# Patient Record
Sex: MALE | Race: WHITE | NOT HISPANIC OR LATINO | Employment: FULL TIME | URBAN - METROPOLITAN AREA
[De-identification: names, ages, dates, MRNs, and addresses within clinical notes are randomized per-mention and may not be internally consistent; named-entity substitution may affect disease eponyms.]

---

## 2017-06-01 ENCOUNTER — TELEPHONE (OUTPATIENT)
Dept: FAMILY MEDICINE | Facility: CLINIC | Age: 47
End: 2017-06-01

## 2017-06-01 NOTE — TELEPHONE ENCOUNTER
Form given to Dr Maldonado to review.  10/25/16 last physical, are you willing to complete with out office visit?    Pilo FARNSWORTH, CMA

## 2017-06-01 NOTE — TELEPHONE ENCOUNTER
Forms received from: patient   Phone number listed: [phone]   Fax listed: na  Date received: June 1, 2017  Form description: Pre-Participation Physical  Once forms are completed, please return to TC via folder.  Is patient requesting to be contacted when forms are completed: yes  Phone: same as above  Form placed: given to MA to complete then give to Provider for signature  Heavenly JONES

## 2017-07-21 DIAGNOSIS — I10 ESSENTIAL HYPERTENSION WITH GOAL BLOOD PRESSURE LESS THAN 140/90: ICD-10-CM

## 2017-07-21 DIAGNOSIS — Z00.00 ROUTINE GENERAL MEDICAL EXAMINATION AT A HEALTH CARE FACILITY: ICD-10-CM

## 2017-07-21 RX ORDER — LISINOPRIL/HYDROCHLOROTHIAZIDE 10-12.5 MG
TABLET ORAL
Qty: 90 TABLET | Refills: 0 | Status: SHIPPED | OUTPATIENT
Start: 2017-07-21 | End: 2017-10-19

## 2017-10-19 DIAGNOSIS — I10 ESSENTIAL HYPERTENSION WITH GOAL BLOOD PRESSURE LESS THAN 140/90: ICD-10-CM

## 2017-10-19 DIAGNOSIS — Z00.00 ROUTINE GENERAL MEDICAL EXAMINATION AT A HEALTH CARE FACILITY: ICD-10-CM

## 2017-10-19 NOTE — TELEPHONE ENCOUNTER
Lisinopril      Last Written Prescription Date: 7/21/17  Last Fill Quantity: 90, # refills: 0  Last Office Visit with [facility] or [facility] prescribing provider: 10/25/16       Potassium   Date Value Ref Range Status   10/25/2016 3.9 3.4 - 5.3 mmol/L Final     Creatinine   Date Value Ref Range Status   10/25/2016 1.02 0.66 - 1.25 mg/dL Final     BP Readings from Last 3 Encounters:   10/25/16 108/62   08/07/15 118/70   04/07/15 124/84     JULIA Parker LPN

## 2017-10-20 RX ORDER — LISINOPRIL/HYDROCHLOROTHIAZIDE 10-12.5 MG
1 TABLET ORAL DAILY
Qty: 30 TABLET | Refills: 0 | Status: SHIPPED | OUTPATIENT
Start: 2017-10-20 | End: 2017-12-28

## 2017-11-22 DIAGNOSIS — I10 ESSENTIAL HYPERTENSION WITH GOAL BLOOD PRESSURE LESS THAN 140/90: ICD-10-CM

## 2017-11-22 DIAGNOSIS — Z00.00 ROUTINE GENERAL MEDICAL EXAMINATION AT A HEALTH CARE FACILITY: ICD-10-CM

## 2017-11-22 RX ORDER — LISINOPRIL/HYDROCHLOROTHIAZIDE 10-12.5 MG
TABLET ORAL
Qty: 30 TABLET | Refills: 0 | OUTPATIENT
Start: 2017-11-22

## 2017-11-22 NOTE — TELEPHONE ENCOUNTER
Rx denied, patient was given 30 day supply and did not f/u. Routing to team to inform and assist in scheduling.   Karlee Davidson RN   [facility] - Triage

## 2017-11-22 NOTE — TELEPHONE ENCOUNTER
Requested Prescriptions   Pending Prescriptions Disp Refills     lisinopril-hydrochlorothiazide (PRINZIDE/ZESTORETIC) 10-12.5 MG per tablet [Pharmacy Med Name: LISINOPRIL-HCTZ 10/12.5MG TABLETS]  Last Written Prescription Date:  10/20/2017  Last Fill Quantity: 30 tablet,  # refills: 0   Last Office Visit with [facility] or [facility] prescribing provider:  10/25/2016   Future Office Visit:      30 tablet 0     Sig: TAKE 1 TABLET BY MOUTH DAILY    Diuretics (Including Combos) Protocol Failed    11/22/2017  3:12 AM       Failed - Blood pressure under 140/90    BP Readings from Last 3 Encounters:   10/25/16 108/62   08/07/15 118/70   04/07/15 124/84                Failed - Recent or future visit with authorizing provider's specialty    Patient had office visit in the last year or has a visit in the next 30 days with authorizing provider.  See chart review.              Failed - Normal serum creatinine on file in past 12 months    Recent Labs   Lab Test  10/25/16   0756   CR  1.02             Failed - Normal serum potassium on file in past 12 months    Recent Labs   Lab Test  10/25/16   0756   POTASSIUM  3.9                   Failed - Normal serum sodium on file in past 12 months    Recent Labs   Lab Test  10/25/16   0756   NA  139             Passed - Patient is age 18 or older

## 2017-12-28 ENCOUNTER — OFFICE VISIT (OUTPATIENT)
Dept: FAMILY MEDICINE | Facility: CLINIC | Age: 47
End: 2017-12-28
Payer: COMMERCIAL

## 2017-12-28 VITALS
HEIGHT: 69 IN | WEIGHT: 244 LBS | BODY MASS INDEX: 36.14 KG/M2 | DIASTOLIC BLOOD PRESSURE: 88 MMHG | SYSTOLIC BLOOD PRESSURE: 118 MMHG | HEART RATE: 80 BPM | TEMPERATURE: 95.7 F

## 2017-12-28 DIAGNOSIS — Z00.00 ROUTINE GENERAL MEDICAL EXAMINATION AT A HEALTH CARE FACILITY: Primary | ICD-10-CM

## 2017-12-28 DIAGNOSIS — I10 ESSENTIAL HYPERTENSION WITH GOAL BLOOD PRESSURE LESS THAN 140/90: ICD-10-CM

## 2017-12-28 DIAGNOSIS — F33.40 RECURRENT MAJOR DEPRESSIVE DISORDER, IN REMISSION (H): ICD-10-CM

## 2017-12-28 DIAGNOSIS — Z13.6 CARDIOVASCULAR SCREENING; LDL GOAL LESS THAN 130: ICD-10-CM

## 2017-12-28 LAB
ALBUMIN SERPL-MCNC: 4.1 G/DL (ref 3.4–5)
ALP SERPL-CCNC: 88 U/L (ref 40–150)
ALT SERPL W P-5'-P-CCNC: 50 U/L (ref 0–70)
ANION GAP SERPL CALCULATED.3IONS-SCNC: 5 MMOL/L (ref 3–14)
AST SERPL W P-5'-P-CCNC: 27 U/L (ref 0–45)
BILIRUB SERPL-MCNC: 0.4 MG/DL (ref 0.2–1.3)
BUN SERPL-MCNC: 13 MG/DL (ref 7–30)
CALCIUM SERPL-MCNC: 9.1 MG/DL (ref 8.5–10.1)
CHLORIDE SERPL-SCNC: 104 MMOL/L (ref 94–109)
CHOLEST SERPL-MCNC: 196 MG/DL
CO2 SERPL-SCNC: 31 MMOL/L (ref 20–32)
CREAT SERPL-MCNC: 0.89 MG/DL (ref 0.66–1.25)
GFR SERPL CREATININE-BSD FRML MDRD: >90 ML/MIN/1.7M2
GLUCOSE SERPL-MCNC: 102 MG/DL (ref 70–99)
HDLC SERPL-MCNC: 51 MG/DL
LDLC SERPL CALC-MCNC: 105 MG/DL
NONHDLC SERPL-MCNC: 145 MG/DL
POTASSIUM SERPL-SCNC: 4.2 MMOL/L (ref 3.4–5.3)
PROT SERPL-MCNC: 7.6 G/DL (ref 6.8–8.8)
PSA SERPL-ACNC: 1.06 UG/L (ref 0–4)
SODIUM SERPL-SCNC: 140 MMOL/L (ref 133–144)
TRIGL SERPL-MCNC: 202 MG/DL

## 2017-12-28 PROCEDURE — 36415 COLL VENOUS BLD VENIPUNCTURE: CPT | Performed by: FAMILY MEDICINE

## 2017-12-28 PROCEDURE — 99396 PREV VISIT EST AGE 40-64: CPT | Performed by: FAMILY MEDICINE

## 2017-12-28 PROCEDURE — 80061 LIPID PANEL: CPT | Performed by: FAMILY MEDICINE

## 2017-12-28 PROCEDURE — G0103 PSA SCREENING: HCPCS | Performed by: FAMILY MEDICINE

## 2017-12-28 PROCEDURE — 80053 COMPREHEN METABOLIC PANEL: CPT | Performed by: FAMILY MEDICINE

## 2017-12-28 RX ORDER — LISINOPRIL/HYDROCHLOROTHIAZIDE 10-12.5 MG
1 TABLET ORAL DAILY
Qty: 90 TABLET | Refills: 3 | Status: SHIPPED | OUTPATIENT
Start: 2017-12-28 | End: 2018-12-21

## 2017-12-28 ASSESSMENT — ANXIETY QUESTIONNAIRES
GAD7 TOTAL SCORE: 0
1. FEELING NERVOUS, ANXIOUS, OR ON EDGE: NOT AT ALL
7. FEELING AFRAID AS IF SOMETHING AWFUL MIGHT HAPPEN: NOT AT ALL
6. BECOMING EASILY ANNOYED OR IRRITABLE: NOT AT ALL
IF YOU CHECKED OFF ANY PROBLEMS ON THIS QUESTIONNAIRE, HOW DIFFICULT HAVE THESE PROBLEMS MADE IT FOR YOU TO DO YOUR WORK, TAKE CARE OF THINGS AT HOME, OR GET ALONG WITH OTHER PEOPLE: NOT DIFFICULT AT ALL
3. WORRYING TOO MUCH ABOUT DIFFERENT THINGS: NOT AT ALL
2. NOT BEING ABLE TO STOP OR CONTROL WORRYING: NOT AT ALL
5. BEING SO RESTLESS THAT IT IS HARD TO SIT STILL: NOT AT ALL

## 2017-12-28 ASSESSMENT — PATIENT HEALTH QUESTIONNAIRE - PHQ9
5. POOR APPETITE OR OVEREATING: NOT AT ALL
SUM OF ALL RESPONSES TO PHQ QUESTIONS 1-9: 1

## 2017-12-28 NOTE — MR AVS SNAPSHOT
After Visit Summary   12/28/2017    Joselito Padilla    MRN: [id number]           Patient Information     Date Of Birth          1970        Visit Information        Provider Department      12/28/2017 8:20 AM Bhaskar Maldonado MD [facility]        Today's Diagnoses     Routine general medical examination at a health care facility    -  1    Essential hypertension with goal blood pressure less than 140/90        Recurrent major depressive disorder, in remission (H)        CARDIOVASCULAR SCREENING; LDL GOAL LESS THAN 130          Care Instructions      Preventive Health Recommendations  Male Ages 40 to 49    Yearly exam:             See your health care provider every year in order to  o   Review health changes.   o   Discuss preventive care.    o   Review your medicines if your doctor has prescribed any.    You should be tested each year for STDs (sexually transmitted diseases) if you re at risk.     Have a cholesterol test every 5 years.     Have a colonoscopy (test for colon cancer) if someone in your family has had colon cancer or polyps before age 50.     After age 45, have a diabetes test (fasting glucose). If you are at risk for diabetes, you should have this test every 3 years.      Talk with your health care provider about whether or not a prostate cancer screening test (PSA) is right for you.    Shots: Get a flu shot each year. Get a tetanus shot every 10 years.     Nutrition:    Eat at least 5 servings of fruits and vegetables daily.     Eat whole-grain bread, whole-wheat pasta and brown rice instead of white grains and rice.     Talk to your provider about Calcium and Vitamin D.     Lifestyle    Exercise for at least 150 minutes a week (30 minutes a day, 5 days a week). This will help you control your weight and prevent disease.     Limit alcohol to one drink per day.     No smoking.     Wear sunscreen to prevent skin cancer.     See your dentist every six months for an  "exam and cleaning.              Follow-ups after your visit        Who to contact     If you have questions or need follow up information about today's clinic visit or your schedule please contact [facility] [place] directly at [phone].  Normal or non-critical lab and imaging results will be communicated to you by BFKWhart, letter or phone within 4 business days after the clinic has received the results. If you do not hear from us within 7 days, please contact the clinic through BFKWhart or phone. If you have a critical or abnormal lab result, we will notify you by phone as soon as possible.  Submit refill requests through [employer] or call your pharmacy and they will forward the refill request to us. Please allow 3 business days for your refill to be completed.          Additional Information About Your Visit        BFKWhart Information     [employer] gives you secure access to your electronic health record. If you see a primary care provider, you can also send messages to your care team and make appointments. If you have questions, please call your primary care clinic.  If you do not have a primary care provider, please call [phone] and they will assist you.        Care EveryWhere ID     This is your Care EveryWhere ID. This could be used by other organizations to access your [place] medical records  [id number]        Your Vitals Were     Pulse Temperature Height BMI (Body Mass Index)          80 95.7  F (35.4  C) 5' 9.13\" (1.756 m) 35.89 kg/m2         Blood Pressure from Last 3 Encounters:   12/28/17 118/88   10/25/16 108/62   08/07/15 118/70    Weight from Last 3 Encounters:   12/28/17 244 lb (110.7 kg)   10/25/16 242 lb (109.8 kg)   08/07/15 241 lb (109.3 kg)              We Performed the Following     Comprehensive metabolic panel     Lipid Profile (Chol, Trig, HDL, LDL calc)     PSA, screen          Today's Medication Changes          These changes are accurate as of: 12/28/17  8:34 AM.  If " you have any questions, ask your nurse or doctor.               These medicines have changed or have updated prescriptions.        Dose/Directions    lisinopril-hydrochlorothiazide 10-12.5 MG per tablet   Commonly known as:  PRINZIDE/ZESTORETIC   This may have changed:  additional instructions   Used for:  Essential hypertension with goal blood pressure less than 140/90, Routine general medical examination at a health care facility   Changed by:  Bhaskar Maldonado MD        Dose:  1 tablet   Take 1 tablet by mouth daily   Quantity:  90 tablet   Refills:  3            Where to get your medicines      These medications were sent to [facility] [address] [place], [place] [address] [place] [address] AT [facility] [address]  [address]  [address] [address], [facility] [address]     Phone:  [phone]     lisinopril-hydrochlorothiazide 10-12.5 MG per tablet                Primary Care Provider Office Phone # Fax #    Bhaskar Maldonado [phone]       [address]  [place] [address]        Equal Access to Services     [facility] AH: Hadii aad ku hadasho Soomaali, waaxda luqadaha, qaybta kaalmada adeegyada, waxay idiin haynardan monique peterson . So [facility] [phone].    ATENCIÓN: Si habla español, tiene a johnson disposición servicios gratuitos de asistencia lingüística. Llame al [phone].    We comply with applicable federal civil rights laws and [place] laws. We do not discriminate on the basis of race, color, national origin, age, disability, sex, sexual orientation, or gender identity.            Thank you!     Thank you for choosing [facility] [place]  for your care. Our goal is always to provide you with excellent care. Hearing back from our patients is one way we can continue to improve our services. Please take a few minutes to complete the written survey that you may receive in the mail after your visit with us. Thank you!             Your Updated Medication List - Protect others  around you: Learn how to safely use, store and throw away your medicines at www.disposemymeds.org.          This list is accurate as of: 12/28/17  8:34 AM.  Always use your most recent med list.                   Brand Name Dispense Instructions for use Diagnosis    IBUPROFEN PO      Take 400-600 mg by mouth every 6 hours as needed for moderate pain        lisinopril-hydrochlorothiazide 10-12.5 MG per tablet    PRINZIDE/ZESTORETIC    90 tablet    Take 1 tablet by mouth daily    Essential hypertension with goal blood pressure less than 140/90, Routine general medical examination at a health care facility       MULTIVITAMIN ADULT PO           PRISTIQ 100 MG 24 hr tablet   Generic drug:  desvenlafaxine succinate      TK ONE T PO QAM        STRATTERA 80 MG capsule   Generic drug:  atomoxetine      Take 80 mg by mouth daily

## 2017-12-28 NOTE — NURSING NOTE
"Chief Complaint   Patient presents with     Physical     Fasting        Initial /88  Pulse 80  Temp 95.7  F (35.4  C)  Ht 5' 9.13\" (1.756 m)  Wt 244 lb (110.7 kg)  BMI 35.89 kg/m2 Estimated body mass index is 35.89 kg/(m^2) as calculated from the following:    Height as of this encounter: 5' 9.13\" (1.756 m).    Weight as of this encounter: 244 lb (110.7 kg).  Medication Reconciliation: complete    Current Outpatient Prescriptions   Medication Sig Dispense Refill     lisinopril-hydrochlorothiazide (PRINZIDE/ZESTORETIC) 10-12.5 MG per tablet Take 1 tablet by mouth daily . DUE FOR APPOINTMENT 30 tablet 0     PRISTIQ 100 MG TB24 24 hr tablet TK ONE T PO QAM  1     Multiple Vitamins-Minerals (MULTIVITAMIN ADULT PO)        atomoxetine (STRATTERA) 80 MG capsule Take 80 mg by mouth daily       IBUPROFEN PO Take 400-600 mg by mouth every 6 hours as needed for moderate pain         Pilo FARNSWORTH CMA  "

## 2017-12-28 NOTE — PROGRESS NOTES
SUBJECTIVE:   CC: Joselito Padilla is an 47 year old male who presents for preventative health visit.     Healthy Habits:    Do you get at least three servings of calcium containing foods daily (dairy, green leafy vegetables, etc.)? yes    Amount of exercise or daily activities, outside of work: 4 day(s) per week    Problems taking medications regularly No    Medication side effects: No    Have you had an eye exam in the past two years? yes    Do you see a dentist twice per year? yes    Do you have sleep apnea, excessive snoring or daytime drowsiness?yes-wears cpap         Patient has history of depression.  It is well controlled.  Sees psychiatrist currently on Pristiq and Strattera. Blood pressure is well controlled..      Today's PHQ-2 Score:   PHQ-2 ( 1999 Pfizer) 12/28/2017 10/24/2016   Q1: Little interest or pleasure in doing things 0 -   Q2: Feeling down, depressed or hopeless 0 -   PHQ-2 Score 0 -   Q1: Little interest or pleasure in doing things - Not at all   Q2: Feeling down, depressed or hopeless - Not at all   PHQ-2 Score - 0       Abuse: Current or Past(Physical, Sexual or Emotional)- No  Do you feel safe in your environment - Yes    Social History   Substance Use Topics     Smoking status: Never Smoker     Smokeless tobacco: Never Used     Alcohol use No      If you drink alcohol do you typically have >3 drinks per day or >7 drinks per week? No                      Last PSA: No results found for: PSA    Reviewed orders with patient. Reviewed health maintenance and updated orders accordingly - Yes      Reviewed and updated as needed this visit by clinical staff  Tobacco  Allergies  Meds  Fam Hx  Soc Hx        Reviewed and updated as needed this visit by Provider            ROS:  C: NEGATIVE for fever, chills, change in weight  I: NEGATIVE for worrisome rashes, moles or lesions  E: NEGATIVE for vision changes or irritation  ENT: NEGATIVE for ear, mouth and throat problems  R: NEGATIVE for  "significant cough or SOB  CV: NEGATIVE for chest pain, palpitations or peripheral edema  GI: NEGATIVE for nausea, abdominal pain, heartburn, or change in bowel habits   male: negative for dysuria, hematuria, decreased urinary stream, erectile dysfunction, urethral discharge  M: NEGATIVE for significant arthralgias or myalgia  N: NEGATIVE for weakness, dizziness or paresthesias  P: NEGATIVE for changes in mood or affect    OBJECTIVE:   /88  Pulse 80  Temp 95.7  F (35.4  C)  Ht 5' 9.13\" (1.756 m)  Wt 244 lb (110.7 kg)  BMI 35.89 kg/m2  EXAM:  GENERAL: healthy, alert and no distress  EYES: Eyes grossly normal to inspection, PERRL and conjunctivae and sclerae normal  HENT: ear canals and TM's normal, nose and mouth without ulcers or lesions  NECK: no adenopathy, no asymmetry, masses, or scars and thyroid normal to palpation  RESP: lungs clear to auscultation - no rales, rhonchi or wheezes  CV: regular rate and rhythm, normal S1 S2, no S3 or S4, no murmur, click or rub, no peripheral edema and peripheral pulses strong  ABDOMEN: soft, nontender, no hepatosplenomegaly, no masses and bowel sounds normal  MS: no gross musculoskeletal defects noted, no edema  SKIN: no suspicious lesions or rashes  NEURO: Normal strength and tone, mentation intact and speech normal  PSYCH: mentation appears normal, affect normal/bright    ASSESSMENT/PLAN:   1. Routine general medical examination at a health care facility    - lisinopril-hydrochlorothiazide (PRINZIDE/ZESTORETIC) 10-12.5 MG per tablet; Take 1 tablet by mouth daily  Dispense: 90 tablet; Refill: 3  - Comprehensive metabolic panel  - Lipid Profile (Chol, Trig, HDL, LDL calc)  - PSA, screen    2. Essential hypertension with goal blood pressure less than 140/90    - lisinopril-hydrochlorothiazide (PRINZIDE/ZESTORETIC) 10-12.5 MG per tablet; Take 1 tablet by mouth daily  Dispense: 90 tablet; Refill: 3  - Comprehensive metabolic panel    3. Recurrent major depressive " "disorder, in remission (H)      4. CARDIOVASCULAR SCREENING; LDL GOAL LESS THAN 130    - Comprehensive metabolic panel  - Lipid Profile (Chol, Trig, HDL, LDL calc)    COUNSELING:  Reviewed preventive health counseling, as reflected in patient instructions       Regular exercise       Healthy diet/nutrition       reports that he has never smoked. He has never used smokeless tobacco.      Estimated body mass index is 35.89 kg/(m^2) as calculated from the following:    Height as of this encounter: 5' 9.13\" (1.756 m).    Weight as of this encounter: 244 lb (110.7 kg).         Counseling Resources:  ATP IV Guidelines  Pooled Cohorts Equation Calculator  FRAX Risk Assessment  ICSI Preventive Guidelines  Dietary Guidelines for Americans, 2010  USDA's MyPlate  ASA Prophylaxis  Lung CA Screening    Bhaskar Maldonado MD  [facility]  "

## 2017-12-29 ASSESSMENT — ANXIETY QUESTIONNAIRES: GAD7 TOTAL SCORE: 0

## 2018-05-14 ENCOUNTER — TRANSFERRED RECORDS (OUTPATIENT)
Dept: HEALTH INFORMATION MANAGEMENT | Facility: CLINIC | Age: 48
End: 2018-05-14

## 2018-06-27 ENCOUNTER — TELEPHONE (OUTPATIENT)
Dept: FAMILY MEDICINE | Facility: CLINIC | Age: 48
End: 2018-06-27

## 2018-06-27 NOTE — TELEPHONE ENCOUNTER
Original form placed at  with label  Copy sent to stat abstracting  Patient informed  Heavenly JONES

## 2018-06-27 NOTE — TELEPHONE ENCOUNTER
Pre-Participation Physical form dropped off by patient  Placed in PCP bin for review/signature  Heavenly JONES

## 2018-12-13 ENCOUNTER — TRANSFERRED RECORDS (OUTPATIENT)
Dept: HEALTH INFORMATION MANAGEMENT | Facility: CLINIC | Age: 48
End: 2018-12-13

## 2018-12-21 DIAGNOSIS — I10 ESSENTIAL HYPERTENSION WITH GOAL BLOOD PRESSURE LESS THAN 140/90: ICD-10-CM

## 2018-12-21 DIAGNOSIS — Z00.00 ROUTINE GENERAL MEDICAL EXAMINATION AT A HEALTH CARE FACILITY: ICD-10-CM

## 2018-12-21 RX ORDER — LISINOPRIL/HYDROCHLOROTHIAZIDE 10-12.5 MG
1 TABLET ORAL DAILY
Qty: 30 TABLET | Refills: 0 | Status: SHIPPED | OUTPATIENT
Start: 2018-12-21 | End: 2018-12-27

## 2018-12-21 NOTE — TELEPHONE ENCOUNTER
Prescription approved per [facility] Refill Protocol.  For 30 days since pt has appt on 12/27 with Dr. Maldonado.    Nichole DUMONT RN  EP Triage

## 2018-12-21 NOTE — TELEPHONE ENCOUNTER
"Requested Prescriptions   Pending Prescriptions Disp Refills     lisinopril-hydrochlorothiazide (PRINZIDE/ZESTORETIC) 10-12.5 MG tablet [Pharmacy Med Name: LISINOPRIL-HCTZ 10/12.5MG TABLETS]  Last Written Prescription Date:  12/28/17  Last Fill Quantity: 90,  # refills: 3   Last office visit: 12/28/2017 with prescribing provider:  Erica   Future Office Visit:   Next 5 appointments (look out 90 days)    Dec 27, 2018  8:20 AM CST  [facility] with Bhaskar Maldonado MD  [facility] ([address] [address] [phone]          90 tablet 0     Sig: TAKE 1 TABLET BY MOUTH DAILY    Diuretics (Including Combos) Protocol Passed - 12/21/2018  3:12 AM       Passed - Blood pressure under 140/90 in past 12 months    BP Readings from Last 3 Encounters:   12/28/17 118/88   10/25/16 108/62   08/07/15 118/70                Passed - Recent (12 mo) or future (30 days) visit within the authorizing provider's specialty    Patient had office visit in the last 12 months or has a visit in the next 30 days with authorizing provider or within the authorizing provider's specialty.  See \"Patient Info\" tab in inbasket, or \"Choose Columns\" in Meds & Orders section of the refill encounter.             Passed - Patient is age 18 or older       Passed - Normal serum creatinine on file in past 12 months    Recent Labs   Lab Test 12/28/17  0836   CR 0.89             Passed - Normal serum potassium on file in past 12 months    Recent Labs   Lab Test 12/28/17  0836   POTASSIUM 4.2                   Passed - Normal serum sodium on file in past 12 months    Recent Labs   Lab Test 12/28/17  0836                   "

## 2018-12-27 ENCOUNTER — OFFICE VISIT (OUTPATIENT)
Dept: FAMILY MEDICINE | Facility: CLINIC | Age: 48
End: 2018-12-27
Payer: COMMERCIAL

## 2018-12-27 VITALS
HEIGHT: 69 IN | TEMPERATURE: 97.5 F | DIASTOLIC BLOOD PRESSURE: 80 MMHG | WEIGHT: 234 LBS | SYSTOLIC BLOOD PRESSURE: 104 MMHG | HEART RATE: 88 BPM | BODY MASS INDEX: 34.66 KG/M2

## 2018-12-27 DIAGNOSIS — Z13.6 CARDIOVASCULAR SCREENING; LDL GOAL LESS THAN 130: ICD-10-CM

## 2018-12-27 DIAGNOSIS — G47.30 SLEEP APNEA, UNSPECIFIED TYPE: ICD-10-CM

## 2018-12-27 DIAGNOSIS — I10 ESSENTIAL HYPERTENSION WITH GOAL BLOOD PRESSURE LESS THAN 140/90: ICD-10-CM

## 2018-12-27 DIAGNOSIS — Z00.00 ROUTINE GENERAL MEDICAL EXAMINATION AT A HEALTH CARE FACILITY: ICD-10-CM

## 2018-12-27 DIAGNOSIS — F33.40 RECURRENT MAJOR DEPRESSIVE DISORDER, IN REMISSION (H): Primary | ICD-10-CM

## 2018-12-27 LAB
ALBUMIN SERPL-MCNC: 4.1 G/DL (ref 3.4–5)
ALP SERPL-CCNC: 72 U/L (ref 40–150)
ALT SERPL W P-5'-P-CCNC: 35 U/L (ref 0–70)
ANION GAP SERPL CALCULATED.3IONS-SCNC: 6 MMOL/L (ref 3–14)
AST SERPL W P-5'-P-CCNC: 27 U/L (ref 0–45)
BILIRUB SERPL-MCNC: 0.3 MG/DL (ref 0.2–1.3)
BUN SERPL-MCNC: 14 MG/DL (ref 7–30)
CALCIUM SERPL-MCNC: 9.2 MG/DL (ref 8.5–10.1)
CHLORIDE SERPL-SCNC: 104 MMOL/L (ref 94–109)
CHOLEST SERPL-MCNC: 183 MG/DL
CO2 SERPL-SCNC: 28 MMOL/L (ref 20–32)
CREAT SERPL-MCNC: 1.02 MG/DL (ref 0.66–1.25)
GFR SERPL CREATININE-BSD FRML MDRD: 86 ML/MIN/{1.73_M2}
GLUCOSE SERPL-MCNC: 106 MG/DL (ref 70–99)
HDLC SERPL-MCNC: 47 MG/DL
LDLC SERPL CALC-MCNC: 98 MG/DL
NONHDLC SERPL-MCNC: 136 MG/DL
POTASSIUM SERPL-SCNC: 3.8 MMOL/L (ref 3.4–5.3)
PROT SERPL-MCNC: 7.6 G/DL (ref 6.8–8.8)
SODIUM SERPL-SCNC: 138 MMOL/L (ref 133–144)
TRIGL SERPL-MCNC: 190 MG/DL

## 2018-12-27 PROCEDURE — 80053 COMPREHEN METABOLIC PANEL: CPT | Performed by: FAMILY MEDICINE

## 2018-12-27 PROCEDURE — 99396 PREV VISIT EST AGE 40-64: CPT | Performed by: FAMILY MEDICINE

## 2018-12-27 PROCEDURE — 80061 LIPID PANEL: CPT | Performed by: FAMILY MEDICINE

## 2018-12-27 PROCEDURE — 36415 COLL VENOUS BLD VENIPUNCTURE: CPT | Performed by: FAMILY MEDICINE

## 2018-12-27 RX ORDER — ARIPIPRAZOLE 2 MG/1
2 TABLET ORAL DAILY
Refills: 1 | COMMUNITY
Start: 2018-10-07

## 2018-12-27 RX ORDER — LISINOPRIL/HYDROCHLOROTHIAZIDE 10-12.5 MG
1 TABLET ORAL DAILY
Qty: 90 TABLET | Refills: 3 | Status: SHIPPED | OUTPATIENT
Start: 2018-12-27 | End: 2019-12-31

## 2018-12-27 ASSESSMENT — ANXIETY QUESTIONNAIRES
6. BECOMING EASILY ANNOYED OR IRRITABLE: NOT AT ALL
2. NOT BEING ABLE TO STOP OR CONTROL WORRYING: NOT AT ALL
3. WORRYING TOO MUCH ABOUT DIFFERENT THINGS: NOT AT ALL
7. FEELING AFRAID AS IF SOMETHING AWFUL MIGHT HAPPEN: NOT AT ALL
5. BEING SO RESTLESS THAT IT IS HARD TO SIT STILL: NOT AT ALL
GAD7 TOTAL SCORE: 0
1. FEELING NERVOUS, ANXIOUS, OR ON EDGE: NOT AT ALL

## 2018-12-27 ASSESSMENT — MIFFLIN-ST. JEOR: SCORE: 1921.41

## 2018-12-27 ASSESSMENT — PATIENT HEALTH QUESTIONNAIRE - PHQ9
SUM OF ALL RESPONSES TO PHQ QUESTIONS 1-9: 3
5. POOR APPETITE OR OVEREATING: NOT AT ALL

## 2018-12-27 NOTE — PROGRESS NOTES
SUBJECTIVE:   CC: Joselito Padilla is an 48 year old male who presents for preventative health visit.     Physical   Annual:     Getting at least 3 servings of Calcium per day:  Yes    Bi-annual eye exam:  Yes    Dental care twice a year:  Yes    Sleep apnea or symptoms of sleep apnea:  Sleep apnea    Diet:  Regular (no restrictions)    Frequency of exercise:  4-5 days/week    Duration of exercise:  30-45 minutes    Taking medications regularly:  Yes    Medication side effects:  None    Additional concerns today:  No    PHQ-2 Total Score: 0        Depression symptoms in partial remission.  Sees [facility] currently on stable medications.    Sleep apnea issues on CPAP.  Blood pressure stable currently on stable dose of lisinopril/hydrochlorothiazide.  Denies any side effects.  Today's PHQ-2 Score:   PHQ-2 ( 1999 Pfizer) 12/26/2018   Q1: Little interest or pleasure in doing things 0   Q2: Feeling down, depressed or hopeless 0   PHQ-2 Score 0   Q1: Little interest or pleasure in doing things Not at all   Q2: Feeling down, depressed or hopeless Not at all   PHQ-2 Score 0       Abuse: Current or Past(Physical, Sexual or Emotional)- No  Do you feel safe in your environment? Yes    Social History     Tobacco Use     Smoking status: Never Smoker     Smokeless tobacco: Never Used   Substance Use Topics     Alcohol use: No     Alcohol/week: 0.0 oz     Alcohol Use 12/26/2018   If you drink alcohol do you typically have greater than 3 drinks per day OR greater than 7 drinks per week? Not Applicable       Last PSA:   PSA   Date Value Ref Range Status   12/28/2017 1.06 0 - 4 ug/L Final     Comment:     Assay Method:  Chemiluminescence using Siemens Vista analyzer       Reviewed orders with patient. Reviewed health maintenance and updated orders accordingly - Yes  BP Readings from Last 3 Encounters:   12/27/18 104/80   12/28/17 118/88   10/25/16 108/62    Wt Readings from Last 3 Encounters:   12/27/18 106.1 kg (234 lb)  "  12/28/17 110.7 kg (244 lb)   10/25/16 109.8 kg (242 lb)                  Patient Active Problem List   Diagnosis     Hypertension goal BP (blood pressure) < 140/90     Depression, major, recurrent (H)     CARDIOVASCULAR SCREENING; LDL GOAL LESS THAN 130     Sleep apnea     Francisco's deformity, right     Other postprocedural status(V45.89)     Past Surgical History:   Procedure Laterality Date     DENTAL SURGERY  1989    [place] teeth     VASECTOMY  2007       Social History     Tobacco Use     Smoking status: Never Smoker     Smokeless tobacco: Never Used   Substance Use Topics     Alcohol use: No     Alcohol/week: 0.0 oz     Family History   Problem Relation Age of Onset     Pulmonary Hypertension Mother      Hypertension Father      Heart Disease Father         bypass surgery     C.A.D. Father      Prostate Cancer Father            Reviewed and updated as needed this visit by clinical staff  Tobacco  Allergies  Meds  Fam Hx  Soc Hx        Reviewed and updated as needed this visit by Provider            Review of Systems  CONSTITUTIONAL: NEGATIVE for fever, chills, change in weight  INTEGUMENTARY/SKIN: NEGATIVE for worrisome rashes, moles or lesions  EYES: NEGATIVE for vision changes or irritation  ENT: NEGATIVE for ear, mouth and throat problems  RESP: NEGATIVE for significant cough or SOB  CV: NEGATIVE for chest pain, palpitations or peripheral edema  GI: NEGATIVE for nausea, abdominal pain, heartburn, or change in bowel habits   male: negative for dysuria, hematuria, decreased urinary stream, erectile dysfunction, urethral discharge  MUSCULOSKELETAL: NEGATIVE for significant arthralgias or myalgia  NEURO: NEGATIVE for weakness, dizziness or paresthesias  PSYCHIATRIC: NEGATIVE for changes in mood or affect    OBJECTIVE:   /80   Pulse 88   Temp 97.5  F (36.4  C) (Tympanic)   Ht 1.76 m (5' 9.29\")   Wt 106.1 kg (234 lb)   BMI 34.27 kg/m      Physical Exam  GENERAL: healthy, alert and no " "distress  EYES: Eyes grossly normal to inspection, PERRL and conjunctivae and sclerae normal  HENT: ear canals and TM's normal, nose and mouth without ulcers or lesions  NECK: no adenopathy, no asymmetry, masses, or scars and thyroid normal to palpation  RESP: lungs clear to auscultation - no rales, rhonchi or wheezes  CV: regular rate and rhythm, normal S1 S2, no S3 or S4, no murmur, click or rub, no peripheral edema and peripheral pulses strong  ABDOMEN: soft, nontender, no hepatosplenomegaly, no masses and bowel sounds normal  MS: no gross musculoskeletal defects noted, no edema  SKIN: no suspicious lesions or rashes  NEURO: Normal strength and tone, mentation intact and speech normal  PSYCH: mentation appears normal, affect normal/bright    Diagnostic Test Results:  none     ASSESSMENT/PLAN:   1. Routine general medical examination at a health care facility  Labs ordered.  Will follow up on those  - lisinopril-hydrochlorothiazide (PRINZIDE/ZESTORETIC) 10-12.5 MG tablet; Take 1 tablet by mouth daily  Dispense: 90 tablet; Refill: 3    2. Essential hypertension with goal blood pressure less than 140/90    - lisinopril-hydrochlorothiazide (PRINZIDE/ZESTORETIC) 10-12.5 MG tablet; Take 1 tablet by mouth daily  Dispense: 90 tablet; Refill: 3  - Comprehensive metabolic panel    3. Recurrent major depressive disorder, in remission (H)  Stable without any side effects.    4. Sleep apnea, unspecified type      5. CARDIOVASCULAR SCREENING; LDL GOAL LESS THAN 130    - Comprehensive metabolic panel  - Lipid panel reflex to direct LDL Fasting    COUNSELING:   Reviewed preventive health counseling, as reflected in patient instructions       Regular exercise       Healthy diet/nutrition    BP Readings from Last 1 Encounters:   12/27/18 104/80     Estimated body mass index is 34.27 kg/m  as calculated from the following:    Height as of this encounter: 1.76 m (5' 9.29\").    Weight as of this encounter: 106.1 kg (234 " lb).           reports that  has never smoked. he has never used smokeless tobacco.      Counseling Resources:  ATP IV Guidelines  Pooled Cohorts Equation Calculator  FRAX Risk Assessment  ICSI Preventive Guidelines  Dietary Guidelines for Americans, 2010  USDA's MyPlate  ASA Prophylaxis  Lung CA Screening    Bhaskar Maldonado MD  [facility]

## 2018-12-29 ASSESSMENT — ANXIETY QUESTIONNAIRES: GAD7 TOTAL SCORE: 0

## 2019-07-11 ENCOUNTER — TELEPHONE (OUTPATIENT)
Dept: FAMILY MEDICINE | Facility: CLINIC | Age: 49
End: 2019-07-11

## 2019-07-11 NOTE — TELEPHONE ENCOUNTER
Form placed in pcp in-basket.      .Stefanie SUN    [facility] [place]

## 2019-07-12 NOTE — TELEPHONE ENCOUNTER
Completed, faxed to medical records and called patient to advised form would be at the  for .      .Stefanie SUN    [facility] [place]

## 2019-07-15 NOTE — TELEPHONE ENCOUNTER
Form picked up by patient on 07.15.2019.    .Stefanie SUN    [facility] [place]

## 2019-12-31 ENCOUNTER — OFFICE VISIT (OUTPATIENT)
Dept: FAMILY MEDICINE | Facility: CLINIC | Age: 49
End: 2019-12-31
Payer: COMMERCIAL

## 2019-12-31 VITALS
SYSTOLIC BLOOD PRESSURE: 122 MMHG | WEIGHT: 237 LBS | DIASTOLIC BLOOD PRESSURE: 82 MMHG | OXYGEN SATURATION: 98 % | TEMPERATURE: 96.9 F | BODY MASS INDEX: 35.1 KG/M2 | HEART RATE: 88 BPM | HEIGHT: 69 IN

## 2019-12-31 DIAGNOSIS — Z13.6 CARDIOVASCULAR SCREENING; LDL GOAL LESS THAN 130: ICD-10-CM

## 2019-12-31 DIAGNOSIS — F33.40 RECURRENT MAJOR DEPRESSIVE DISORDER, IN REMISSION (H): Primary | ICD-10-CM

## 2019-12-31 DIAGNOSIS — I10 HYPERTENSION GOAL BP (BLOOD PRESSURE) < 140/90: ICD-10-CM

## 2019-12-31 DIAGNOSIS — Z00.00 ANNUAL PHYSICAL EXAM: ICD-10-CM

## 2019-12-31 DIAGNOSIS — Z12.11 COLON CANCER SCREENING: ICD-10-CM

## 2019-12-31 PROCEDURE — 80061 LIPID PANEL: CPT | Performed by: FAMILY MEDICINE

## 2019-12-31 PROCEDURE — G0103 PSA SCREENING: HCPCS | Performed by: FAMILY MEDICINE

## 2019-12-31 PROCEDURE — 36415 COLL VENOUS BLD VENIPUNCTURE: CPT | Performed by: FAMILY MEDICINE

## 2019-12-31 PROCEDURE — 99396 PREV VISIT EST AGE 40-64: CPT | Performed by: FAMILY MEDICINE

## 2019-12-31 PROCEDURE — 80053 COMPREHEN METABOLIC PANEL: CPT | Performed by: FAMILY MEDICINE

## 2019-12-31 RX ORDER — LISINOPRIL/HYDROCHLOROTHIAZIDE 10-12.5 MG
1 TABLET ORAL DAILY
Qty: 90 TABLET | Refills: 3 | Status: SHIPPED | OUTPATIENT
Start: 2019-12-31 | End: 2020-12-17

## 2019-12-31 ASSESSMENT — PATIENT HEALTH QUESTIONNAIRE - PHQ9
5. POOR APPETITE OR OVEREATING: SEVERAL DAYS
SUM OF ALL RESPONSES TO PHQ QUESTIONS 1-9: 1

## 2019-12-31 ASSESSMENT — ANXIETY QUESTIONNAIRES
GAD7 TOTAL SCORE: 1
5. BEING SO RESTLESS THAT IT IS HARD TO SIT STILL: NOT AT ALL
1. FEELING NERVOUS, ANXIOUS, OR ON EDGE: NOT AT ALL
3. WORRYING TOO MUCH ABOUT DIFFERENT THINGS: NOT AT ALL
2. NOT BEING ABLE TO STOP OR CONTROL WORRYING: NOT AT ALL
6. BECOMING EASILY ANNOYED OR IRRITABLE: NOT AT ALL
7. FEELING AFRAID AS IF SOMETHING AWFUL MIGHT HAPPEN: NOT AT ALL
IF YOU CHECKED OFF ANY PROBLEMS ON THIS QUESTIONNAIRE, HOW DIFFICULT HAVE THESE PROBLEMS MADE IT FOR YOU TO DO YOUR WORK, TAKE CARE OF THINGS AT HOME, OR GET ALONG WITH OTHER PEOPLE: NOT DIFFICULT AT ALL

## 2019-12-31 ASSESSMENT — MIFFLIN-ST. JEOR: SCORE: 1925.4

## 2019-12-31 NOTE — PROGRESS NOTES
SUBJECTIVE:   CC: Joselito Padilla is an 49 year old male who presents for preventative health visit.     Healthy Habits:     Getting at least 3 servings of Calcium per day:  Yes    Bi-annual eye exam:  Yes    Dental care twice a year:  Yes    Sleep apnea or symptoms of sleep apnea:  Sleep apnea    Diet:  Regular (no restrictions)    Frequency of exercise:  4-5 days/week    Duration of exercise:  30-45 minutes    Taking medications regularly:  Yes    Medication side effects:  None    PHQ-2 Total Score: 0    Additional concerns today:  No    Depression is well controled.      Today's PHQ-2 Score:   PHQ-2 ( 1999 Pfizer) 12/31/2019   Q1: Little interest or pleasure in doing things 0   Q2: Feeling down, depressed or hopeless 0   PHQ-2 Score 0   Q1: Little interest or pleasure in doing things Not at all   Q2: Feeling down, depressed or hopeless Not at all   PHQ-2 Score 0       Abuse: Current or Past(Physical, Sexual or Emotional)- No  Do you feel safe in your environment? Yes        Social History     Tobacco Use     Smoking status: Never Smoker     Smokeless tobacco: Never Used   Substance Use Topics     Alcohol use: No     Alcohol/week: 0.0 standard drinks       Alcohol Use 12/31/2019   Prescreen: >3 drinks/day or >7 drinks/week? Not Applicable   Prescreen: >3 drinks/day or >7 drinks/week? -     Last PSA:   PSA   Date Value Ref Range Status   12/28/2017 1.06 0 - 4 ug/L Final     Comment:     Assay Method:  Chemiluminescence using Siemens Vista analyzer       Reviewed orders with patient. Reviewed health maintenance and updated orders accordingly - Yes  Lab work is in process    Reviewed and updated as needed this visit by clinical staff  Tobacco  Allergies  Meds         Reviewed and updated as needed this visit by Provider            Review of Systems  CONSTITUTIONAL: NEGATIVE for fever, chills, change in weight  INTEGUMENTARY/SKIN: NEGATIVE for worrisome rashes, moles or lesions  EYES: NEGATIVE for vision changes  "or irritation  ENT: NEGATIVE for ear, mouth and throat problems  RESP: NEGATIVE for significant cough or SOB  CV: NEGATIVE for chest pain, palpitations or peripheral edema  GI: NEGATIVE for nausea, abdominal pain, heartburn, or change in bowel habits   male: negative for dysuria, hematuria, decreased urinary stream, erectile dysfunction, urethral discharge  MUSCULOSKELETAL: NEGATIVE for significant arthralgias or myalgia  NEURO: NEGATIVE for weakness, dizziness or paresthesias  PSYCHIATRIC: NEGATIVE for changes in mood or affect    OBJECTIVE:   /82   Pulse 88   Temp 96.9  F (36.1  C) (Tympanic)   Ht 5' 9\" (1.753 m)   Wt 237 lb (107.5 kg)   SpO2 98%   BMI 35.00 kg/m      Physical Exam  GENERAL: healthy, alert and no distress  EYES: Eyes grossly normal to inspection, PERRL and conjunctivae and sclerae normal  HENT: ear canals and TM's normal, nose and mouth without ulcers or lesions  NECK: no adenopathy, no asymmetry, masses, or scars and thyroid normal to palpation  RESP: lungs clear to auscultation - no rales, rhonchi or wheezes  CV: regular rate and rhythm, normal S1 S2, no S3 or S4, no murmur, click or rub, no peripheral edema and peripheral pulses strong  ABDOMEN: soft, nontender, no hepatosplenomegaly, no masses and bowel sounds normal  MS: no gross musculoskeletal defects noted, no edema  SKIN: no suspicious lesions or rashes  NEURO: Normal strength and tone, mentation intact and speech normal  PSYCH: mentation appears normal, affect normal/bright    Diagnostic Test Results:  Labs reviewed in Epic    ASSESSMENT/PLAN:   1. Annual physical exam    - Comprehensive metabolic panel  - Lipid panel reflex to direct LDL Fasting  - Prostate spec antigen screen    2. Recurrent major depressive disorder, in remission (H)  Well controled., stable on medications    3. CARDIOVASCULAR SCREENING; LDL GOAL LESS THAN 130    - Comprehensive metabolic panel  - Lipid panel reflex to direct LDL Fasting    4. " "Hypertension goal BP (blood pressure) < 140/90    - Lipid panel reflex to direct LDL Fasting  - lisinopril-hydrochlorothiazide (PRINZIDE/ZESTORETIC) 10-12.5 MG tablet; Take 1 tablet by mouth daily  Dispense: 90 tablet; Refill: 3    5. Colon cancer screening    - GASTROENTEROLOGY ADULT REF PROCEDURE ONLY    COUNSELING:   Reviewed preventive health counseling, as reflected in patient instructions       Regular exercise       Healthy diet/nutrition    Estimated body mass index is 35 kg/m  as calculated from the following:    Height as of this encounter: 5' 9\" (1.753 m).    Weight as of this encounter: 237 lb (107.5 kg).          reports that he has never smoked. He has never used smokeless tobacco.      Counseling Resources:  ATP IV Guidelines  Pooled Cohorts Equation Calculator  FRAX Risk Assessment  ICSI Preventive Guidelines  Dietary Guidelines for Americans, 2010  USDA's MyPlate  ASA Prophylaxis  Lung CA Screening    Bhaskar Maldonado MD  [facility]  "

## 2020-01-01 ASSESSMENT — ANXIETY QUESTIONNAIRES: GAD7 TOTAL SCORE: 1

## 2020-01-02 ENCOUNTER — HOSPITAL ENCOUNTER (OUTPATIENT)
Facility: CLINIC | Age: 50
End: 2020-01-02
Attending: COLON & RECTAL SURGERY | Admitting: COLON & RECTAL SURGERY
Payer: COMMERCIAL

## 2020-01-02 LAB
ALBUMIN SERPL-MCNC: 4.1 G/DL (ref 3.4–5)
ALP SERPL-CCNC: 73 U/L (ref 40–150)
ALT SERPL W P-5'-P-CCNC: 45 U/L (ref 0–70)
ANION GAP SERPL CALCULATED.3IONS-SCNC: 4 MMOL/L (ref 3–14)
AST SERPL W P-5'-P-CCNC: 26 U/L (ref 0–45)
BILIRUB SERPL-MCNC: 0.4 MG/DL (ref 0.2–1.3)
BUN SERPL-MCNC: 16 MG/DL (ref 7–30)
CALCIUM SERPL-MCNC: 9 MG/DL (ref 8.5–10.1)
CHLORIDE SERPL-SCNC: 104 MMOL/L (ref 94–109)
CHOLEST SERPL-MCNC: 203 MG/DL
CO2 SERPL-SCNC: 31 MMOL/L (ref 20–32)
CREAT SERPL-MCNC: 0.92 MG/DL (ref 0.66–1.25)
GFR SERPL CREATININE-BSD FRML MDRD: >90 ML/MIN/{1.73_M2}
GLUCOSE SERPL-MCNC: 91 MG/DL (ref 70–99)
HDLC SERPL-MCNC: 52 MG/DL
LDLC SERPL CALC-MCNC: 123 MG/DL
NONHDLC SERPL-MCNC: 151 MG/DL
POTASSIUM SERPL-SCNC: 4 MMOL/L (ref 3.4–5.3)
PROT SERPL-MCNC: 7.4 G/DL (ref 6.8–8.8)
PSA SERPL-ACNC: 1.27 UG/L (ref 0–4)
SODIUM SERPL-SCNC: 139 MMOL/L (ref 133–144)
TRIGL SERPL-MCNC: 140 MG/DL

## 2020-02-24 ENCOUNTER — TRANSFERRED RECORDS (OUTPATIENT)
Dept: HEALTH INFORMATION MANAGEMENT | Facility: CLINIC | Age: 50
End: 2020-02-24

## 2020-06-16 DIAGNOSIS — Z11.59 ENCOUNTER FOR SCREENING FOR OTHER VIRAL DISEASES: Primary | ICD-10-CM

## 2020-07-10 DIAGNOSIS — Z11.59 ENCOUNTER FOR SCREENING FOR OTHER VIRAL DISEASES: ICD-10-CM

## 2020-07-10 PROCEDURE — 99207 ZZC NO BILLABLE SERVICE THIS VISIT: CPT

## 2020-07-10 PROCEDURE — U0003 INFECTIOUS AGENT DETECTION BY NUCLEIC ACID (DNA OR RNA); SEVERE ACUTE RESPIRATORY SYNDROME CORONAVIRUS 2 (SARS-COV-2) (CORONAVIRUS DISEASE [COVID-19]), AMPLIFIED PROBE TECHNIQUE, MAKING USE OF HIGH THROUGHPUT TECHNOLOGIES AS DESCRIBED BY CMS-2020-01-R: HCPCS | Performed by: COLON & RECTAL SURGERY

## 2020-07-11 LAB
SARS-COV-2 RNA SPEC QL NAA+PROBE: NOT DETECTED
SPECIMEN SOURCE: NORMAL

## 2020-07-13 ENCOUNTER — HOSPITAL ENCOUNTER (OUTPATIENT)
Facility: CLINIC | Age: 50
Discharge: HOME | End: 2020-07-13
Attending: COLON & RECTAL SURGERY | Admitting: COLON & RECTAL SURGERY
Payer: COMMERCIAL

## 2020-07-13 VITALS
BODY MASS INDEX: 30.78 KG/M2 | RESPIRATION RATE: 11 BRPM | DIASTOLIC BLOOD PRESSURE: 88 MMHG | OXYGEN SATURATION: 98 % | HEIGHT: 70 IN | HEART RATE: 63 BPM | SYSTOLIC BLOOD PRESSURE: 123 MMHG | WEIGHT: 215 LBS

## 2020-07-13 LAB — COLONOSCOPY: NORMAL

## 2020-07-13 PROCEDURE — 88305 TISSUE EXAM BY PATHOLOGIST: CPT | Mod: 26,59 | Performed by: PATHOLOGY

## 2020-07-13 PROCEDURE — G0500 MOD SEDAT ENDO SERVICE >5YRS: HCPCS | Performed by: COLON & RECTAL SURGERY

## 2020-07-13 PROCEDURE — 88305 TISSUE EXAM BY PATHOLOGIST: CPT | Performed by: COLON & RECTAL SURGERY

## 2020-07-13 PROCEDURE — 88305 TISSUE EXAM BY PATHOLOGIST: CPT | Mod: 26,59 | Performed by: COLON & RECTAL SURGERY

## 2020-07-13 PROCEDURE — 25000128 H RX IP 250 OP 636: Performed by: COLON & RECTAL SURGERY

## 2020-07-13 PROCEDURE — 99153 MOD SED SAME PHYS/QHP EA: CPT

## 2020-07-13 PROCEDURE — 45385 COLONOSCOPY W/LESION REMOVAL: CPT | Performed by: COLON & RECTAL SURGERY

## 2020-07-13 RX ORDER — LIDOCAINE 40 MG/G
CREAM TOPICAL
Status: DISCONTINUED | OUTPATIENT
Start: 2020-07-13 | End: 2020-07-13 | Stop reason: HOSPADM

## 2020-07-13 RX ORDER — FENTANYL CITRATE 50 UG/ML
INJECTION, SOLUTION INTRAMUSCULAR; INTRAVENOUS PRN
Status: DISCONTINUED | OUTPATIENT
Start: 2020-07-13 | End: 2020-07-13 | Stop reason: HOSPADM

## 2020-07-13 RX ORDER — ONDANSETRON 2 MG/ML
4 INJECTION INTRAMUSCULAR; INTRAVENOUS
Status: DISCONTINUED | OUTPATIENT
Start: 2020-07-13 | End: 2020-07-13 | Stop reason: HOSPADM

## 2020-07-13 ASSESSMENT — MIFFLIN-ST. JEOR: SCORE: 1833.54

## 2020-07-13 NOTE — H&P
Colon & Rectal Surgery History and Physical  Pre-Endoscopy Procedure Note    History of Present Illness   I have been asked by Dr. Maldonado to evaluate this 49 year old male for colorectal cancer screening. He currently denies any abdominal pain, weight loss, bleeding per rectum, or recent change in bowel habits.    Past Medical History  Diagnosis Date     Achilles tendon pain 11/25/2013     ADHD      CARDIOVASCULAR SCREENING; LDL GOAL LESS THAN 130 11/12/2014     Depressive disorder      Hypertension goal BP (blood pressure) < 140/90 11/25/2013     Sleep apnea        Past Surgical History  Procedure Laterality Date     DENTAL SURGERY  1989    [place] teeth     Heel surgery      bilateral     VASECTOMY  2007        Medications  Medication Sig     ARIPiprazole (ABILIFY) 2 MG tablet TK 1 T PO  QAM     atomoxetine (STRATTERA) 80 MG capsule Take 80 mg by mouth daily     IBUPROFEN PO Take 400-600 mg by mouth every 6 hours as needed for moderate pain     lisinopril-hydrochlorothiazide (PRINZIDE/ZESTORETIC) 10-12.5 MG tablet Take 1 tablet by mouth daily     Multiple Vitamins-Minerals (MULTIVITAMIN ADULT PO)      PRISTIQ 100 MG TB24 24 hr tablet TK ONE T PO QAM       Allergies   No Known Allergies     Family History   Family history includes C.A.D. in his father; Heart Disease in his father; Hypertension in his father; Prostate Cancer in his father; Pulmonary Hypertension in his mother.     Social History    He reports that he has never smoked. He has never used smokeless tobacco. He reports that he does not drink alcohol or use drugs.    Review of Systems   Constitutional:  No fever, weight change or fatigue.    Eyes:     No dry eyes or vision changes.   Ears/Nose/Throat/Neck:  No oral ulcers, sore throat or voice change.    Cardiovascular:   No palpitations, syncope, angina or edema.   Respiratory:    No chest pain, excessive sleepiness, shortness of breath or hemoptysis.    Gastrointestinal:   No abdominal pain, nausea,  "vomiting, diarrhea or heartburn.    Genitourinary:   No dysuria, hematuria, urinary retention or urinary frequency.   Musculoskeletal:  No joint swelling or arthralgias.    Dermatologic:  No skin rash or other skin changes.   Neurologic:    No focal weakness or numbness. No neuropathy.   Psychiatric:    No depression, anxiety, suicidal ideation, or paranoid ideation.   Endocrine:   No cold or heat intolerance, polydipsia, hirsutism, change in libido, or flushing.   Hematology/Lymphatic:  No bleeding or lymphadenopathy.    Allergy/Immunology:  No rhinitis or hives.     Physical Exam   Vitals:  Blood pressure (!) 127/92, resp. rate 16, height 1.765 m (5' 9.5\"), weight 97.5 kg (215 lb), SpO2 100 %.    General:  Alert and oriented to person, place and time   Airway: Normal oropharyngeal airway and neck mobility   Lungs:  Clear bilaterally   Heart:  Regular rate and rhythm   Abdomen: Soft, NT, ND, no masses   Rectal:  Perianal skin without excoriation, hemorrhoidal disease or anal fissure        Digital rectal examination reveals normal sphincter tone without masses    ASA Grade: II (mild systemic disease)    Impression: Cleared for use of conscious sedation for colorectal cancer screening    Plan: Proceed with colonoscopy     Татьяна Mercer MD, MD  [facility]  [phone]  "

## 2020-07-14 LAB — COPATH REPORT: NORMAL

## 2020-08-28 ENCOUNTER — MYC REFILL (OUTPATIENT)
Dept: FAMILY MEDICINE | Facility: CLINIC | Age: 50
End: 2020-08-28

## 2020-08-28 DIAGNOSIS — I10 HYPERTENSION GOAL BP (BLOOD PRESSURE) < 140/90: ICD-10-CM

## 2020-08-28 RX ORDER — LISINOPRIL/HYDROCHLOROTHIAZIDE 10-12.5 MG
1 TABLET ORAL DAILY
Qty: 90 TABLET | Refills: 3 | Status: CANCELLED | OUTPATIENT
Start: 2020-08-28

## 2020-08-28 RX ORDER — LISINOPRIL 10 MG/1
10 TABLET ORAL DAILY
Qty: 90 TABLET | Refills: 0 | Status: SHIPPED | OUTPATIENT
Start: 2020-08-28 | End: 2020-11-23

## 2020-08-28 RX ORDER — HYDROCHLOROTHIAZIDE 12.5 MG/1
12.5 TABLET ORAL DAILY
Qty: 90 TABLET | Refills: 0 | Status: SHIPPED | OUTPATIENT
Start: 2020-08-28 | End: 2020-11-23

## 2020-08-28 NOTE — TELEPHONE ENCOUNTER
rxs for lisinopril 10 mg 1 tab daily and hydrochlorothiazide 12.5 mg 1 tab daily sent separately to pharmacy.    Nichole DUMONT RN  EP Triage

## 2020-11-23 DIAGNOSIS — I10 HYPERTENSION GOAL BP (BLOOD PRESSURE) < 140/90: ICD-10-CM

## 2020-11-23 RX ORDER — HYDROCHLOROTHIAZIDE 12.5 MG/1
TABLET ORAL
Qty: 90 TABLET | Refills: 0 | Status: SHIPPED | OUTPATIENT
Start: 2020-11-23 | End: 2020-12-17

## 2020-11-23 RX ORDER — LISINOPRIL 10 MG/1
TABLET ORAL
Qty: 90 TABLET | Refills: 0 | Status: SHIPPED | OUTPATIENT
Start: 2020-11-23 | End: 2020-12-17

## 2020-11-23 NOTE — TELEPHONE ENCOUNTER
Medication is being filled for 1 time refill only due to:  Patient needs to be seen because it will be a year since seen in December.     Nichole DUMONT RN  EP Triage

## 2020-11-24 NOTE — TELEPHONE ENCOUNTER
Left message on voicemail for patient to call back. Pilo FARNSWORTH CMA  Pt should schedule physical on or after 12/31.

## 2020-12-13 ENCOUNTER — HEALTH MAINTENANCE LETTER (OUTPATIENT)
Age: 50
End: 2020-12-13

## 2020-12-17 ENCOUNTER — OFFICE VISIT (OUTPATIENT)
Dept: FAMILY MEDICINE | Facility: CLINIC | Age: 50
End: 2020-12-17
Payer: COMMERCIAL

## 2020-12-17 VITALS
WEIGHT: 221 LBS | HEART RATE: 72 BPM | OXYGEN SATURATION: 98 % | DIASTOLIC BLOOD PRESSURE: 82 MMHG | HEIGHT: 70 IN | SYSTOLIC BLOOD PRESSURE: 124 MMHG | TEMPERATURE: 97.6 F | BODY MASS INDEX: 31.64 KG/M2

## 2020-12-17 DIAGNOSIS — F33.40 RECURRENT MAJOR DEPRESSIVE DISORDER, IN REMISSION (H): Primary | ICD-10-CM

## 2020-12-17 DIAGNOSIS — Z00.00 ENCOUNTER FOR ANNUAL PHYSICAL EXAM: ICD-10-CM

## 2020-12-17 DIAGNOSIS — Z23 NEED FOR VACCINATION: ICD-10-CM

## 2020-12-17 DIAGNOSIS — Z12.5 SCREENING FOR PROSTATE CANCER: ICD-10-CM

## 2020-12-17 DIAGNOSIS — I10 HYPERTENSION GOAL BP (BLOOD PRESSURE) < 140/90: ICD-10-CM

## 2020-12-17 DIAGNOSIS — Z11.4 SCREENING FOR HIV (HUMAN IMMUNODEFICIENCY VIRUS): ICD-10-CM

## 2020-12-17 DIAGNOSIS — Z11.59 NEED FOR HEPATITIS C SCREENING TEST: ICD-10-CM

## 2020-12-17 DIAGNOSIS — Z13.6 CARDIOVASCULAR SCREENING; LDL GOAL LESS THAN 160: ICD-10-CM

## 2020-12-17 LAB
ALBUMIN SERPL-MCNC: 4.2 G/DL (ref 3.4–5)
ALP SERPL-CCNC: 86 U/L (ref 40–150)
ALT SERPL W P-5'-P-CCNC: 60 U/L (ref 0–70)
ANION GAP SERPL CALCULATED.3IONS-SCNC: 3 MMOL/L (ref 3–14)
AST SERPL W P-5'-P-CCNC: 33 U/L (ref 0–45)
BILIRUB SERPL-MCNC: 0.4 MG/DL (ref 0.2–1.3)
BUN SERPL-MCNC: 17 MG/DL (ref 7–30)
CALCIUM SERPL-MCNC: 9.3 MG/DL (ref 8.5–10.1)
CHLORIDE SERPL-SCNC: 106 MMOL/L (ref 94–109)
CHOLEST SERPL-MCNC: 213 MG/DL
CO2 SERPL-SCNC: 29 MMOL/L (ref 20–32)
CREAT SERPL-MCNC: 0.92 MG/DL (ref 0.66–1.25)
GFR SERPL CREATININE-BSD FRML MDRD: >90 ML/MIN/{1.73_M2}
GLUCOSE SERPL-MCNC: 98 MG/DL (ref 70–99)
HCV AB SERPL QL IA: NONREACTIVE
HDLC SERPL-MCNC: 53 MG/DL
HIV 1+2 AB+HIV1 P24 AG SERPL QL IA: NONREACTIVE
LDLC SERPL CALC-MCNC: 141 MG/DL
NONHDLC SERPL-MCNC: 160 MG/DL
POTASSIUM SERPL-SCNC: 3.7 MMOL/L (ref 3.4–5.3)
PROT SERPL-MCNC: 8.1 G/DL (ref 6.8–8.8)
PSA SERPL-ACNC: 1.69 UG/L (ref 0–4)
SODIUM SERPL-SCNC: 138 MMOL/L (ref 133–144)
TRIGL SERPL-MCNC: 97 MG/DL

## 2020-12-17 PROCEDURE — G0103 PSA SCREENING: HCPCS | Performed by: FAMILY MEDICINE

## 2020-12-17 PROCEDURE — 99396 PREV VISIT EST AGE 40-64: CPT | Mod: 25 | Performed by: FAMILY MEDICINE

## 2020-12-17 PROCEDURE — 90471 IMMUNIZATION ADMIN: CPT | Performed by: FAMILY MEDICINE

## 2020-12-17 PROCEDURE — 87389 HIV-1 AG W/HIV-1&-2 AB AG IA: CPT | Performed by: FAMILY MEDICINE

## 2020-12-17 PROCEDURE — 36415 COLL VENOUS BLD VENIPUNCTURE: CPT | Performed by: FAMILY MEDICINE

## 2020-12-17 PROCEDURE — 90750 HZV VACC RECOMBINANT IM: CPT | Performed by: FAMILY MEDICINE

## 2020-12-17 PROCEDURE — 99213 OFFICE O/P EST LOW 20 MIN: CPT | Mod: 25 | Performed by: FAMILY MEDICINE

## 2020-12-17 PROCEDURE — 80053 COMPREHEN METABOLIC PANEL: CPT | Performed by: FAMILY MEDICINE

## 2020-12-17 PROCEDURE — 80061 LIPID PANEL: CPT | Performed by: FAMILY MEDICINE

## 2020-12-17 PROCEDURE — 86803 HEPATITIS C AB TEST: CPT | Performed by: FAMILY MEDICINE

## 2020-12-17 RX ORDER — LISINOPRIL 10 MG/1
10 TABLET ORAL DAILY
Qty: 90 TABLET | Refills: 3 | Status: SHIPPED | OUTPATIENT
Start: 2020-12-17 | End: 2022-01-06

## 2020-12-17 RX ORDER — HYDROCHLOROTHIAZIDE 12.5 MG/1
TABLET ORAL
Qty: 90 TABLET | Refills: 3 | Status: SHIPPED | OUTPATIENT
Start: 2020-12-17 | End: 2022-01-06

## 2020-12-17 ASSESSMENT — MIFFLIN-ST. JEOR: SCORE: 1855.76

## 2020-12-17 NOTE — LETTER
December 18, 2020      Joselito Padilla  [address] [address] [address]        Dear ,    We are writing to inform you of your test results.    -PSA (prostate specific antigen) test is normal.  This indicates a low likelihood of prostate cancer.  ADVISE: rechecking this in 1 year.   -LDL(bad) cholesterol level is elevated which can increase your heart disease risk.  A diet high in fat and simple carbohydrates, genetics and being overweight can contribute to this. ADVISE: exercising 150 minutes of aerobic exercise per week (30 minutes for 5 days per week or 50 minutes for 3 days per week are options) and eating a low saturated fat/low carbohydrate diet are helpful to improve this. In 6 months, you should recheck your fasting cholesterol panel by scheduling a lab-only appointment.   At this point we will continue to monitor.       -Liver and gallbladder tests are normal (ALT,AST, Alk phos, bilirubin), kidney function is normal (Cr, GFR), sodium is normal, potassium is normal, calcium is normal, glucose is normal.   -Hepatitis C antibody screen test shows no signs of a previous hepatitis C infection.   -HIV test is normal.     Resulted Orders   HIV Antigen Antibody Combo   Result Value Ref Range    HIV Antigen Antibody Combo Nonreactive NR^Nonreactive          Comment:      HIV-1 p24 Ag & HIV-1/HIV-2 Ab Not Detected   Hepatitis C Screen Reflex to HCV RNA Quant and Genotype   Result Value Ref Range    Hepatitis C Antibody Nonreactive NR^Nonreactive      Comment:      Assay performance characteristics have not been established for newborns,   infants, and children     Comprehensive metabolic panel   Result Value Ref Range    Sodium 138 133 - 144 mmol/L    Potassium 3.7 3.4 - 5.3 mmol/L    Chloride 106 94 - 109 mmol/L    Carbon Dioxide 29 20 - 32 mmol/L    Anion Gap 3 3 - 14 mmol/L    Glucose 98 70 - 99 mg/dL      Comment:      Fasting specimen    Urea Nitrogen 17 7 - 30 mg/dL    Creatinine  0.92 0.66 - 1.25 mg/dL    GFR Estimate >90 >60 mL/min/[1.73_m2]      Comment:      Non  GFR Calc  Starting 12/18/2018, serum creatinine based estimated GFR (eGFR) will be   calculated using the Chronic Kidney Disease Epidemiology Collaboration   (CKD-EPI) equation.      GFR Estimate If Black >90 >60 mL/min/[1.73_m2]      Comment:       GFR Calc  Starting 12/18/2018, serum creatinine based estimated GFR (eGFR) will be   calculated using the Chronic Kidney Disease Epidemiology Collaboration   (CKD-EPI) equation.      Calcium 9.3 8.5 - 10.1 mg/dL    Bilirubin Total 0.4 0.2 - 1.3 mg/dL    Albumin 4.2 3.4 - 5.0 g/dL    Protein Total 8.1 6.8 - 8.8 g/dL    Alkaline Phosphatase 86 40 - 150 U/L    ALT 60 0 - 70 U/L    AST 33 0 - 45 U/L   Lipid panel reflex to direct LDL Fasting   Result Value Ref Range    Cholesterol 213 (H) <200 mg/dL      Comment:      Desirable:       <200 mg/dl    Triglycerides 97 <150 mg/dL      Comment:      Fasting specimen    HDL Cholesterol 53 >39 mg/dL    LDL Cholesterol Calculated 141 (H) <100 mg/dL      Comment:      Above desirable:  100-129 mg/dl  Borderline High:  130-159 mg/dL  High:             160-189 mg/dL  Very high:       >189 mg/dl      Non HDL Cholesterol 160 (H) <130 mg/dL      Comment:      Above Desirable:  130-159 mg/dl  Borderline high:  160-189 mg/dl  High:             190-219 mg/dl  Very high:       >219 mg/dl     Prostate spec antigen screen   Result Value Ref Range    PSA 1.69 0 - 4 ug/L      Comment:      Assay Method:  Chemiluminescence using Siemens Vista analyzer       If you have any questions or concerns, please call the clinic at the number listed above.       Sincerely,      Bhaskar Maldonado MD

## 2020-12-17 NOTE — PROGRESS NOTES
SUBJECTIVE:   CC: Joselito Padilla is an 50 year old male who presents for preventative health visit.     Patient has been advised of split billing requirements and indicates understanding: Yes  Healthy Habits:     Getting at least 3 servings of Calcium per day:  Yes    Bi-annual eye exam:  Yes    Dental care twice a year:  Yes    Sleep apnea or symptoms of sleep apnea:  Sleep apnea    Diet:  Regular (no restrictions)    Frequency of exercise:  4-5 days/week    Duration of exercise:  30-45 minutes    Taking medications regularly:  Yes    Medication side effects:  None, No muscle aches and No significant flushing    PHQ-2 Total Score: 0    Additional concerns today:  No        BP is stable. Depression symptoms  Stable.    Today's PHQ-2 Score:   PHQ-2 ( 1999 Pfizer) 12/14/2020   Q1: Little interest or pleasure in doing things 0   Q2: Feeling down, depressed or hopeless 0   PHQ-2 Score 0   Q1: Little interest or pleasure in doing things Not at all   Q2: Feeling down, depressed or hopeless Not at all   PHQ-2 Score 0       Abuse: Current or Past(Physical, Sexual or Emotional)- No  Do you feel safe in your environment? Yes        Social History     Tobacco Use     Smoking status: Never Smoker     Smokeless tobacco: Never Used   Substance Use Topics     Alcohol use: No     Alcohol/week: 0.0 standard drinks     If you drink alcohol do you typically have >3 drinks per day or >7 drinks per week? No    Alcohol Use 12/14/2020   Prescreen: >3 drinks/day or >7 drinks/week? Not Applicable   Prescreen: >3 drinks/day or >7 drinks/week? -   No flowsheet data found.    Last PSA:   PSA   Date Value Ref Range Status   12/31/2019 1.27 0 - 4 ug/L Final     Comment:     Assay Method:  Chemiluminescence using Siemens Vista analyzer       Reviewed orders with patient. Reviewed health maintenance and updated orders accordingly - Yes      Reviewed and updated as needed this visit by clinical staff                 Reviewed and updated  as needed this visit by Provider                    Review of Systems  CONSTITUTIONAL: NEGATIVE for fever, chills, change in weight  INTEGUMENTARY/SKIN: NEGATIVE for worrisome rashes, moles or lesions  EYES: NEGATIVE for vision changes or irritation  ENT: NEGATIVE for ear, mouth and throat problems  RESP: NEGATIVE for significant cough or SOB  CV: NEGATIVE for chest pain, palpitations or peripheral edema  GI: NEGATIVE for nausea, abdominal pain, heartburn, or change in bowel habits   male: negative for dysuria, hematuria, decreased urinary stream, erectile dysfunction, urethral discharge  MUSCULOSKELETAL: NEGATIVE for significant arthralgias or myalgia  NEURO: NEGATIVE for weakness, dizziness or paresthesias  PSYCHIATRIC: NEGATIVE for changes in mood or affect    OBJECTIVE:   There were no vitals taken for this visit.    Physical Exam  GENERAL: healthy, alert and no distress  EYES: Eyes grossly normal to inspection, PERRL and conjunctivae and sclerae normal  HENT: ear canals and TM's normal, nose and mouth without ulcers or lesions  NECK: no adenopathy, no asymmetry, masses, or scars and thyroid normal to palpation  RESP: lungs clear to auscultation - no rales, rhonchi or wheezes  CV: regular rate and rhythm, normal S1 S2, no S3 or S4, no murmur, click or rub, no peripheral edema and peripheral pulses strong  ABDOMEN: soft, nontender, no hepatosplenomegaly, no masses and bowel sounds normal  MS: no gross musculoskeletal defects noted, no edema  SKIN: no suspicious lesions or rashes  NEURO: Normal strength and tone, mentation intact and speech normal  PSYCH: mentation appears normal, affect normal/bright    Diagnostic Test Results:  Labs reviewed in Epic    ASSESSMENT/PLAN:   1. Encounter for annual physical exam  Labs ordered will follow up on.    2. Screening for HIV (human immunodeficiency virus)    - HIV Antigen Antibody Combo    3. Need for hepatitis C screening test    - Hepatitis C Screen Reflex to HCV RNA  "Quant and Genotype    4. Recurrent major depressive disorder, in remission (H)      5. Hypertension goal BP (blood pressure) < 140/90    - Comprehensive metabolic panel  - hydrochlorothiazide (HYDRODIURIL) 12.5 MG tablet; TAKE 1 TABLET(12.5 MG) BY MOUTH DAILY  Dispense: 90 tablet; Refill: 3  - lisinopril (ZESTRIL) 10 MG tablet; Take 1 tablet (10 mg) by mouth daily  Dispense: 90 tablet; Refill: 3    6. CARDIOVASCULAR SCREENING; LDL GOAL LESS THAN 160    - Comprehensive metabolic panel  - Lipid panel reflex to direct LDL Fasting    7. Screening for prostate cancer    - Prostate spec antigen screen    8. Need for vaccination    - SHINGRIX [[id number]]    Patient has been advised of split billing requirements and indicates understanding: Yes  COUNSELING:   Reviewed preventive health counseling, as reflected in patient instructions       Regular exercise       Healthy diet/nutrition    Estimated body mass index is 31.29 kg/m  as calculated from the following:    Height as of 7/13/20: 1.765 m (5' 9.5\").    Weight as of 7/13/20: 97.5 kg (215 lb).         He reports that he has never smoked. He has never used smokeless tobacco.      Counseling Resources:  ATP IV Guidelines  Pooled Cohorts Equation Calculator  FRAX Risk Assessment  ICSI Preventive Guidelines  Dietary Guidelines for Americans, 2010  USDA's MyPlate  ASA Prophylaxis  Lung CA Screening    Bhaskar Maldonado MD  [facility]  "

## 2021-02-21 DIAGNOSIS — I10 HYPERTENSION GOAL BP (BLOOD PRESSURE) < 140/90: ICD-10-CM

## 2021-02-22 RX ORDER — LISINOPRIL 10 MG/1
TABLET ORAL
Qty: 90 TABLET | Refills: 3 | OUTPATIENT
Start: 2021-02-22

## 2021-02-22 RX ORDER — HYDROCHLOROTHIAZIDE 12.5 MG/1
TABLET ORAL
Qty: 90 TABLET | Refills: 3 | OUTPATIENT
Start: 2021-02-22

## 2021-02-24 ENCOUNTER — ALLIED HEALTH/NURSE VISIT (OUTPATIENT)
Dept: NURSING | Facility: CLINIC | Age: 51
End: 2021-02-24
Payer: COMMERCIAL

## 2021-02-24 DIAGNOSIS — Z23 NEED FOR VACCINATION: Primary | ICD-10-CM

## 2021-02-24 PROCEDURE — 90471 IMMUNIZATION ADMIN: CPT

## 2021-02-24 PROCEDURE — 90750 HZV VACC RECOMBINANT IM: CPT

## 2021-02-24 PROCEDURE — 99207 PR NO CHARGE NURSE ONLY: CPT

## 2021-03-24 ENCOUNTER — IMMUNIZATION (OUTPATIENT)
Dept: PEDIATRICS | Facility: CLINIC | Age: 51
End: 2021-03-24
Payer: COMMERCIAL

## 2021-03-24 PROCEDURE — 91300 PR COVID VAC PFIZER DIL RECON 30 MCG/0.3 ML IM: CPT

## 2021-03-24 PROCEDURE — 0001A PR COVID VAC PFIZER DIL RECON 30 MCG/0.3 ML IM: CPT

## 2021-04-14 ENCOUNTER — OFFICE VISIT (OUTPATIENT)
Dept: PEDIATRICS | Facility: CLINIC | Age: 51
End: 2021-04-14
Attending: INTERNAL MEDICINE
Payer: COMMERCIAL

## 2021-04-14 PROCEDURE — 91300 PR COVID VAC PFIZER DIL RECON 30 MCG/0.3 ML IM: CPT

## 2021-04-14 PROCEDURE — 0002A PR COVID VAC PFIZER DIL RECON 30 MCG/0.3 ML IM: CPT

## 2021-06-01 ENCOUNTER — TRANSFERRED RECORDS (OUTPATIENT)
Dept: HEALTH INFORMATION MANAGEMENT | Facility: CLINIC | Age: 51
End: 2021-06-01

## 2021-09-26 ENCOUNTER — HEALTH MAINTENANCE LETTER (OUTPATIENT)
Age: 51
End: 2021-09-26

## 2022-01-03 ASSESSMENT — ENCOUNTER SYMPTOMS
NERVOUS/ANXIOUS: 0
MYALGIAS: 0
DIARRHEA: 0
HEMATOCHEZIA: 0
WEAKNESS: 0
EYE PAIN: 0
PARESTHESIAS: 0
JOINT SWELLING: 0
ABDOMINAL PAIN: 0
CONSTIPATION: 0
FREQUENCY: 0
DIZZINESS: 0
HEMATURIA: 0
ARTHRALGIAS: 0
HEARTBURN: 0
SORE THROAT: 0
DYSURIA: 0
COUGH: 0
FEVER: 0
SHORTNESS OF BREATH: 0
NAUSEA: 0
HEADACHES: 0
PALPITATIONS: 0
CHILLS: 0

## 2022-01-03 ASSESSMENT — PATIENT HEALTH QUESTIONNAIRE - PHQ9
SUM OF ALL RESPONSES TO PHQ QUESTIONS 1-9: 5
10. IF YOU CHECKED OFF ANY PROBLEMS, HOW DIFFICULT HAVE THESE PROBLEMS MADE IT FOR YOU TO DO YOUR WORK, TAKE CARE OF THINGS AT HOME, OR GET ALONG WITH OTHER PEOPLE: SOMEWHAT DIFFICULT
SUM OF ALL RESPONSES TO PHQ QUESTIONS 1-9: 5

## 2022-01-04 ASSESSMENT — PATIENT HEALTH QUESTIONNAIRE - PHQ9: SUM OF ALL RESPONSES TO PHQ QUESTIONS 1-9: 5

## 2022-01-06 ENCOUNTER — OFFICE VISIT (OUTPATIENT)
Dept: FAMILY MEDICINE | Facility: CLINIC | Age: 52
End: 2022-01-06
Payer: COMMERCIAL

## 2022-01-06 VITALS
RESPIRATION RATE: 16 BRPM | TEMPERATURE: 97.6 F | HEIGHT: 69 IN | HEART RATE: 82 BPM | DIASTOLIC BLOOD PRESSURE: 84 MMHG | OXYGEN SATURATION: 100 % | WEIGHT: 249 LBS | BODY MASS INDEX: 36.88 KG/M2 | SYSTOLIC BLOOD PRESSURE: 128 MMHG

## 2022-01-06 DIAGNOSIS — Z00.00 ROUTINE GENERAL MEDICAL EXAMINATION AT A HEALTH CARE FACILITY: Primary | ICD-10-CM

## 2022-01-06 DIAGNOSIS — I10 HYPERTENSION GOAL BP (BLOOD PRESSURE) < 140/90: ICD-10-CM

## 2022-01-06 DIAGNOSIS — F33.40 RECURRENT MAJOR DEPRESSIVE DISORDER, IN REMISSION (H): ICD-10-CM

## 2022-01-06 DIAGNOSIS — E66.01 MORBID OBESITY (H): ICD-10-CM

## 2022-01-06 DIAGNOSIS — Z12.5 SCREENING PSA (PROSTATE SPECIFIC ANTIGEN): ICD-10-CM

## 2022-01-06 DIAGNOSIS — F90.9 ATTENTION DEFICIT HYPERACTIVITY DISORDER (ADHD), UNSPECIFIED ADHD TYPE: ICD-10-CM

## 2022-01-06 DIAGNOSIS — Z13.6 CARDIOVASCULAR SCREENING; LDL GOAL LESS THAN 130: ICD-10-CM

## 2022-01-06 LAB
ALBUMIN SERPL-MCNC: 4.1 G/DL (ref 3.4–5)
ALP SERPL-CCNC: 64 U/L (ref 40–150)
ALT SERPL W P-5'-P-CCNC: 54 U/L (ref 0–70)
ANION GAP SERPL CALCULATED.3IONS-SCNC: 5 MMOL/L (ref 3–14)
AST SERPL W P-5'-P-CCNC: 28 U/L (ref 0–45)
BILIRUB SERPL-MCNC: 0.3 MG/DL (ref 0.2–1.3)
BUN SERPL-MCNC: 17 MG/DL (ref 7–30)
CALCIUM SERPL-MCNC: 9.3 MG/DL (ref 8.5–10.1)
CHLORIDE BLD-SCNC: 105 MMOL/L (ref 94–109)
CHOLEST SERPL-MCNC: 216 MG/DL
CO2 SERPL-SCNC: 28 MMOL/L (ref 20–32)
CREAT SERPL-MCNC: 0.94 MG/DL (ref 0.66–1.25)
FASTING STATUS PATIENT QL REPORTED: YES
GFR SERPL CREATININE-BSD FRML MDRD: >90 ML/MIN/1.73M2
GLUCOSE BLD-MCNC: 104 MG/DL (ref 70–99)
HDLC SERPL-MCNC: 55 MG/DL
LDLC SERPL CALC-MCNC: 134 MG/DL
NONHDLC SERPL-MCNC: 161 MG/DL
POTASSIUM BLD-SCNC: 4 MMOL/L (ref 3.4–5.3)
PROT SERPL-MCNC: 7.5 G/DL (ref 6.8–8.8)
PSA SERPL-MCNC: 1.91 UG/L (ref 0–4)
SODIUM SERPL-SCNC: 138 MMOL/L (ref 133–144)
TRIGL SERPL-MCNC: 135 MG/DL

## 2022-01-06 PROCEDURE — 80053 COMPREHEN METABOLIC PANEL: CPT | Performed by: FAMILY MEDICINE

## 2022-01-06 PROCEDURE — 99396 PREV VISIT EST AGE 40-64: CPT | Performed by: FAMILY MEDICINE

## 2022-01-06 PROCEDURE — G0103 PSA SCREENING: HCPCS | Performed by: FAMILY MEDICINE

## 2022-01-06 PROCEDURE — 80061 LIPID PANEL: CPT | Performed by: FAMILY MEDICINE

## 2022-01-06 PROCEDURE — 36415 COLL VENOUS BLD VENIPUNCTURE: CPT | Performed by: FAMILY MEDICINE

## 2022-01-06 RX ORDER — HYDROCHLOROTHIAZIDE 12.5 MG/1
TABLET ORAL
Qty: 90 TABLET | Refills: 3 | Status: SHIPPED | OUTPATIENT
Start: 2022-01-06 | End: 2022-03-03

## 2022-01-06 RX ORDER — LISINOPRIL 10 MG/1
10 TABLET ORAL DAILY
Qty: 90 TABLET | Refills: 3 | Status: SHIPPED | OUTPATIENT
Start: 2022-01-06 | End: 2022-03-03

## 2022-01-06 ASSESSMENT — MIFFLIN-ST. JEOR: SCORE: 1969.84

## 2022-01-06 NOTE — PROGRESS NOTES
SUBJECTIVE:   CC: Joselito Padilla is an 51 year old male who presents for preventative health visit.     Patient has been advised of split billing requirements and indicates understanding: Yes  Healthy Habits:     Getting at least 3 servings of Calcium per day:  Yes    Bi-annual eye exam:  Yes    Dental care twice a year:  Yes    Sleep apnea or symptoms of sleep apnea:  Sleep apnea    Diet:  Regular (no restrictions)    Frequency of exercise:  None    Duration of exercise:  N/A    Taking medications regularly:  Yes    Barriers to taking medications:  None    Medication side effects:  None    PHQ-2 Total Score: 3    Additional concerns today:  No    No issues, over all healthy, Depressions table. BP stable.          Today's PHQ-2 Score:   PHQ-2 ( 1999 [employer]) 1/3/2022   Q1: Little interest or pleasure in doing things 1   Q2: Feeling down, depressed or hopeless 2   PHQ-2 Score 3   PHQ-2 Total Score (12-17 Years)- Positive if 3 or more points; Administer PHQ-A if positive -   Q1: Little interest or pleasure in doing things Several days   Q2: Feeling down, depressed or hopeless More than half the days   PHQ-2 Score 3   Answers for HPI/ROS submitted by the patient on 1/3/2022  If you checked off any problems, how difficult have these problems made it for you to do your work, take care of things at home, or get along with other people?: Somewhat difficult  PHQ9 TOTAL SCORE: 5        Abuse: Current or Past(Physical, Sexual or Emotional)- No  Do you feel safe in your environment? Yes    Have you ever done Advance Care Planning? (For example, a Health Directive, POLST, or a discussion with a medical provider or your loved ones about your wishes): Yes, advance care planning is on file.    Social History     Tobacco Use     Smoking status: Never Smoker     Smokeless tobacco: Never Used   Substance Use Topics     Alcohol use: No     Alcohol/week: 0.0 standard drinks     If you drink alcohol do you typically have >3 drinks  "per day or >7 drinks per week? No      No flowsheet data found.    Last PSA:   PSA   Date Value Ref Range Status   12/17/2020 1.69 0 - 4 ug/L Final     Comment:     Assay Method:  Chemiluminescence using Siemens Vista analyzer       Reviewed orders with patient. Reviewed health maintenance and updated orders accordingly - Yes      Reviewed and updated as needed this visit by clinical staff  Tobacco  Allergies  Meds             Reviewed and updated as needed this visit by Provider                   Review of Systems  CONSTITUTIONAL: NEGATIVE for fever, chills, change in weight  INTEGUMENTARY/SKIN: NEGATIVE for worrisome rashes, moles or lesions  EYES: NEGATIVE for vision changes or irritation  ENT: NEGATIVE for ear, mouth and throat problems  RESP: NEGATIVE for significant cough or SOB  CV: NEGATIVE for chest pain, palpitations or peripheral edema  GI: NEGATIVE for nausea, abdominal pain, heartburn, or change in bowel habits   male: negative for dysuria, hematuria, decreased urinary stream, erectile dysfunction, urethral discharge  MUSCULOSKELETAL: NEGATIVE for significant arthralgias or myalgia  NEURO: NEGATIVE for weakness, dizziness or paresthesias  PSYCHIATRIC: NEGATIVE for changes in mood or affect    OBJECTIVE:   /84   Pulse 82   Temp 97.6  F (36.4  C) (Tympanic)   Resp 16   Ht 1.753 m (5' 9\")   Wt 112.9 kg (249 lb)   SpO2 100%   BMI 36.77 kg/m      Physical Exam  GENERAL: healthy, alert and no distress  EYES: Eyes grossly normal to inspection, PERRL and conjunctivae and sclerae normal  HENT: ear canals and TM's normal, nose and mouth without ulcers or lesions  NECK: no adenopathy, no asymmetry, masses, or scars and thyroid normal to palpation  RESP: lungs clear to auscultation - no rales, rhonchi or wheezes  CV: regular rate and rhythm, normal S1 S2, no S3 or S4, no murmur, click or rub, no peripheral edema and peripheral pulses strong  ABDOMEN: soft, nontender, no hepatosplenomegaly, no " masses and bowel sounds normal  MS: no gross musculoskeletal defects noted, no edema  SKIN: no suspicious lesions or rashes  NEURO: Normal strength and tone, mentation intact and speech normal  PSYCH: mentation appears normal, affect normal/bright    Diagnostic Test Results:  Labs reviewed in Epic    ASSESSMENT/PLAN:   Joselito was seen today for physical.    Diagnoses and all orders for this visit:    Routine general medical examination at a health care facility  -     Lipid panel reflex to direct LDL Fasting; Future  -     Comprehensive metabolic panel; Future  -     PSA, screen; Future  -     Lipid panel reflex to direct LDL Fasting  -     Comprehensive metabolic panel  -     PSA, screen    Hypertension goal BP (blood pressure) < 140/90  -     Comprehensive metabolic panel; Future  -     lisinopril (ZESTRIL) 10 MG tablet; Take 1 tablet (10 mg) by mouth daily  -     hydrochlorothiazide (HYDRODIURIL) 12.5 MG tablet; TAKE 1 TABLET(12.5 MG) BY MOUTH DAILY  -     Comprehensive metabolic panel    Morbid obesity (H)  -     Comprehensive metabolic panel; Future  -     Comprehensive metabolic panel    Recurrent major depressive disorder, in remission (H)  Sees psych  CARDIOVASCULAR SCREENING; LDL GOAL LESS THAN 130  -     Lipid panel reflex to direct LDL Fasting; Future  -     Comprehensive metabolic panel; Future  -     Lipid panel reflex to direct LDL Fasting  -     Comprehensive metabolic panel    Attention deficit hyperactivity disorder (ADHD), unspecified ADHD type  Sees psych  Screening PSA (prostate specific antigen)  -     PSA, screen; Future  -     PSA, screen        Patient has been advised of split billing requirements and indicates understanding: Yes  COUNSELING:   Reviewed preventive health counseling, as reflected in patient instructions       Regular exercise       Healthy diet/nutrition    Estimated body mass index is 36.77 kg/m  as calculated from the following:    Height as of this encounter: 1.753 m (5'  "9\").    Weight as of this encounter: 112.9 kg (249 lb).         He reports that he has never smoked. He has never used smokeless tobacco.      Counseling Resources:  ATP IV Guidelines  Pooled Cohorts Equation Calculator  FRAX Risk Assessment  ICSI Preventive Guidelines  Dietary Guidelines for Americans, 2010  USDA's MyPlate  ASA Prophylaxis  Lung CA Screening    Bhaskar Maldonado MD  [facility]  "

## 2022-03-02 DIAGNOSIS — I10 HYPERTENSION GOAL BP (BLOOD PRESSURE) < 140/90: ICD-10-CM

## 2022-03-03 RX ORDER — LISINOPRIL 10 MG/1
TABLET ORAL
Qty: 90 TABLET | Refills: 2 | Status: SHIPPED | OUTPATIENT
Start: 2022-03-03 | End: 2023-02-01

## 2022-03-03 RX ORDER — HYDROCHLOROTHIAZIDE 12.5 MG/1
TABLET ORAL
Qty: 90 TABLET | Refills: 2 | Status: SHIPPED | OUTPATIENT
Start: 2022-03-03 | End: 2023-02-01

## 2022-03-03 NOTE — TELEPHONE ENCOUNTER
Prescription approved per [facility] Refill Protocol.    Nichole DUMONT RN  EP Triage

## 2022-05-11 ENCOUNTER — VIRTUAL VISIT (OUTPATIENT)
Dept: FAMILY MEDICINE | Facility: CLINIC | Age: 52
End: 2022-05-11
Payer: COMMERCIAL

## 2022-05-11 DIAGNOSIS — L72.9 SKIN CYST: Primary | ICD-10-CM

## 2022-05-11 PROCEDURE — 99213 OFFICE O/P EST LOW 20 MIN: CPT | Mod: 95 | Performed by: FAMILY MEDICINE

## 2022-05-11 NOTE — PROGRESS NOTES
Sea is a 51 year old who is being evaluated via a billable video visit.      How would you like to obtain your AVS? MyChart  If the video visit is dropped, the invitation should be resent by: Text to cell phone: [phone]  Will anyone else be joining your video visit? No      Video Start Time: 3:50    Assessment & Plan     Skin cyst  Exact etiology not clear however this is small cystlike bump on the forehead which appeared 2 to 3 weeks ago maybe before that.  Its not increasing in size there is no surrounding redness I suggested continue observation sometimes it will resolve on its own if it is persist I would like to see him back in the clinic in 2  weeks.  Any change in color or any change in size he needs to report back immediately.             Return in about 2 weeks (around 5/25/2022) for if symptom does not get better.    Bhaskar Maldonado MD  [facility]    Subjective   Sea is a 51 year old who presents for the following health issues     History of Present Illness       Reason for visit:  Bump developed on forehead.  Need to confirm if I need to see a specialist  Symptom onset:  3-4 weeks ago  Symptom intensity:  Mild  Symptom progression:  Staying the same  Had these symptoms before:  No    He eats 2-3 servings of fruits and vegetables daily.He consumes 1 sweetened beverage(s) daily.He exercises with enough effort to increase his heart rate 30 to 60 minutes per day.  He exercises with enough effort to increase his heart rate 5 days per week.   He is taking medications regularly.         Review of Systems   Constitutional, HEENT, cardiovascular, pulmonary, gi and gu systems are negative, except as otherwise noted.      Objective           Vitals:  No vitals were obtained today due to virtual visit.    Physical Exam   GENERAL: Healthy, alert and no distress  EYES: Eyes grossly normal to inspection.  No discharge or erythema, or obvious scleral/conjunctival abnormalities.  RESP: No  audible wheeze, cough, or visible cyanosis.  No visible retractions or increased work of breathing.    SKIN: Visible skin clear. No significant rash, abnormal pigmentation or lesions.  Patient forehead has a small cystlike structure which he thinks is mobile there is no surrounding redness or any discharge from that.              Video-Visit Details    Type of service:  Video Visit    Video End Time:3:58 PM    Originating Location (pt. Location): Home    Distant Location (provider location):  [facility]     Platform used for Video Visit: [employer]

## 2022-05-20 ENCOUNTER — TRANSFERRED RECORDS (OUTPATIENT)
Dept: HEALTH INFORMATION MANAGEMENT | Facility: CLINIC | Age: 52
End: 2022-05-20
Payer: COMMERCIAL

## 2022-05-31 ENCOUNTER — OFFICE VISIT (OUTPATIENT)
Dept: FAMILY MEDICINE | Facility: CLINIC | Age: 52
End: 2022-05-31
Payer: COMMERCIAL

## 2022-05-31 VITALS
DIASTOLIC BLOOD PRESSURE: 89 MMHG | OXYGEN SATURATION: 99 % | RESPIRATION RATE: 11 BRPM | SYSTOLIC BLOOD PRESSURE: 133 MMHG | HEART RATE: 98 BPM | BODY MASS INDEX: 37.07 KG/M2 | TEMPERATURE: 96.5 F | WEIGHT: 251 LBS

## 2022-05-31 DIAGNOSIS — L72.9 SCALP CYST: Primary | ICD-10-CM

## 2022-05-31 PROCEDURE — 99213 OFFICE O/P EST LOW 20 MIN: CPT | Performed by: FAMILY MEDICINE

## 2022-05-31 ASSESSMENT — PAIN SCALES - GENERAL: PAINLEVEL: NO PAIN (0)

## 2022-05-31 NOTE — PROGRESS NOTES
Assessment & Plan     Scalp cyst  Patient likely has scalp cyst which seems benign.  However he has area almost dime size.  Suggested continue observation versus evaluation from dermatology.  He was 2 weeks if is not improved he will see dermatology for further evaluation.  Reassured him appearance is most likely benign without any infection.  - Adult Dermatology Referral; Future           Return in about 2 weeks (around 6/14/2022) for if symptom does not get better.    Bhaskar Maldonado MD  [facility]    Jennifer Osei is a 51 year old who presents for the following health issues     History of Present Illness       Reason for visit:  Bump on forehead    He eats 2-3 servings of fruits and vegetables daily.He consumes 0 sweetened beverage(s) daily.He exercises with enough effort to increase his heart rate 30 to 60 minutes per day.  He exercises with enough effort to increase his heart rate 5 days per week.   He is taking medications regularly.     Patient came today to evaluate a bump on the left side of the forehead.  He noticed that for almost 3 to 4 weeks ago.  It was evaluated which still he was advised if is not improved follow-up.  Denies any redness erythema no pain.      Review of Systems   Constitutional, HEENT, cardiovascular, pulmonary, gi and gu systems are negative, except as otherwise noted.      Objective    /89   Pulse 98   Temp (!) 96.5  F (35.8  C) (Tympanic)   Resp 11   Wt 113.9 kg (251 lb)   SpO2 99%   BMI 37.07 kg/m    Body mass index is 37.07 kg/m .  Physical Exam   Skin in  the forehead has a small round cystlike area freely mobile not surrounding erythema there is no GENERAL: healthy, alert and no distress

## 2022-08-15 ENCOUNTER — VIRTUAL VISIT (OUTPATIENT)
Dept: FAMILY MEDICINE | Facility: OTHER | Age: 52
End: 2022-08-15
Payer: COMMERCIAL

## 2022-08-15 DIAGNOSIS — U07.1 INFECTION DUE TO 2019 NOVEL CORONAVIRUS: Primary | ICD-10-CM

## 2022-08-15 PROCEDURE — 99214 OFFICE O/P EST MOD 30 MIN: CPT | Mod: 95 | Performed by: STUDENT IN AN ORGANIZED HEALTH CARE EDUCATION/TRAINING PROGRAM

## 2022-08-15 ASSESSMENT — PATIENT HEALTH QUESTIONNAIRE - PHQ9
SUM OF ALL RESPONSES TO PHQ QUESTIONS 1-9: 0
SUM OF ALL RESPONSES TO PHQ QUESTIONS 1-9: 0

## 2022-08-15 NOTE — PATIENT INSTRUCTIONS

## 2022-08-15 NOTE — PROGRESS NOTES
Sea is a 51 year old who is being evaluated via a billable video visit.      How would you like to obtain your AVS? MyChart    Will anyone else be joining your video visit? No          Assessment & Plan     Infection due to 2019 novel coronavirus  Will initiate oral antiviral treatment at this time for the patient.  We discussed risks and benefits of this option as well as alternative options along with those risks and benefits.  We also discussed about conservative/symptomatic measures, quarantine recommendations, and worrisome signs and symptoms in the event that more urgent/emergent medical treatment is needed.  They voiced their understanding to all this.  Additional COVID-19 information is outlined on the AVS.    Follow-up as needed or if symptoms worsen.        - nirmatrelvir and ritonavir (PAXLOVID) therapy pack; Take 3 tablets by mouth 2 times daily for 5 days (Take 2 Nirmatrelvir tablets and 1 Ritonavir tablet twice daily for 5 days)      JESICA LAUREN MD  [facility]   Sea is a 51 year old presenting for the following health issues:  COVID   HPI     2 days worth of aches, sore throat, cough, HA  No SOB no CP  Tested pos 2 days ago    Review of Systems   Constitutional, HEENT, cardiovascular, pulmonary, gi and gu systems are negative, except as otherwise noted.      Objective           Vitals:  No vitals were obtained today due to virtual visit.    Physical Exam   GENERAL: Healthy, alert and no distress  EYES: Eyes grossly normal to inspection.  No discharge or erythema, or obvious scleral/conjunctival abnormalities.  RESP: No audible wheeze, cough, or visible cyanosis.  No visible retractions or increased work of breathing.    SKIN: Visible skin clear. No significant rash, abnormal pigmentation or lesions.  NEURO: Cranial nerves grossly intact.  Mentation and speech appropriate for age.  PSYCH: Mentation appears normal, affect normal/bright, judgement and insight  intact, normal speech and appearance well-groomed.            Video-Visit Details    Video Start Time: 11:40 AM    Type of service:  Video Visit    Video End Time:11:47 AM    Originating Location (pt. Location): Home    Distant Location (provider location):  [facility]     Platform used for Video Visit: [employer]    .  ..

## 2022-09-08 ENCOUNTER — OFFICE VISIT (OUTPATIENT)
Dept: URGENT CARE | Facility: URGENT CARE | Age: 52
End: 2022-09-08
Payer: COMMERCIAL

## 2022-09-08 ENCOUNTER — HOSPITAL ENCOUNTER (EMERGENCY)
Facility: CLINIC | Age: 52
Discharge: ANOTHER HOSPITAL | End: 2022-09-10
Attending: EMERGENCY MEDICINE | Admitting: EMERGENCY MEDICINE
Payer: COMMERCIAL

## 2022-09-08 ENCOUNTER — OFFICE VISIT (OUTPATIENT)
Dept: PEDIATRICS | Facility: CLINIC | Age: 52
End: 2022-09-08
Payer: COMMERCIAL

## 2022-09-08 ENCOUNTER — HOSPITAL ENCOUNTER (OUTPATIENT)
Dept: CT IMAGING | Facility: CLINIC | Age: 52
Discharge: HOME | End: 2022-09-08
Attending: PHYSICIAN ASSISTANT | Admitting: PHYSICIAN ASSISTANT
Payer: COMMERCIAL

## 2022-09-08 VITALS
DIASTOLIC BLOOD PRESSURE: 100 MMHG | TEMPERATURE: 97.9 F | OXYGEN SATURATION: 96 % | SYSTOLIC BLOOD PRESSURE: 130 MMHG | HEART RATE: 91 BPM | RESPIRATION RATE: 20 BRPM

## 2022-09-08 VITALS
OXYGEN SATURATION: 98 % | DIASTOLIC BLOOD PRESSURE: 96 MMHG | TEMPERATURE: 98 F | BODY MASS INDEX: 36.33 KG/M2 | RESPIRATION RATE: 18 BRPM | SYSTOLIC BLOOD PRESSURE: 133 MMHG | WEIGHT: 246 LBS | HEART RATE: 95 BPM

## 2022-09-08 DIAGNOSIS — I10 HYPERTENSION GOAL BP (BLOOD PRESSURE) < 140/90: ICD-10-CM

## 2022-09-08 DIAGNOSIS — R06.00 DYSPNEA, UNSPECIFIED TYPE: ICD-10-CM

## 2022-09-08 DIAGNOSIS — R79.89 ELEVATED BRAIN NATRIURETIC PEPTIDE (BNP) LEVEL: ICD-10-CM

## 2022-09-08 DIAGNOSIS — R79.89 ELEVATED D-DIMER: ICD-10-CM

## 2022-09-08 DIAGNOSIS — I26.09 ACUTE PULMONARY EMBOLISM WITH ACUTE COR PULMONALE, UNSPECIFIED PULMONARY EMBOLISM TYPE (H): Primary | ICD-10-CM

## 2022-09-08 DIAGNOSIS — R06.02 SHORTNESS OF BREATH: Primary | ICD-10-CM

## 2022-09-08 DIAGNOSIS — R07.89 CHEST TIGHTNESS: ICD-10-CM

## 2022-09-08 DIAGNOSIS — I26.99 BILATERAL PULMONARY EMBOLISM (H): ICD-10-CM

## 2022-09-08 LAB
ALBUMIN SERPL-MCNC: 4 G/DL (ref 3.4–5)
ALP SERPL-CCNC: 75 U/L (ref 40–150)
ALT SERPL W P-5'-P-CCNC: 42 U/L (ref 0–70)
ANION GAP SERPL CALCULATED.3IONS-SCNC: 5 MMOL/L (ref 3–14)
AST SERPL W P-5'-P-CCNC: 23 U/L (ref 0–45)
BASOPHILS # BLD AUTO: 0 10E3/UL (ref 0–0.2)
BASOPHILS NFR BLD AUTO: 1 %
BILIRUB SERPL-MCNC: 0.4 MG/DL (ref 0.2–1.3)
BUN SERPL-MCNC: 15 MG/DL (ref 7–30)
CALCIUM SERPL-MCNC: 9.9 MG/DL (ref 8.5–10.1)
CHLORIDE BLD-SCNC: 102 MMOL/L (ref 94–109)
CO2 SERPL-SCNC: 29 MMOL/L (ref 20–32)
CREAT SERPL-MCNC: 1.08 MG/DL (ref 0.66–1.25)
D DIMER PPP FEU-MCNC: 2.95 UG/ML FEU (ref 0–0.5)
EOSINOPHIL # BLD AUTO: 0.3 10E3/UL (ref 0–0.7)
EOSINOPHIL NFR BLD AUTO: 5 %
ERYTHROCYTE [DISTWIDTH] IN BLOOD BY AUTOMATED COUNT: 13.6 % (ref 10–15)
GFR SERPL CREATININE-BSD FRML MDRD: 83 ML/MIN/1.73M2
GLUCOSE BLD-MCNC: 103 MG/DL (ref 70–99)
HCT VFR BLD AUTO: 46.7 % (ref 40–53)
HGB BLD-MCNC: 15.3 G/DL (ref 13.3–17.7)
LYMPHOCYTES # BLD AUTO: 1.3 10E3/UL (ref 0.8–5.3)
LYMPHOCYTES NFR BLD AUTO: 21 %
MCH RBC QN AUTO: 28 PG (ref 26.5–33)
MCHC RBC AUTO-ENTMCNC: 32.8 G/DL (ref 31.5–36.5)
MCV RBC AUTO: 86 FL (ref 78–100)
MONOCYTES # BLD AUTO: 0.5 10E3/UL (ref 0–1.3)
MONOCYTES NFR BLD AUTO: 8 %
NEUTROPHILS # BLD AUTO: 4.1 10E3/UL (ref 1.6–8.3)
NEUTROPHILS NFR BLD AUTO: 65 %
NT-PROBNP SERPL-MCNC: 1254 PG/ML (ref 0–900)
PLATELET # BLD AUTO: 202 10E3/UL (ref 150–450)
POTASSIUM BLD-SCNC: 3.8 MMOL/L (ref 3.4–5.3)
PROT SERPL-MCNC: 7.8 G/DL (ref 6.8–8.8)
RBC # BLD AUTO: 5.46 10E6/UL (ref 4.4–5.9)
SARS-COV-2 RNA RESP QL NAA+PROBE: POSITIVE
SODIUM SERPL-SCNC: 136 MMOL/L (ref 133–144)
TROPONIN I SERPL HS-MCNC: 75 NG/L
TROPONIN T SERPL HS-MCNC: 20 NG/L
UFH PPP CHRO-ACNC: 0.89 IU/ML
WBC # BLD AUTO: 6.3 10E3/UL (ref 4–11)

## 2022-09-08 PROCEDURE — 99207 REFERRAL TO ACUTE AND DIAGNOSTIC SERVICES: CPT | Performed by: NURSE PRACTITIONER

## 2022-09-08 PROCEDURE — 84484 ASSAY OF TROPONIN QUANT: CPT | Performed by: NURSE PRACTITIONER

## 2022-09-08 PROCEDURE — 36415 COLL VENOUS BLD VENIPUNCTURE: CPT | Performed by: NURSE PRACTITIONER

## 2022-09-08 PROCEDURE — 36415 COLL VENOUS BLD VENIPUNCTURE: CPT | Performed by: EMERGENCY MEDICINE

## 2022-09-08 PROCEDURE — 82310 ASSAY OF CALCIUM: CPT | Performed by: PHYSICIAN ASSISTANT

## 2022-09-08 PROCEDURE — 93005 ELECTROCARDIOGRAM TRACING: CPT | Performed by: PHYSICIAN ASSISTANT

## 2022-09-08 PROCEDURE — 99207 PR INPT ADMISSION FROM CLINIC: CPT | Performed by: PHYSICIAN ASSISTANT

## 2022-09-08 PROCEDURE — 258N000003 HC RX IP 258 OP 636: Performed by: PHYSICIAN ASSISTANT

## 2022-09-08 PROCEDURE — 250N000011 HC RX IP 250 OP 636: Performed by: EMERGENCY MEDICINE

## 2022-09-08 PROCEDURE — 85025 COMPLETE CBC W/AUTO DIFF WBC: CPT | Performed by: NURSE PRACTITIONER

## 2022-09-08 PROCEDURE — 83880 ASSAY OF NATRIURETIC PEPTIDE: CPT | Performed by: EMERGENCY MEDICINE

## 2022-09-08 PROCEDURE — 80053 COMPREHEN METABOLIC PANEL: CPT | Performed by: NURSE PRACTITIONER

## 2022-09-08 PROCEDURE — 93000 ELECTROCARDIOGRAM COMPLETE: CPT | Performed by: NURSE PRACTITIONER

## 2022-09-08 PROCEDURE — C9803 HOPD COVID-19 SPEC COLLECT: HCPCS

## 2022-09-08 PROCEDURE — 96360 HYDRATION IV INFUSION INIT: CPT | Mod: 59

## 2022-09-08 PROCEDURE — 93005 ELECTROCARDIOGRAM TRACING: CPT

## 2022-09-08 PROCEDURE — 71275 CT ANGIOGRAPHY CHEST: CPT

## 2022-09-08 PROCEDURE — 85379 FIBRIN DEGRADATION QUANT: CPT | Performed by: NURSE PRACTITIONER

## 2022-09-08 PROCEDURE — U0005 INFEC AGEN DETEC AMPLI PROBE: HCPCS | Performed by: EMERGENCY MEDICINE

## 2022-09-08 PROCEDURE — 250N000011 HC RX IP 250 OP 636: Performed by: PHYSICIAN ASSISTANT

## 2022-09-08 PROCEDURE — 99285 EMERGENCY DEPT VISIT HI MDM: CPT | Mod: CS,25

## 2022-09-08 PROCEDURE — 84484 ASSAY OF TROPONIN QUANT: CPT | Performed by: EMERGENCY MEDICINE

## 2022-09-08 PROCEDURE — 85520 HEPARIN ASSAY: CPT | Performed by: EMERGENCY MEDICINE

## 2022-09-08 RX ORDER — HEPARIN SODIUM 10000 [USP'U]/100ML
0-5000 INJECTION, SOLUTION INTRAVENOUS CONTINUOUS
Status: DISCONTINUED | OUTPATIENT
Start: 2022-09-08 | End: 2022-09-10 | Stop reason: HOSPADM

## 2022-09-08 RX ORDER — IOPAMIDOL 755 MG/ML
500 INJECTION, SOLUTION INTRAVASCULAR ONCE
Status: COMPLETED | OUTPATIENT
Start: 2022-09-08 | End: 2022-09-08

## 2022-09-08 RX ADMIN — HEPARIN SODIUM AND DEXTROSE 1800 UNITS/HR: 10000; 5 INJECTION INTRAVENOUS at 18:33

## 2022-09-08 RX ADMIN — SODIUM CHLORIDE 90 ML: 9 INJECTION, SOLUTION INTRAVENOUS at 13:57

## 2022-09-08 RX ADMIN — IOPAMIDOL 67 ML: 755 INJECTION, SOLUTION INTRAVENOUS at 13:56

## 2022-09-08 ASSESSMENT — ENCOUNTER SYMPTOMS
CHEST TIGHTNESS: 1
SHORTNESS OF BREATH: 1

## 2022-09-08 ASSESSMENT — ACTIVITIES OF DAILY LIVING (ADL)
ADLS_ACUITY_SCORE: 35

## 2022-09-08 NOTE — PROGRESS NOTES
(I26.09) Acute pulmonary embolism (mulitple) with presumed acute cor pulmonale, unspecified pulmonary embolism type (H)  (primary encounter diagnosis)  Comment: Discussed case with ED physician at [facility] ED  (Dr. GARY Davis)  Plan: Patient transported to ED via wheelchair by MA for further evaluation and treatment now.    (R79.89) Elevated d-dimer  Comment:   Plan: CT Chest Pulmonary Embolism w Contrast, sodium         chloride (PF) 0.9% PF flush 3 mL, IV access            (R06.00) Dyspnea, unspecified type  Comment:   Plan: CT Chest Pulmonary Embolism w Contrast, sodium         chloride (PF) 0.9% PF flush 3 mL, IV access,         EKG 12-lead, tracing only              Subjective   Sea is a 51 year old referred to ADS by Jeane Persaud NP at [facility] for dyspnea for the past 3 days, worse with exertion.  D-dimer in clinic was found to be 2.94.   He is accompanied by his spouse, presenting for the following health issues:  Breathing Problem (SOB X 2-3 days)    Of note is that he did have Covid about 3 weeks ago, and also recently was on a 5 hour flight on 8/24 and 8/28 (round trip to [place] dropping their son off at college).    HPI     Concern - SOB  Onset: X 2-3 days  Description: SOB when doing minimal activities.  Denies chest pain or heart palpitations  Intensity: moderate  Progression of Symptoms:  same  Accompanying Signs & Symptoms: light HA starting earlier today  Previous history of similar problem: none  Precipitating factors:        Worsened by: none, other then getting up and moving around  Alleviating factors:        Improved by: none, other then sitting down and relaxing  Therapies tried and outcome:  none       Review of Systems   Constitutional, HEENT, cardiovascular, pulmonary, GI, , musculoskeletal, neuro, skin, endocrine and psych systems are negative, except as otherwise noted.      Objective    Wt 112.5 kg (248 lb)   BMI 36.62 kg/m    Body mass index is 36.62 kg/m .  Physical Exam    GENERAL: healthy, alert and no distress  RESP: lungs clear to auscultation - no rales, rhonchi or wheezes  CV: regular rate and rhythm, normal S1 S2, no S3 or S4, no murmur, click or rub, no peripheral edema and peripheral pulses strong  MS: no gross musculoskeletal defects noted, no edema    EKG - Reviewed and interpreted by me Normal Sinus Rhythm, no LVH by voltage criteria, unchanged from previous tracings, Negative T-waves in V1-V5    Previous troponin I drawn at urgent care was  75 (wnl)    Results for orders placed or performed during the hospital encounter of 09/08/22   CT Chest Pulmonary Embolism w Contrast     Status: None (Preliminary result)    Narrative    CT CHEST PULMONARY EMBOLISM WITH CONTRAST  9/8/2022 2:00 PM    HISTORY:  Elevated D-dimer today 2.95 with dyspnea. Elevated D-dimer.  Dyspnea, unspecified type.    TECHNIQUE: Scans obtained from the apices through the diaphragm with  IV contrast. 67 cc of Isovue-370 IV injected. Radiation dose for this  scan was reduced using automated exposure control, adjustment of the  mA and/or kV according to patient size, or iterative reconstruction  technique.    COMPARISON:  Chest x-ray on same day earlier.    FINDINGS:  Chest/mediastinum: Extensive bilateral pulmonary emboli in the lobar  and segmental pulmonary arteries. Mild straightening of the  interventricular septum, suggesting right heart strain. No  cardiomegaly or significant pericardial effusion. No significant  mediastinal or hilar lymphadenopathy. No significant atherosclerotic  vascular calcification of the coronary arteries.    Lungs and pleura: No pleural effusion or pneumothorax. Mild basilar  pulmonary opacities, likely atelectasis. Few scattered calcified  pulmonary granulomas.    Upper abdomen: Limited evaluation of the upper abdomen due to lack of  coverage and timing of contrast. Diffuse hypoattenuation of the liver,  likely due to underlying hepatic steatosis. Fatty infiltration of  the  pancreas.    Bones and soft tissue: No suspicious osseous lesion.      Impression    IMPRESSION:   1. Extensive bilateral pulmonary emboli in the lower and segmental  pulmonary arteries. Mild straightening of the interventricular septum,  suggesting right heart strain.  2. Hepatic steatosis.   Results for orders placed or performed in visit on 09/08/22   XR Chest 2 Views     Status: None    Narrative    XR CHEST 2 VIEWS 9/8/2022 11:24 AM    HISTORY: Shortness of breath; Chest tightness    COMPARISON: None.      Impression    IMPRESSION: No pleural effusion or pneumothorax. Subtle nodular  opacities projecting over bilateral lung bases could represent  superimposed nipple shadow and rib shadows, and less likely  infiltrates. No other focal airspace opacities in the lungs. Normal  heart size.    SYD SERNA MD         SYSTEM ID:  [id number]   Results for orders placed or performed in visit on 09/08/22   Troponin I     Status: Normal   Result Value Ref Range    Troponin I High Sensitivity 75 <79 ng/L   D dimer, quantitative     Status: Abnormal   Result Value Ref Range    D-Dimer Quantitative 2.95 (H) 0.00 - 0.50 ug/mL FEU    Narrative    This D-dimer assay is intended for use in conjunction with a clinical pretest probability assessment model to exclude pulmonary embolism (PE) and deep venous thrombosis (DVT) in outpatients suspected of PE or DVT. The cut-off value is 0.50 ug/mL FEU.   Comprehensive metabolic panel (BMP + Alb, Alk Phos, ALT, AST, Total. Bili, TP)     Status: Abnormal   Result Value Ref Range    Sodium 136 133 - 144 mmol/L    Potassium 3.8 3.4 - 5.3 mmol/L    Chloride 102 94 - 109 mmol/L    Carbon Dioxide (CO2) 29 20 - 32 mmol/L    Anion Gap 5 3 - 14 mmol/L    Urea Nitrogen 15 7 - 30 mg/dL    Creatinine 1.08 0.66 - 1.25 mg/dL    Calcium 9.9 8.5 - 10.1 mg/dL    Glucose 103 (H) 70 - 99 mg/dL    Alkaline Phosphatase 75 40 - 150 U/L    AST 23 0 - 45 U/L    ALT 42 0 - 70 U/L    Protein Total 7.8 6.8 -  8.8 g/dL    Albumin 4.0 3.4 - 5.0 g/dL    Bilirubin Total 0.4 0.2 - 1.3 mg/dL    GFR Estimate 83 >60 mL/min/1.73m2   CBC with platelets and differential     Status: None   Result Value Ref Range    WBC Count 6.3 4.0 - 11.0 10e3/uL    RBC Count 5.46 4.40 - 5.90 10e6/uL    Hemoglobin 15.3 13.3 - 17.7 g/dL    Hematocrit 46.7 40.0 - 53.0 %    MCV 86 78 - 100 fL    MCH 28.0 26.5 - 33.0 pg    MCHC 32.8 31.5 - 36.5 g/dL    RDW 13.6 10.0 - 15.0 %    Platelet Count 202 150 - 450 10e3/uL    % Neutrophils 65 %    % Lymphocytes 21 %    % Monocytes 8 %    % Eosinophils 5 %    % Basophils 1 %    Absolute Neutrophils 4.1 1.6 - 8.3 10e3/uL    Absolute Lymphocytes 1.3 0.8 - 5.3 10e3/uL    Absolute Monocytes 0.5 0.0 - 1.3 10e3/uL    Absolute Eosinophils 0.3 0.0 - 0.7 10e3/uL    Absolute Basophils 0.0 0.0 - 0.2 10e3/uL   CBC with platelets and differential     Status: None    Narrative    The following orders were created for panel order CBC with platelets and differential.  Procedure                               Abnormality         Status                     ---------                               -----------         ------                     CBC with platelets and d...[[id number]]                      Final result                 Please view results for these tests on the individual orders.                  EKG Interpretation:      Interpreted by Fifi Leo PA-C  Time reviewed: 1418   Symptoms at time of EKG: mild dyspnea   Rhythm: Normal sinus   Rate: Normal  Axis: Normal  Ectopy: None  Conduction: Normal  ST Segments/ T Waves: ST segment depression in multiple leads  Q Waves: None  Comparison to prior: Unchanged from earlier today

## 2022-09-08 NOTE — PROGRESS NOTES
Chief Complaint   Patient presents with     Shortness of Breath     PT has had shortness of breath the last 2 days.          ICD-10-CM    1. Shortness of breath  R06.02 EKG 12-lead complete w/read - Clinics     Troponin I     D dimer, quantitative     CBC with platelets and differential     Comprehensive metabolic panel (BMP + Alb, Alk Phos, ALT, AST, Total. Bili, TP)     XR Chest 2 Views     Troponin I     D dimer, quantitative     CBC with platelets and differential     Comprehensive metabolic panel (BMP + Alb, Alk Phos, ALT, AST, Total. Bili, TP)   2. Chest tightness  R07.89 EKG 12-lead complete w/read - Clinics     Troponin I     D dimer, quantitative     Comprehensive metabolic panel (BMP + Alb, Alk Phos, ALT, AST, Total. Bili, TP)     XR Chest 2 Views     Troponin I     D dimer, quantitative     Comprehensive metabolic panel (BMP + Alb, Alk Phos, ALT, AST, Total. Bili, TP)   3. Hypertension goal BP (blood pressure) < 140/90  I10 Comprehensive metabolic panel (BMP + Alb, Alk Phos, ALT, AST, Total. Bili, TP)     Comprehensive metabolic panel (BMP + Alb, Alk Phos, ALT, AST, Total. Bili, TP)   Call placed to [facility], spoke with provider Fifi Franklin and she will be happy to see patient.  Patient is given directions to facility and instructed not to eat or drink prior to his arrival.    45 minutes spent on the date of the encounter doing chart review, history and exam, documentation and further activities per the note    Medical Decision Making    Differential diagnosis includes but is not limited to: COPD exacerbation, bronchitis, asthma, pulmonary embolus, acute coronary syndrome, abdominal or thoracic aneurysm, aortic dissection, allergies, reactive airway disease, lung cancer, pericarditis, cardiac tamponade, lung contusion, rib fracture, foreign body aspiration, tracheitis, supraglottitis.      Xray - Reviewed and interpreted by me.  Chest x-ray is within normal limits, normal heart size, no  evidence of effusions, pneumothorax, atelectasis or pneumonia.  EKG - Reviewed and interpreted by me appears normal, NSR, occasional PVC noted, unifocal    Results for orders placed or performed in visit on 09/08/22 (from the past 24 hour(s))   Troponin I   Result Value Ref Range    Troponin I High Sensitivity 75 <79 ng/L   D dimer, quantitative   Result Value Ref Range    D-Dimer Quantitative 2.95 (H) 0.00 - 0.50 ug/mL FEU    Narrative    This D-dimer assay is intended for use in conjunction with a clinical pretest probability assessment model to exclude pulmonary embolism (PE) and deep venous thrombosis (DVT) in outpatients suspected of PE or DVT. The cut-off value is 0.50 ug/mL FEU.   CBC with platelets and differential    Narrative    The following orders were created for panel order CBC with platelets and differential.  Procedure                               Abnormality         Status                     ---------                               -----------         ------                     CBC with platelets and d...[[id number]]                      Final result                 Please view results for these tests on the individual orders.   Comprehensive metabolic panel (BMP + Alb, Alk Phos, ALT, AST, Total. Bili, TP)   Result Value Ref Range    Sodium 136 133 - 144 mmol/L    Potassium 3.8 3.4 - 5.3 mmol/L    Chloride 102 94 - 109 mmol/L    Carbon Dioxide (CO2) 29 20 - 32 mmol/L    Anion Gap 5 3 - 14 mmol/L    Urea Nitrogen 15 7 - 30 mg/dL    Creatinine 1.08 0.66 - 1.25 mg/dL    Calcium 9.9 8.5 - 10.1 mg/dL    Glucose 103 (H) 70 - 99 mg/dL    Alkaline Phosphatase 75 40 - 150 U/L    AST 23 0 - 45 U/L    ALT 42 0 - 70 U/L    Protein Total 7.8 6.8 - 8.8 g/dL    Albumin 4.0 3.4 - 5.0 g/dL    Bilirubin Total 0.4 0.2 - 1.3 mg/dL    GFR Estimate 83 >60 mL/min/1.73m2   CBC with platelets and differential   Result Value Ref Range    WBC Count 6.3 4.0 - 11.0 10e3/uL    RBC Count 5.46 4.40 - 5.90 10e6/uL    Hemoglobin  15.3 13.3 - 17.7 g/dL    Hematocrit 46.7 40.0 - 53.0 %    MCV 86 78 - 100 fL    MCH 28.0 26.5 - 33.0 pg    MCHC 32.8 31.5 - 36.5 g/dL    RDW 13.6 10.0 - 15.0 %    Platelet Count 202 150 - 450 10e3/uL    % Neutrophils 65 %    % Lymphocytes 21 %    % Monocytes 8 %    % Eosinophils 5 %    % Basophils 1 %    Absolute Neutrophils 4.1 1.6 - 8.3 10e3/uL    Absolute Lymphocytes 1.3 0.8 - 5.3 10e3/uL    Absolute Monocytes 0.5 0.0 - 1.3 10e3/uL    Absolute Eosinophils 0.3 0.0 - 0.7 10e3/uL    Absolute Basophils 0.0 0.0 - 0.2 10e3/uL       Subjective     Joselito Padilla is an 51 year old male who presents to clinic today for shortness of breath for the last week.  He did have a 5-hour flight recently.  Father had a heart attack at a similar age.    ROS: 10 point ROS neg other than the symptoms noted above in the HPI.       Objective    BP (!) 130/100   Pulse 91   Temp 97.9  F (36.6  C) (Tympanic)   Resp 20   SpO2 96%   Nurses notes and VS have been reviewed.    Physical Exam       GENERAL APPEARANCE: healthy appearing, alert     EYES: PERRL, EOMI, sclera non-icteric     HENT: oral exam benign, mucus membranes intact, without ulcers or lesions     NECK: no adenopathy or asymmetry, thyroid normal to palpation     RESP: lungs clear to auscultation - no rales, rhonchi or wheezes     CV: regular rates and rhythm, no murmurs, rubs, or gallop     ABDOMEN:  soft, nontender, no HSM or masses and bowel sounds normal     MS: extremities normal- no gross deformities noted; normal muscle tone.     SKIN: no suspicious lesions or rashes     NEURO: Normal strength and tone, mentation intact and speech normal     PSYCH: normal thought process; no significant mood disturbance    Patient Instructions   Patient is instructed to go to the [facility] immediately for further assessment or treatment.      Instructed him not to eat or drink prior to his arrival.      Lexi Persaud, APRN, CNP  [facility] Provider    The use of  Dragon/Nimia dictation services may have been used to construct the content in this note; any grammatical or spelling errors are non-intentional. Please contact the author of this note directly if you are in need of any clarification.

## 2022-09-08 NOTE — PATIENT INSTRUCTIONS
(I26.09) Acute pulmonary embolism with acute cor pulmonale, unspecified pulmonary embolism type (H)  (primary encounter diagnosis)  Comment: Discussed case with ED physician at [facility] ED   Plan: Patient transported to ED via wheelchair by MA for further evaluation and treatment now.    (R79.89) Elevated d-dimer  Comment:   Plan: CT Chest Pulmonary Embolism w Contrast, sodium         chloride (PF) 0.9% PF flush 3 mL, IV access            (R06.00) Dyspnea, unspecified type  Comment:   Plan: CT Chest Pulmonary Embolism w Contrast, sodium         chloride (PF) 0.9% PF flush 3 mL, IV access,         EKG 12-lead, tracing only

## 2022-09-08 NOTE — Clinical Note
Thank you for the referral of Mr. Padilla today.  CT revealed multiple pulmonary emboli and likely right heart strain, repeat EKG revealed ST segment depression in V leads.  Patient was transported to ED via wheelchair for further evaluation and treatment.   Fifi

## 2022-09-08 NOTE — PATIENT INSTRUCTIONS
Patient is instructed to go to the [facility] immediately for further assessment or treatment.      Instructed him not to eat or drink prior to his arrival.

## 2022-09-08 NOTE — ED TRIAGE NOTES
Pt reports onset of chest tightness and SOB 2 days ago. SOB worse with exertion. Sent from clinic today after blood work and CT scan revealed PE. ABCs intact.

## 2022-09-08 NOTE — ED PROVIDER NOTES
History     Chief Complaint:  Shortness of Breath    The history is provided by the patient and medical records.      Joselito Padilla is a 51 year old male with hypertension who presents with shortness of breath.  Sea had COVID-19 for which he was given Paxlovid 8/15/2022.  After he recovered he traveled to [place] by airplane, returning 8/28/2022.  2 days ago he noticed he was dyspneic with exertion with chest tightness.  He found this unusual as he typically can walk 3 miles a day without any symptoms.  When his symptoms did not improve he went to urgent care where he was found to have an elevated D-dimer.  He was referred to the acute diagnostic center where a CT was performed, as below, revealing bilateral pulmonary emboli.  He does not have any chest pain at rest.  He has never had lower extremity pain or swelling.  He has no history of malignancy.  There is no family history of bleeding or clotting disorders although an uncle did die of PE.    Studies urgent care 9/8/2022  Troponin I: 75  D-dimer: 2.95  CBC: All within normal limits, hemoglobin 15.3  CMP: All within normal limits, creatinine 0.92  CXR: No pleural effusion or pneumothorax. Subtle nodular  opacities projecting over bilateral lung bases could represent  superimposed nipple shadow and rib shadows, and less likely  infiltrates. No other focal airspace opacities in the lungs. Normal  heart size.    Acute diagnostic services CT chest pulmonary embolism with contrast 9/8/2022  1. Extensive bilateral pulmonary emboli in the lower and segmental  pulmonary arteries. Mild straightening of the interventricular septum,  suggesting right heart strain.  2. Hepatic steatosis.    Review of Systems   Respiratory: Positive for chest tightness and shortness of breath.    Cardiovascular: Negative for chest pain and leg swelling.   All other systems reviewed and are negative.    Allergies:  No Known Allergies    Medications:    ARIPiprazole (ABILIFY) 2 MG  tablet  atomoxetine (STRATTERA) 80 MG capsule  hydrochlorothiazide (HYDRODIURIL) 12.5 MG tablet  lisinopril (ZESTRIL) 10 MG tablet  Multiple Vitamins-Minerals (MULTIVITAMIN ADULT PO)  PRISTIQ 100 MG TB24 24 hr tablet    Past Medical History:    Past Medical History:   Diagnosis Date     Achilles tendon pain 11/25/2013     ADHD      Patient Active Problem List    Diagnosis Date Noted     Hypertension goal BP (blood pressure) < 140/90 11/25/2013     Priority: High     Morbid obesity (H) 01/06/2022     Priority: Medium     Francisco's deformity, 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     Priority: Medium     Other postprocedural status(V45.89) 09/30/2015     Priority: Medium     Sleep apnea 04/07/2015     Priority: Medium     CARDIOVASCULAR SCREENING; LDL GOAL LESS THAN 130 11/12/2014     Priority: Medium     Depression, major, recurrent (H) 10/13/2014     Priority: Medium        Past Surgical History:    Past Surgical History:   Procedure Laterality Date     DENTAL SURGERY  1989    [place] teeth     heel surgery      bilateral     VASECTOMY  2007       Family History:    Family History   Problem Relation Age of Onset     Pulmonary Hypertension Mother      Hypertension Father      Heart Disease Father         bypass surgery     C.A.D. Father      Prostate Cancer Father        Social History:  The patient presents to the ED with his wife.  Does not smoke.  Typically walks 3 miles per day.    Physical Exam     Patient Vitals for the past 24 hrs:   BP Temp Temp src Pulse Resp SpO2   09/08/22 2215 -- -- -- 98 22 91 %   09/08/22 2130 (!) 155/99 -- -- 94 16 96 %   09/08/22 2100 -- -- -- 98 12 91 %   09/08/22 2000 (!) 132/104 -- -- 88 27 92 %   09/08/22 1900 -- -- -- 96 25 91 %   09/08/22 1800 (!) 141/101 -- -- 98 17 94 %   09/08/22 1730 (!) 142/99 -- -- 96 14 92 %   09/08/22 1700 (!) 139/99 -- -- 89 28 96 %   09/08/22 1630 (!) 134/101 -- -- 97 28 92 %   09/08/22 1504 (!) 136/108 98.4  F (36.9  C) Temporal 78 20 96 %     Physical  Exam  General: Well-developed and well-nourished. Well appearing middle aged  man. Cooperative.  Head:  Atraumatic.  Eyes:  Conjunctivae, lids, and sclerae are normal.  Neck:  Supple. Normal range of motion.  CV:  Regular rate and rhythm. Normal heart sounds with no murmurs, rubs, or gallops detected.  Resp:  No respiratory distress. Clear to auscultation bilaterally without decreased breath sounds, wheezing, rales, or rhonchi.  GI:  Soft. Non-distended. Non-tender.    MS:  Normal ROM. No bilateral lower extremity edema or calf tenderness.  Skin:  Warm. Non-diaphoretic. No pallor.  Neuro:  Awake. A&Ox3. Normal strength.  Psych: Normal mood and affect. Normal speech.  Vitals reviewed.    Emergency Department Course   EKG  Indication: Shortness of breath  Time: 1510  Rate 93 bpm. AL interval 146. QRS duration 88. QT/QTc 338/420.   Normal sinus rhythm  Nonspecific T wave abnormality  Abnormal ECG  No acute ST changes.  ST and T wave changes in V1 through V3 are new as compared to prior, dated 10/15/2014.    Laboratory:  Labs Ordered and Resulted from Time of ED Arrival to Time of ED Departure   NT PROBNP INPATIENT - Abnormal       Result Value    N terminal Pro BNP Inpatient 1,254 (*)    COVID-19 VIRUS (CORONAVIRUS) BY PCR - Abnormal    SARS CoV2 PCR Positive (*)    TROPONIN T, HIGH SENSITIVITY - Normal    Troponin T, High Sensitivity 20       Emergency Department Course:  Reviewed:  I reviewed nursing notes, vitals, and past medical history.    Assessments:   I obtained history and examined the patient as noted above.   1904 I rechecked the patient and explained findings and plan.     Consults:   1740 I spoke with Dr. Greenberg, IR.  1753 I spoke with Dr. Greenberg again.  1917 I spoke with [facility] [place] who confirmed they have no available beds.    Interventions:  1832 Heparin 8,000 U IV  1833 Heparin 1,800 U/hr IV    Disposition:  Care of the patient was transferred to my colleague Dr. Krueger pending bed  availability for transfer.     Impression & Plan    Medical Decision Making:  Sea is a 51 year old man with HTN who had COVID-19 recently as well as plane travel to [place] who has had 2 days of exertional dyspnea and chest tightness.  He had an outpatient work-up with an elevated D-dimer and CT scan revealing bilateral pulmonary emboli described as extensive with mild straightening of the interventricular septum suggesting right heart strain.  Outpatient troponin is normal.  Repeat troponin here is normal.  EKG does have some ST and T wave changes anteriorly that appear new.  He does not appear volume overloaded but BNP is elevated at 1,254.  I spoke with Dr. Greenberg, interventional radiology, who agrees that patient would likely benefit from thrombectomy given the clot burden as well as borderline RV strain, borderline hypoxia (about 91%), and mild elevation in BNP.  He recommends initiation of heparin and transfer to [facility] for this procedure.  Unfortunately, [facility] has no available beds.  [facility] and none are available.  As such, he will board in the emergency department pending availability at [facility] or other hospital that can perform his thrombectomy.  Of note, he is COVID-19 positive but this is considered recovered as he had symptoms and a positive test 1 month ago.  He required no interventions while in the emergency department and was hemodynamically stable.  He remains on heparin and awaiting transfer to a facility that can perform his thrombectomy at the end of my shift.  He was endorsed to my partner, Dr. Krueger.  We will keep him NPO after midnight.    Covid-19  Joselito Padilla was evaluated during a global COVID-19 pandemic, which necessitated consideration that the patient might be at risk for infection with the SARS-CoV-2 virus that causes COVID-19.   Applicable protocols for evaluation were followed during the patient's care.   COVID-19 was  considered as part of the patient's evaluation.    Diagnosis:    ICD-10-CM    1. Bilateral pulmonary embolism (H)  I26.99    2. Elevated brain natriuretic peptide (BNP) level  R79.89           Maria Guadalupe Warner MD  09/08/22 [id number]

## 2022-09-09 ENCOUNTER — APPOINTMENT (OUTPATIENT)
Dept: CARDIOLOGY | Facility: CLINIC | Age: 52
End: 2022-09-09
Attending: EMERGENCY MEDICINE
Payer: COMMERCIAL

## 2022-09-09 LAB
ANION GAP SERPL CALCULATED.3IONS-SCNC: 13 MMOL/L (ref 7–15)
ATRIAL RATE - MUSE: 93 BPM
BUN SERPL-MCNC: 12.8 MG/DL (ref 6–20)
CALCIUM SERPL-MCNC: 9.5 MG/DL (ref 8.6–10)
CHLORIDE SERPL-SCNC: 100 MMOL/L (ref 98–107)
CREAT SERPL-MCNC: 1.1 MG/DL (ref 0.67–1.17)
DEPRECATED HCO3 PLAS-SCNC: 25 MMOL/L (ref 22–29)
DIASTOLIC BLOOD PRESSURE - MUSE: NORMAL MMHG
GFR SERPL CREATININE-BSD FRML MDRD: 81 ML/MIN/1.73M2
GLUCOSE SERPL-MCNC: 88 MG/DL (ref 70–99)
HOLD SPECIMEN: NORMAL
INTERPRETATION ECG - MUSE: NORMAL
LVEF ECHO: NORMAL
P AXIS - MUSE: 54 DEGREES
POTASSIUM SERPL-SCNC: 4 MMOL/L (ref 3.4–5.3)
PR INTERVAL - MUSE: 146 MS
QRS DURATION - MUSE: 88 MS
QT - MUSE: 338 MS
QTC - MUSE: 420 MS
R AXIS - MUSE: 10 DEGREES
SODIUM SERPL-SCNC: 138 MMOL/L (ref 136–145)
SYSTOLIC BLOOD PRESSURE - MUSE: NORMAL MMHG
T AXIS - MUSE: 24 DEGREES
UFH PPP CHRO-ACNC: 0.51 IU/ML
UFH PPP CHRO-ACNC: 0.57 IU/ML
UFH PPP CHRO-ACNC: 0.73 IU/ML
VENTRICULAR RATE- MUSE: 93 BPM

## 2022-09-09 PROCEDURE — 250N000011 HC RX IP 250 OP 636: Performed by: EMERGENCY MEDICINE

## 2022-09-09 PROCEDURE — 99207 PR NO BILLABLE SERVICE THIS VISIT: CPT | Performed by: PHYSICIAN ASSISTANT

## 2022-09-09 PROCEDURE — 36415 COLL VENOUS BLD VENIPUNCTURE: CPT | Performed by: EMERGENCY MEDICINE

## 2022-09-09 PROCEDURE — 258N000003 HC RX IP 258 OP 636: Performed by: EMERGENCY MEDICINE

## 2022-09-09 PROCEDURE — 250N000013 HC RX MED GY IP 250 OP 250 PS 637: Performed by: EMERGENCY MEDICINE

## 2022-09-09 PROCEDURE — 93306 TTE W/DOPPLER COMPLETE: CPT | Mod: 26 | Performed by: INTERNAL MEDICINE

## 2022-09-09 PROCEDURE — 999N000208 ECHOCARDIOGRAM COMPLETE

## 2022-09-09 PROCEDURE — 85520 HEPARIN ASSAY: CPT | Performed by: EMERGENCY MEDICINE

## 2022-09-09 PROCEDURE — 255N000002 HC RX 255 OP 636: Performed by: EMERGENCY MEDICINE

## 2022-09-09 RX ORDER — DESVENLAFAXINE 100 MG/1
100 TABLET, EXTENDED RELEASE ORAL DAILY
Status: DISCONTINUED | OUTPATIENT
Start: 2022-09-10 | End: 2022-09-10 | Stop reason: HOSPADM

## 2022-09-09 RX ORDER — BUTALBITAL, ACETAMINOPHEN AND CAFFEINE 50; 325; 40 MG/1; MG/1; MG/1
1 TABLET ORAL ONCE
Status: COMPLETED | OUTPATIENT
Start: 2022-09-09 | End: 2022-09-09

## 2022-09-09 RX ORDER — ONDANSETRON 4 MG/1
4 TABLET, ORALLY DISINTEGRATING ORAL EVERY 6 HOURS PRN
Status: DISCONTINUED | OUTPATIENT
Start: 2022-09-09 | End: 2022-09-10 | Stop reason: HOSPADM

## 2022-09-09 RX ORDER — LISINOPRIL 10 MG/1
10 TABLET ORAL DAILY
Status: DISCONTINUED | OUTPATIENT
Start: 2022-09-10 | End: 2022-09-10 | Stop reason: HOSPADM

## 2022-09-09 RX ORDER — ATOMOXETINE 40 MG/1
80 CAPSULE ORAL DAILY
Status: DISCONTINUED | OUTPATIENT
Start: 2022-09-10 | End: 2022-09-10 | Stop reason: HOSPADM

## 2022-09-09 RX ORDER — LIDOCAINE 40 MG/G
CREAM TOPICAL
Status: DISCONTINUED | OUTPATIENT
Start: 2022-09-09 | End: 2022-09-10 | Stop reason: HOSPADM

## 2022-09-09 RX ORDER — OXYCODONE HYDROCHLORIDE 5 MG/1
5 TABLET ORAL EVERY 4 HOURS PRN
Status: DISCONTINUED | OUTPATIENT
Start: 2022-09-09 | End: 2022-09-10 | Stop reason: HOSPADM

## 2022-09-09 RX ORDER — HYDROCHLOROTHIAZIDE 12.5 MG/1
12.5 TABLET ORAL DAILY
Status: DISCONTINUED | OUTPATIENT
Start: 2022-09-10 | End: 2022-09-10 | Stop reason: HOSPADM

## 2022-09-09 RX ORDER — AMOXICILLIN 250 MG
1 CAPSULE ORAL 2 TIMES DAILY PRN
Status: DISCONTINUED | OUTPATIENT
Start: 2022-09-09 | End: 2022-09-10 | Stop reason: HOSPADM

## 2022-09-09 RX ORDER — ARIPIPRAZOLE 5 MG/1
5 TABLET ORAL DAILY
COMMUNITY

## 2022-09-09 RX ORDER — ACETAMINOPHEN 500 MG
500 TABLET ORAL ONCE
Status: COMPLETED | OUTPATIENT
Start: 2022-09-09 | End: 2022-09-09

## 2022-09-09 RX ORDER — ACETAMINOPHEN 500 MG
1000 TABLET ORAL EVERY 6 HOURS PRN
Status: DISCONTINUED | OUTPATIENT
Start: 2022-09-09 | End: 2022-09-10 | Stop reason: HOSPADM

## 2022-09-09 RX ORDER — POLYETHYLENE GLYCOL 3350 17 G/17G
17 POWDER, FOR SOLUTION ORAL 2 TIMES DAILY PRN
Status: DISCONTINUED | OUTPATIENT
Start: 2022-09-09 | End: 2022-09-10 | Stop reason: HOSPADM

## 2022-09-09 RX ADMIN — BUTALBITAL, ACETAMINOPHEN AND CAFFEINE 1 TABLET: 50; 325; 40 TABLET ORAL at 17:13

## 2022-09-09 RX ADMIN — HEPARIN SODIUM AND DEXTROSE 1700 UNITS/HR: 10000; 5 INJECTION INTRAVENOUS at 05:21

## 2022-09-09 RX ADMIN — SODIUM CHLORIDE 500 ML: 9 INJECTION, SOLUTION INTRAVENOUS at 17:13

## 2022-09-09 RX ADMIN — ACETAMINOPHEN 500 MG: 500 TABLET, FILM COATED ORAL at 12:06

## 2022-09-09 RX ADMIN — HUMAN ALBUMIN MICROSPHERES AND PERFLUTREN 3 ML: 10; .22 INJECTION, SOLUTION INTRAVENOUS at 15:21

## 2022-09-09 RX ADMIN — HEPARIN SODIUM AND DEXTROSE 1600 UNITS/HR: 10000; 5 INJECTION INTRAVENOUS at 15:57

## 2022-09-09 ASSESSMENT — ACTIVITIES OF DAILY LIVING (ADL)
ADLS_ACUITY_SCORE: 35

## 2022-09-09 NOTE — ED PROVIDER NOTES
Patient signed over to me by Dr. Schwartz.  Please see her note for further details.  Patient had no acute events on my shift.  We are awaiting bed availability at [facility] for IR procedure He is in stable condition at this time and was signed over to Dr. Sinclair.     Jeancarlos Krueger MD  09/09/22 0803

## 2022-09-09 NOTE — ED PROVIDER NOTES
Received patient in sign-out from Dr. Krueger.  PE awaiting transfer for thrombectomy.  Hemodynamically stable.  Provided acetaminophen for headache.  Re-checked with patient placement and still awaiting bed availability.     Tmi Sinclair MD  09/09/22 4783

## 2022-09-09 NOTE — ED PROVIDER NOTES
Received s/o from Dr. Sinclair. Bed will not be available at [facility] until 9/10/22.    Pt on heparin gtt.    Echocardiogram ordered given concern for right heart strain on CT.     Nidhi Burden, DO  22 1430        Echocardiogram result below. The hospitalist team was able to consult and help with home meds and basic PRNs with a recommendation to go back to NPO status after midnight. The patient is not admitted at [facility] at this time and will hopefully transfer to [facility] tomorrow.    Recent Results (from the past 4320 hour(s))   Echocardiogram Complete   Result Value    LVEF  60-65%    Narrative    [id number]  [id number]  [id number]  [id number]^JENISE^NIDHI^COLLEEN     [facility]  Echocardiography Laboratory  [address] [place] [address]     Name: SUSANA FERNANDES  MRN: [id number]  : 1970  Study Date: 2022 02:44 PM  Age: 51 yrs  Gender: Male  Patient Location: [facility]  Reason For Study: Pulmonary Embolism  Ordering Physician: NIDHI BURDEN  Performed By: Camilla Escobedo     BSA: 2.3 m2  Height: 69 in  Weight: 246 lb  HR: 80  BP: 143/102 mmHg  ______________________________________________________________________________  Procedure  Complete Portable Echo Adult. Optison (NDC #8935-4901) given intravenously.  ______________________________________________________________________________  Interpretation Summary     Technically difficult imaging  Moderately decreased right ventricular systolic function ( best seen on short  axis parasternal veiws.)  The right ventricle is mild to moderately dilated.( best seen on short axis  parasternal views)  Flattened septum is consistent with RV pressure overload.  Left ventricular systolic function is normal.  The visual ejection fraction is 60-65%.  The left ventricle is normal in size.  Doppler interrogation does not demonstrate signficant stenosis or  insufficinecy involving cardiac valves.     No old studies available for  comparison     ______________________________________________________________________________  Left Ventricle  The left ventricle is normal in size. There is normal left ventricular wall  thickness. Left ventricular systolic function is normal. The visual ejection  fraction is 60-65%. Flattened septum is consistent with RV pressure overload.  There is no thrombus seen in the left ventricle.     Right Ventricle  The right ventricle is mild to moderately dilated. Moderately decreased right  ventricular systolic function. There is no mass or thrombus in the right  ventricle.     Atria  Normal left atrial size. Right atrial size is normal. There is no atrial shunt  seen. The left atrial appendage is not well visualized.     Mitral Valve  The mitral valve leaflets appear normal. There is no evidence of stenosis,  fluttering, or prolapse. There is no mitral regurgitation noted. There is no  mitral valve stenosis.     Tricuspid Valve  Normal tricuspid valve. No tricuspid regurgitation. Right ventricular systolic  pressure could not be approximated due to inadequate tricuspid regurgitation.  There is no tricuspid stenosis.     Aortic Valve  The aortic valve is trileaflet. No aortic regurgitation is present. No aortic  stenosis is present.     Pulmonic Valve  Normal pulmonic valve. There is no pulmonic valvular regurgitation. Increased  pulmonic valve velocity.     Vessels  The aortic root is normal size. Normal size ascending aorta. The inferior vena  cava is normal. The pulmonary artery is normal size.     Pericardium  The pericardium appears normal. There is no pleural effusion.     Rhythm  Sinus rhythm was noted.  ______________________________________________________________________________  MMode/2D Measurements & Calculations     IVSd: 0.87 cm  LVIDd: 4.2 cm  LVIDs: 2.7 cm  LVPWd: 1.0 cm  FS: 35.2 %  LV mass(C)d: 125.2 grams  LV mass(C)dI: 55.5 grams/m2  Ao root diam: 3.2 cm  LA dimension: 3.5 cm  asc Aorta Diam: 3.5  cm  LA/Ao: 1.1  LVOT diam: 2.0 cm  LVOT area: 3.1 cm2  LA Volume (BP): 35.8 ml  LA Volume Index (BP): 15.8 ml/m2  RWT: 0.48     Doppler Measurements & Calculations  MV E max brandan: 66.1 cm/sec  MV A max brandan: 61.2 cm/sec  MV E/A: 1.1  MV dec time: 0.17 sec  LV V1 max P.3 mmHg  LV V1 max: 104.0 cm/sec  LV V1 VTI: 18.3 cm  SV(LVOT): 57.5 ml  SI(LVOT): 25.5 ml/m2  PA acc time: 0.08 sec  E/E' av.1  Lateral E/e': 5.1  Medial E/e': 9.1     ______________________________________________________________________________  Report approved by: Dr. Sj Noriega 2022 03:42 PM                  Hernesto White DO  22 2575

## 2022-09-09 NOTE — PROGRESS NOTES
Per Dr. Warner's note on 9/8/22, patient had COVID on 8/15/22 and is COVID recovered status now.    .9/9/2022  .Tita Bonds, Infection Prevention

## 2022-09-10 ENCOUNTER — HOSPITAL ENCOUNTER (INPATIENT)
Facility: CLINIC | Age: 52
LOS: 3 days | Discharge: HOME | End: 2022-09-13
Attending: INTERNAL MEDICINE | Admitting: INTERNAL MEDICINE
Payer: COMMERCIAL

## 2022-09-10 ENCOUNTER — APPOINTMENT (OUTPATIENT)
Dept: INTERVENTIONAL RADIOLOGY/VASCULAR | Facility: CLINIC | Age: 52
End: 2022-09-10
Attending: RADIOLOGY
Payer: COMMERCIAL

## 2022-09-10 ENCOUNTER — APPOINTMENT (OUTPATIENT)
Dept: ULTRASOUND IMAGING | Facility: CLINIC | Age: 52
End: 2022-09-10
Attending: INTERNAL MEDICINE
Payer: COMMERCIAL

## 2022-09-10 VITALS
OXYGEN SATURATION: 97 % | TEMPERATURE: 98.4 F | SYSTOLIC BLOOD PRESSURE: 153 MMHG | RESPIRATION RATE: 16 BRPM | DIASTOLIC BLOOD PRESSURE: 107 MMHG | HEART RATE: 91 BPM

## 2022-09-10 DIAGNOSIS — I26.99 PULMONARY EMBOLISM, UNSPECIFIED CHRONICITY, UNSPECIFIED PULMONARY EMBOLISM TYPE, UNSPECIFIED WHETHER ACUTE COR PULMONALE PRESENT (H): Primary | ICD-10-CM

## 2022-09-10 LAB
ACT BLD: 152 SECONDS (ref 74–150)
ACT BLD: 182 SECONDS (ref 74–150)
ALBUMIN SERPL-MCNC: 3.5 G/DL (ref 3.4–5)
ALP SERPL-CCNC: 68 U/L (ref 40–150)
ALT SERPL W P-5'-P-CCNC: 37 U/L (ref 0–70)
ANION GAP SERPL CALCULATED.3IONS-SCNC: 12 MMOL/L (ref 7–15)
ANION GAP SERPL CALCULATED.3IONS-SCNC: 4 MMOL/L (ref 3–14)
AST SERPL W P-5'-P-CCNC: 18 U/L (ref 0–45)
BASOPHILS # BLD AUTO: 0.1 10E3/UL (ref 0–0.2)
BASOPHILS NFR BLD AUTO: 1 %
BILIRUB SERPL-MCNC: 0.4 MG/DL (ref 0.2–1.3)
BUN SERPL-MCNC: 14.3 MG/DL (ref 6–20)
BUN SERPL-MCNC: 16 MG/DL (ref 7–30)
CALCIUM SERPL-MCNC: 9.1 MG/DL (ref 8.5–10.1)
CALCIUM SERPL-MCNC: 9.6 MG/DL (ref 8.6–10)
CHLORIDE BLD-SCNC: 107 MMOL/L (ref 94–109)
CHLORIDE SERPL-SCNC: 105 MMOL/L (ref 98–107)
CO2 SERPL-SCNC: 29 MMOL/L (ref 20–32)
CREAT SERPL-MCNC: 0.8 MG/DL (ref 0.67–1.17)
CREAT SERPL-MCNC: 0.85 MG/DL (ref 0.66–1.25)
DEPRECATED HCO3 PLAS-SCNC: 22 MMOL/L (ref 22–29)
EOSINOPHIL # BLD AUTO: 0.5 10E3/UL (ref 0–0.7)
EOSINOPHIL NFR BLD AUTO: 6 %
ERYTHROCYTE [DISTWIDTH] IN BLOOD BY AUTOMATED COUNT: 13.2 % (ref 10–15)
ERYTHROCYTE [DISTWIDTH] IN BLOOD BY AUTOMATED COUNT: 13.4 % (ref 10–15)
GFR SERPL CREATININE-BSD FRML MDRD: >90 ML/MIN/1.73M2
GFR SERPL CREATININE-BSD FRML MDRD: >90 ML/MIN/1.73M2
GLUCOSE BLD-MCNC: 105 MG/DL (ref 70–99)
GLUCOSE SERPL-MCNC: 112 MG/DL (ref 70–99)
HCT VFR BLD AUTO: 45.6 % (ref 40–53)
HCT VFR BLD AUTO: 54.3 % (ref 40–53)
HGB BLD-MCNC: 14.8 G/DL (ref 13.3–17.7)
HGB BLD-MCNC: 17.3 G/DL (ref 13.3–17.7)
IMM GRANULOCYTES # BLD: 0.3 10E3/UL
IMM GRANULOCYTES NFR BLD: 3 %
LYMPHOCYTES # BLD AUTO: 2 10E3/UL (ref 0.8–5.3)
LYMPHOCYTES NFR BLD AUTO: 26 %
MCH RBC QN AUTO: 27.9 PG (ref 26.5–33)
MCH RBC QN AUTO: 28.1 PG (ref 26.5–33)
MCHC RBC AUTO-ENTMCNC: 31.9 G/DL (ref 31.5–36.5)
MCHC RBC AUTO-ENTMCNC: 32.5 G/DL (ref 31.5–36.5)
MCV RBC AUTO: 87 FL (ref 78–100)
MCV RBC AUTO: 88 FL (ref 78–100)
MONOCYTES # BLD AUTO: 0.9 10E3/UL (ref 0–1.3)
MONOCYTES NFR BLD AUTO: 11 %
NEUTROPHILS # BLD AUTO: 4.1 10E3/UL (ref 1.6–8.3)
NEUTROPHILS NFR BLD AUTO: 53 %
NRBC # BLD AUTO: 0 10E3/UL
NRBC BLD AUTO-RTO: 0 /100
PLATELET # BLD AUTO: 205 10E3/UL (ref 150–450)
PLATELET # BLD AUTO: 219 10E3/UL (ref 150–450)
POTASSIUM BLD-SCNC: 3.8 MMOL/L (ref 3.4–5.3)
POTASSIUM SERPL-SCNC: 4.6 MMOL/L (ref 3.4–5.3)
POTASSIUM SERPL-SCNC: 5.9 MMOL/L (ref 3.4–5.3)
PROT SERPL-MCNC: 7.2 G/DL (ref 6.8–8.8)
RBC # BLD AUTO: 5.27 10E6/UL (ref 4.4–5.9)
RBC # BLD AUTO: 6.19 10E6/UL (ref 4.4–5.9)
SODIUM SERPL-SCNC: 139 MMOL/L (ref 136–145)
SODIUM SERPL-SCNC: 140 MMOL/L (ref 133–144)
UFH PPP CHRO-ACNC: 0.42 IU/ML
UFH PPP CHRO-ACNC: 0.65 IU/ML
UFH PPP CHRO-ACNC: <0.1 IU/ML
UFH PPP CHRO-ACNC: >1.1 IU/ML
WBC # BLD AUTO: 6.8 10E3/UL (ref 4–11)
WBC # BLD AUTO: 7.7 10E3/UL (ref 4–11)

## 2022-09-10 PROCEDURE — 250N000013 HC RX MED GY IP 250 OP 250 PS 637: Performed by: PHYSICIAN ASSISTANT

## 2022-09-10 PROCEDURE — 250N000013 HC RX MED GY IP 250 OP 250 PS 637: Performed by: INTERNAL MEDICINE

## 2022-09-10 PROCEDURE — 36415 COLL VENOUS BLD VENIPUNCTURE: CPT | Performed by: EMERGENCY MEDICINE

## 2022-09-10 PROCEDURE — 06H03DZ INSERTION OF INTRALUMINAL DEVICE INTO INFERIOR VENA CAVA, PERCUTANEOUS APPROACH: ICD-10-PCS | Performed by: RADIOLOGY

## 2022-09-10 PROCEDURE — 999N000157 HC STATISTIC RCP TIME EA 10 MIN

## 2022-09-10 PROCEDURE — 250N000011 HC RX IP 250 OP 636: Performed by: INTERNAL MEDICINE

## 2022-09-10 PROCEDURE — 85520 HEPARIN ASSAY: CPT | Performed by: EMERGENCY MEDICINE

## 2022-09-10 PROCEDURE — 85004 AUTOMATED DIFF WBC COUNT: CPT | Performed by: PHYSICIAN ASSISTANT

## 2022-09-10 PROCEDURE — 255N000002 HC RX 255 OP 636: Performed by: RADIOLOGY

## 2022-09-10 PROCEDURE — 272N000196 HC ACCESSORY CR5

## 2022-09-10 PROCEDURE — C1880 VENA CAVA FILTER: HCPCS

## 2022-09-10 PROCEDURE — C1769 GUIDE WIRE: HCPCS

## 2022-09-10 PROCEDURE — 93970 EXTREMITY STUDY: CPT

## 2022-09-10 PROCEDURE — 85520 HEPARIN ASSAY: CPT | Performed by: INTERNAL MEDICINE

## 2022-09-10 PROCEDURE — 84132 ASSAY OF SERUM POTASSIUM: CPT | Performed by: INTERNAL MEDICINE

## 2022-09-10 PROCEDURE — 99152 MOD SED SAME PHYS/QHP 5/>YRS: CPT

## 2022-09-10 PROCEDURE — 36415 COLL VENOUS BLD VENIPUNCTURE: CPT | Performed by: INTERNAL MEDICINE

## 2022-09-10 PROCEDURE — 99223 1ST HOSP IP/OBS HIGH 75: CPT | Mod: AI | Performed by: INTERNAL MEDICINE

## 2022-09-10 PROCEDURE — 85027 COMPLETE CBC AUTOMATED: CPT | Performed by: INTERNAL MEDICINE

## 2022-09-10 PROCEDURE — 02CR3ZZ EXTIRPATION OF MATTER FROM LEFT PULMONARY ARTERY, PERCUTANEOUS APPROACH: ICD-10-PCS | Performed by: RADIOLOGY

## 2022-09-10 PROCEDURE — 272N000194 HC ACCESSORY CR3

## 2022-09-10 PROCEDURE — 37191 INS ENDOVAS VENA CAVA FILTR: CPT

## 2022-09-10 PROCEDURE — 84132 ASSAY OF SERUM POTASSIUM: CPT | Performed by: EMERGENCY MEDICINE

## 2022-09-10 PROCEDURE — 272N000566 HC SHEATH CR3

## 2022-09-10 PROCEDURE — 250N000011 HC RX IP 250 OP 636: Performed by: RADIOLOGY

## 2022-09-10 PROCEDURE — 272N000116 HC CATH CR1

## 2022-09-10 PROCEDURE — 250N000009 HC RX 250: Performed by: RADIOLOGY

## 2022-09-10 PROCEDURE — 02CQ3ZZ EXTIRPATION OF MATTER FROM RIGHT PULMONARY ARTERY, PERCUTANEOUS APPROACH: ICD-10-PCS | Performed by: RADIOLOGY

## 2022-09-10 PROCEDURE — 120N000001 HC R&B MED SURG/OB

## 2022-09-10 PROCEDURE — 82310 ASSAY OF CALCIUM: CPT | Performed by: PHYSICIAN ASSISTANT

## 2022-09-10 PROCEDURE — 85347 COAGULATION TIME ACTIVATED: CPT

## 2022-09-10 PROCEDURE — 94660 CPAP INITIATION&MGMT: CPT

## 2022-09-10 PROCEDURE — 36015 PLACE CATHETER IN ARTERY: CPT

## 2022-09-10 PROCEDURE — 36415 COLL VENOUS BLD VENIPUNCTURE: CPT | Performed by: PHYSICIAN ASSISTANT

## 2022-09-10 RX ORDER — NALOXONE HYDROCHLORIDE 0.4 MG/ML
0.4 INJECTION, SOLUTION INTRAMUSCULAR; INTRAVENOUS; SUBCUTANEOUS
Status: DISCONTINUED | OUTPATIENT
Start: 2022-09-10 | End: 2022-09-10 | Stop reason: HOSPADM

## 2022-09-10 RX ORDER — FLUMAZENIL 0.1 MG/ML
0.2 INJECTION, SOLUTION INTRAVENOUS
Status: DISCONTINUED | OUTPATIENT
Start: 2022-09-10 | End: 2022-09-10 | Stop reason: HOSPADM

## 2022-09-10 RX ORDER — NALOXONE HYDROCHLORIDE 0.4 MG/ML
0.2 INJECTION, SOLUTION INTRAMUSCULAR; INTRAVENOUS; SUBCUTANEOUS
Status: DISCONTINUED | OUTPATIENT
Start: 2022-09-10 | End: 2022-09-10 | Stop reason: HOSPADM

## 2022-09-10 RX ORDER — ARIPIPRAZOLE 5 MG/1
5 TABLET ORAL DAILY
Status: DISCONTINUED | OUTPATIENT
Start: 2022-09-10 | End: 2022-09-10

## 2022-09-10 RX ORDER — HEPARIN SODIUM 1000 [USP'U]/ML
5000 INJECTION, SOLUTION INTRAVENOUS; SUBCUTANEOUS ONCE
Status: COMPLETED | OUTPATIENT
Start: 2022-09-10 | End: 2022-09-10

## 2022-09-10 RX ORDER — ACETAMINOPHEN 325 MG/1
650 TABLET ORAL EVERY 6 HOURS PRN
Status: DISCONTINUED | OUTPATIENT
Start: 2022-09-10 | End: 2022-09-13 | Stop reason: HOSPADM

## 2022-09-10 RX ORDER — FENTANYL CITRATE 50 UG/ML
25-50 INJECTION, SOLUTION INTRAMUSCULAR; INTRAVENOUS EVERY 5 MIN PRN
Status: DISCONTINUED | OUTPATIENT
Start: 2022-09-10 | End: 2022-09-10 | Stop reason: HOSPADM

## 2022-09-10 RX ORDER — LIDOCAINE 40 MG/G
CREAM TOPICAL
Status: DISCONTINUED | OUTPATIENT
Start: 2022-09-10 | End: 2022-09-11

## 2022-09-10 RX ORDER — LIDOCAINE 40 MG/G
CREAM TOPICAL
Status: DISCONTINUED | OUTPATIENT
Start: 2022-09-10 | End: 2022-09-13 | Stop reason: HOSPADM

## 2022-09-10 RX ORDER — IOPAMIDOL 612 MG/ML
100 INJECTION, SOLUTION INTRAVASCULAR ONCE
Status: COMPLETED | OUTPATIENT
Start: 2022-09-10 | End: 2022-09-10

## 2022-09-10 RX ORDER — ARIPIPRAZOLE 2 MG/1
2 TABLET ORAL DAILY
Status: DISCONTINUED | OUTPATIENT
Start: 2022-09-11 | End: 2022-09-10

## 2022-09-10 RX ORDER — HEPARIN SODIUM 10000 [USP'U]/100ML
0-5000 INJECTION, SOLUTION INTRAVENOUS CONTINUOUS
Status: DISPENSED | OUTPATIENT
Start: 2022-09-10 | End: 2022-09-12

## 2022-09-10 RX ORDER — HEPARIN SODIUM 200 [USP'U]/100ML
6 INJECTION, SOLUTION INTRAVENOUS CONTINUOUS PRN
Status: DISCONTINUED | OUTPATIENT
Start: 2022-09-10 | End: 2022-09-10 | Stop reason: HOSPADM

## 2022-09-10 RX ADMIN — MIDAZOLAM HYDROCHLORIDE 0.5 MG: 1 INJECTION, SOLUTION INTRAMUSCULAR; INTRAVENOUS at 16:55

## 2022-09-10 RX ADMIN — ACETAMINOPHINE, CAFFEINE 2 TABLET: 500; 65 TABLET, FILM COATED ORAL at 14:35

## 2022-09-10 RX ADMIN — HEPARIN SODIUM IN SODIUM CHLORIDE 6 BAG: 200 INJECTION INTRAVENOUS at 16:41

## 2022-09-10 RX ADMIN — MIDAZOLAM HYDROCHLORIDE 1 MG: 1 INJECTION, SOLUTION INTRAMUSCULAR; INTRAVENOUS at 16:16

## 2022-09-10 RX ADMIN — HEPARIN SODIUM 5000 UNITS: 1000 INJECTION INTRAVENOUS; SUBCUTANEOUS at 16:30

## 2022-09-10 RX ADMIN — FENTANYL CITRATE 25 MCG: 50 INJECTION, SOLUTION INTRAMUSCULAR; INTRAVENOUS at 16:55

## 2022-09-10 RX ADMIN — HEPARIN SODIUM 5000 UNITS: 1000 INJECTION INTRAVENOUS; SUBCUTANEOUS at 17:23

## 2022-09-10 RX ADMIN — DESVENLAFAXINE SUCCINATE 100 MG: 100 TABLET, FILM COATED, EXTENDED RELEASE ORAL at 09:00

## 2022-09-10 RX ADMIN — LIDOCAINE HYDROCHLORIDE 10 ML: 10 INJECTION, SOLUTION INFILTRATION; PERINEURAL at 16:17

## 2022-09-10 RX ADMIN — FENTANYL CITRATE 50 MCG: 50 INJECTION, SOLUTION INTRAMUSCULAR; INTRAVENOUS at 16:16

## 2022-09-10 RX ADMIN — FENTANYL CITRATE 25 MCG: 50 INJECTION, SOLUTION INTRAMUSCULAR; INTRAVENOUS at 17:18

## 2022-09-10 RX ADMIN — MIDAZOLAM HYDROCHLORIDE 1 MG: 1 INJECTION, SOLUTION INTRAMUSCULAR; INTRAVENOUS at 17:43

## 2022-09-10 RX ADMIN — HEPARIN SODIUM 1900 UNITS/HR: 10000 INJECTION, SOLUTION INTRAVENOUS at 16:52

## 2022-09-10 RX ADMIN — ATOMOXETINE 80 MG: 40 CAPSULE ORAL at 08:59

## 2022-09-10 RX ADMIN — LISINOPRIL 10 MG: 10 TABLET ORAL at 08:59

## 2022-09-10 RX ADMIN — FENTANYL CITRATE 50 MCG: 50 INJECTION, SOLUTION INTRAMUSCULAR; INTRAVENOUS at 17:43

## 2022-09-10 RX ADMIN — IOPAMIDOL 120 ML: 612 INJECTION, SOLUTION INTRAVENOUS at 18:07

## 2022-09-10 RX ADMIN — ARIPIPRAZOLE 7 MG: 2 TABLET ORAL at 09:00

## 2022-09-10 RX ADMIN — MIDAZOLAM HYDROCHLORIDE 0.5 MG: 1 INJECTION, SOLUTION INTRAMUSCULAR; INTRAVENOUS at 17:18

## 2022-09-10 RX ADMIN — HYDROCHLOROTHIAZIDE 12.5 MG: 12.5 TABLET ORAL at 09:00

## 2022-09-10 ASSESSMENT — ACTIVITIES OF DAILY LIVING (ADL)
ADLS_ACUITY_SCORE: 35
DRESSING/BATHING_DIFFICULTY: NO
ADLS_ACUITY_SCORE: 35
CONCENTRATING,_REMEMBERING_OR_MAKING_DECISIONS_DIFFICULTY: NO
WALKING_OR_CLIMBING_STAIRS_DIFFICULTY: NO
ADLS_ACUITY_SCORE: 35
ADLS_ACUITY_SCORE: 20
CHANGE_IN_FUNCTIONAL_STATUS_SINCE_ONSET_OF_CURRENT_ILLNESS/INJURY: NO
ADLS_ACUITY_SCORE: 35
TOILETING_ISSUES: NO
WEAR_GLASSES_OR_BLIND: YES
DIFFICULTY_EATING/SWALLOWING: NO
ADLS_ACUITY_SCORE: 35
DOING_ERRANDS_INDEPENDENTLY_DIFFICULTY: NO
ADLS_ACUITY_SCORE: 35
FALL_HISTORY_WITHIN_LAST_SIX_MONTHS: NO
ADLS_ACUITY_SCORE: 20
ADLS_ACUITY_SCORE: 35

## 2022-09-10 NOTE — PHARMACY-ADMISSION MEDICATION HISTORY
Admission medication history interview status for this patient is complete. See [facility] admission navigator for allergy information, prior to admission medications and immunization status.     Medication history interview done, indicate source(s): Patient  Medication history resources (including written lists, pill bottles, clinic record):None  Ph[facility]    Changes made to PTA medication list:  Added: Abilify 5 mg (to take with 2 mg for total dose of 7 mg)  Changed: ibuprofen PO --> ibuprofen 200 mg  Reported as Not Taking: None  Removed: None    Actions taken by pharmacist (provider contacted, etc):None     Additional medication history information:None    Medication reconciliation/reorder completed by provider prior to medication history?  Y   (Y/N)       Prior to Admission medications    Medication Sig Last Dose Taking? Auth Provider Long Term End Date   ARIPiprazole (ABILIFY) 2 MG tablet Take 2 mg by mouth daily Take in addition to 5 mg tablet for total daily dose of 7 mg 9/8/2022 at am Yes Reported, Patient No    ARIPiprazole (ABILIFY) 5 MG tablet Take 5 mg by mouth daily Take in addition to 2 mg tablet for total daily dose of 7 mg 9/8/2022 at am Yes Unknown, Entered By History No    atomoxetine (STRATTERA) 80 MG capsule Take 80 mg by mouth daily 9/8/2022 at am Yes Reported, Patient     hydrochlorothiazide (HYDRODIURIL) 12.5 MG tablet TAKE 1 TABLET(12.5 MG) BY MOUTH DAILY 9/8/2022 at am Yes Bhaskar Maldonado MD Yes    ibuprofen (ADVIL/MOTRIN) 200 MG tablet Take 400-600 mg by mouth every 6 hours as needed for moderate pain prn at prn Yes Reported, Patient     lisinopril (ZESTRIL) 10 MG tablet TAKE 1 TABLET(10 MG) BY MOUTH DAILY 9/8/2022 at am Yes Bahskar Maldonado MD Yes    Multiple Vitamins-Minerals (MULTIVITAMIN ADULT PO)  9/8/2022 at am Yes Reported, Patient     PRISTIQ 100 MG TB24 24 hr tablet Take 100 mg by mouth daily 9/8/2022 at am Yes Reported, Patient Yes

## 2022-09-10 NOTE — CONSULTS
Joselito Padilla is a 51 year old male with hypertension who presents with shortness of breath. He was found to have bilateral pulmonary emboli in the setting of recent Covid-19 infection (dx and started Paxlovid 8/15/22) and then travel to [place], returning 8/28/22. CT of the chest showed extensive bilateral pulmonary emboli in the lower and segmental pulmonary arteries. Mild straightening of the interventricular septum, suggesting right heart strain. The case was discussed with IR who recommended thrombectomy. He was started on a heparin gtt and transfer to [facility] was initiated 9/8. Bed is still not available at [facility]. Echocardiogram was done today showing normal EF with reduced RV systolic function and flattened septum consistent with RV pressure overload. Hospitalist team was asked to review home meds and place basic patient care orders while the pt is boarding in the ED. His home medications were reviewed, dosing verified with pt and resumed as appropriate. Basic PRN medications added for pain, nausea and constipation. He is ok for a regular diet today. Recommend NPO in AM in case this is needed for IR procedure. Acute changes in pt's condition should still be directed to ED provider at this time.    Elsa Mclaughlin PA-C

## 2022-09-10 NOTE — IR NOTE
RADIOLOGY POST PROCEDURE NOTE    Patient name: Joselito Padilla  MRN: [id number]  : 1970    Pre-procedure diagnosis: Bilateral PEs and RLE DVT  Post-procedure diagnosis: Same    Procedure Date/Time: September 10, 2022  6:16 PM  Procedure:    Right CFV access and pursestring closure device at completion.    Pulmonary artery angiogram.  Bilateral mechanical thrombectomy.  Tolerated well.  Baseline heparin continued throughout the exam and bolus of heparin during procedure:  X2 of 5000U.    PA pressures:  PRE:  49/16 (27)  POST:  30/8 (19)    Plan:  Continue heparin drip.  Pursestring sutures to be removed on Monday.  3 hour bedrest, may advance diet from IR perspective.      Estimated blood loss: 150 ml  Specimen(s) collected with description:  None    The patient tolerated the procedure well with no immediate complications.  Significant findings:  Please see above.    See imaging dictation for procedural details.    Provider name: Tim Kyle MD  Assistant(s):None

## 2022-09-10 NOTE — PROGRESS NOTES
[id number]    Patient returned from IR with RN on care at 1830 from Pulmonary artery angiogram.  Bilateral mechanical thrombectomy. On bedrest for 3 hours (2130), then can move wit assistance. Clear liquid diet this evening then can change to Regular diet for breakfast. On Heparin drip at 1900 Hep X was 0.65 recheck at 2100 on high intensity protocol. Tele NSR. On [facility]

## 2022-09-10 NOTE — IR NOTE
Interventional Radiology Pre-Procedure Sedation Assessment   Time of Assessment: 3:55 PM    Expected Level: Moderate Sedation    Indication: Sedation is required for the following type of Procedure: Venous    Sedation and procedural consent: Risks, benefits and alternatives were discussed with Patient    PO Intake: Appropriately NPO for procedure    ASA Class: Class 3 - SEVERE SYSTEMIC DISEASE, DEFINITE FUNCTIONAL LIMITATIONS.    Mallampati: Grade 2:  Soft palate, base of uvula, tonsillar pillars, and portion of posterior pharyngeal wall visible    Lungs: Lungs Clear with good breath sounds bilaterally    Heart: Normal heart sounds and rate    History and physical reviewed and no updates needed. I have reviewed the lab findings, diagnostic data, medications, and the plan for sedation. I have determined this patient to be an appropriate candidate for the planned sedation and procedure and have reassessed the patient IMMEDIATELY PRIOR to sedation and procedure.    Tim Kyle MD

## 2022-09-10 NOTE — ED PROVIDER NOTES
Received patient in signout.  Patient waiting transfer to [facility] for consideration of IR thrombectomy given submassive PE.  Case discussed with Dr. Jurado, hospitalist, at [facility].  She has accepted the patient in transfer.     Note made of K of 5.9 just prior to transfer from am labs.  This was hemolyzed per lab.  EKG without peaked T-waves and subsequent redraw normal at 4.6.  Patient felt stable for transfer via EMS.             Tim Sinclair MD  09/10/22 1108

## 2022-09-10 NOTE — IR NOTE
Interventional Radiology Intra-procedural Nursing Note    Patient Name: Joselito Padilla  Medical Record Number: [id number]  Today's Date: September 10, 2022    Start Time: 1616  End of procedure time: 1803  Procedure: Pulmonary angiogram with Thrombectomy, Inferior Vena Cava placement and moderate sedation  Report given to: IMC RN  Time pt departs:  1814    Other Notes: Pt into IR suite 1 via cart. Pt awake and alert. To table in supine position. Monitoring equipment applied. VSS. Tele SR. Dr. Kyle in room. Time out and procedure started. Pt tolerated procedure well. Debrief with Dr. Kyle. IVC filter placed, purse string suture applied and pressure held until hemostasis achieved. Dressing CDI. No complications. Pt transferred back to [facility].    PA Pressures  Main Pre: 49/16 mean 27  Main Post: 30/8 mean 19    ACT  152 at 1622  182 at 1717    Medications:    Versed 3 mg  Fentanyl 150 mcg  Lidocaine 1% 10 ml  Heparin 10,000 units    Yo Cantu RN

## 2022-09-10 NOTE — PLAN OF CARE
Goal Outcome Evaluation:        Arrived to [address] after 1100 as EMS transfer from [facility]... A/O. Denies any SOB at rest. On RA. Denies any CP. US showing RLE with DVTs and saddle PE. IR consulted, Potential intervention. Heparin gtts. Waiting on Stat labs. Spouse at bedside. Nursing to follow closely

## 2022-09-10 NOTE — CONSULTS
IR    Patient with bilateral PEs and RLE DVT.  Multiple conversation with Dr. Costa and patient today--transferred to [place] from [facility] today.  Troponin borderline elevated, moderate heart strain with echo, SOB with walking.  Plan to proceed with pulmonary arterial thrombectomy and placement of IVC filter.    Tim Singleton MD

## 2022-09-11 LAB
ANION GAP SERPL CALCULATED.3IONS-SCNC: 6 MMOL/L (ref 3–14)
BUN SERPL-MCNC: 17 MG/DL (ref 7–30)
CALCIUM SERPL-MCNC: 8.6 MG/DL (ref 8.5–10.1)
CHLORIDE BLD-SCNC: 107 MMOL/L (ref 94–109)
CO2 SERPL-SCNC: 26 MMOL/L (ref 20–32)
CREAT SERPL-MCNC: 0.85 MG/DL (ref 0.66–1.25)
ERYTHROCYTE [DISTWIDTH] IN BLOOD BY AUTOMATED COUNT: 13.3 % (ref 10–15)
GFR SERPL CREATININE-BSD FRML MDRD: >90 ML/MIN/1.73M2
GLUCOSE BLD-MCNC: 107 MG/DL (ref 70–99)
HCT VFR BLD AUTO: 42.7 % (ref 40–53)
HGB BLD-MCNC: 13.7 G/DL (ref 13.3–17.7)
MCH RBC QN AUTO: 27.9 PG (ref 26.5–33)
MCHC RBC AUTO-ENTMCNC: 32.1 G/DL (ref 31.5–36.5)
MCV RBC AUTO: 87 FL (ref 78–100)
PLATELET # BLD AUTO: 206 10E3/UL (ref 150–450)
POTASSIUM BLD-SCNC: 3.8 MMOL/L (ref 3.4–5.3)
RBC # BLD AUTO: 4.91 10E6/UL (ref 4.4–5.9)
SODIUM SERPL-SCNC: 139 MMOL/L (ref 133–144)
UFH PPP CHRO-ACNC: 0.37 IU/ML
UFH PPP CHRO-ACNC: 0.39 IU/ML
UFH PPP CHRO-ACNC: 1.01 IU/ML
WBC # BLD AUTO: 5.3 10E3/UL (ref 4–11)

## 2022-09-11 PROCEDURE — 250N000013 HC RX MED GY IP 250 OP 250 PS 637: Performed by: INTERNAL MEDICINE

## 2022-09-11 PROCEDURE — 85520 HEPARIN ASSAY: CPT | Performed by: INTERNAL MEDICINE

## 2022-09-11 PROCEDURE — 36415 COLL VENOUS BLD VENIPUNCTURE: CPT | Performed by: INTERNAL MEDICINE

## 2022-09-11 PROCEDURE — 99232 SBSQ HOSP IP/OBS MODERATE 35: CPT | Performed by: INTERNAL MEDICINE

## 2022-09-11 PROCEDURE — 82310 ASSAY OF CALCIUM: CPT | Performed by: INTERNAL MEDICINE

## 2022-09-11 PROCEDURE — 85027 COMPLETE CBC AUTOMATED: CPT | Performed by: INTERNAL MEDICINE

## 2022-09-11 PROCEDURE — 250N000011 HC RX IP 250 OP 636: Performed by: INTERNAL MEDICINE

## 2022-09-11 PROCEDURE — 120N000001 HC R&B MED SURG/OB

## 2022-09-11 RX ORDER — ATOMOXETINE 40 MG/1
80 CAPSULE ORAL DAILY
Status: DISCONTINUED | OUTPATIENT
Start: 2022-09-11 | End: 2022-09-13 | Stop reason: HOSPADM

## 2022-09-11 RX ORDER — DESVENLAFAXINE 50 MG/1
100 TABLET, FILM COATED, EXTENDED RELEASE ORAL DAILY
Status: DISCONTINUED | OUTPATIENT
Start: 2022-09-11 | End: 2022-09-13 | Stop reason: HOSPADM

## 2022-09-11 RX ADMIN — DESVENLAFAXINE SUCCINATE 100 MG: 50 TABLET, EXTENDED RELEASE ORAL at 11:58

## 2022-09-11 RX ADMIN — ATOMOXETINE 80 MG: 40 CAPSULE ORAL at 11:58

## 2022-09-11 RX ADMIN — ARIPIPRAZOLE 7 MG: 5 TABLET ORAL at 10:01

## 2022-09-11 RX ADMIN — HEPARIN SODIUM 1400 UNITS/HR: 10000 INJECTION, SOLUTION INTRAVENOUS at 11:02

## 2022-09-11 ASSESSMENT — ACTIVITIES OF DAILY LIVING (ADL)
ADLS_ACUITY_SCORE: 20

## 2022-09-11 NOTE — PLAN OF CARE
Goal Outcome Evaluation:    Plan of Care Reviewed With: patient     Overall Patient Progress: improving    Outcome Evaluation: Heparin drip continues    Pt. Alert and oriented x4. Vital signs stable on 2L NC and CPAP at rest. Assist of 1 SBA - was besd rest until 2130, moving well, no complaints of SOB or breathing discomfort. Tolerating clear liquid AAT diet - no intake this shift. Lung sounds clear. Bowel sounds +, passing flatus. BM (-), adequate urine output. Skin R. Groin site CDI, soft, no bruising/hematoma notes. Pain: Denies. Denies nausea. Tele NSR

## 2022-09-11 NOTE — PROGRESS NOTES
Pt is currently on CPAP 5, 21%. Skin barrier in place. No skin issues.       Demetra Oliver RT assistant

## 2022-09-11 NOTE — PLAN OF CARE
IMC discontinued. Alert and oriented x4. Vital signs stable on 1LPM NC. Up SBA. Tolerating reg diet. Lung sounds CL. Bowel sounds +, + flatus, BM Thursday per patient report, AUO. Skin WNL except R groin post thrombectomy CDI nontender, no bruising/hematoma. Pain denied. Denies nausea. Tele NSR. Heparin drip running at 1,400 Units/hr- Next lab recheck @2100. Continue to monitor.

## 2022-09-11 NOTE — PROGRESS NOTES
[facility]    Medicine Progress Note - Hospitalist Service    Date of Admission:  9/10/2022    Assessment & Plan        Joselito Padilla is a 51 year old male transferred from [facility] for possible thrombectomy.  Recent COVID-19 mid August, recovered and traveled on airplane.  Returned and was feeling well until a couple days before presentation to OSH with dyspnea/chest tightness.  Found to have extensive PE on CT and right sided heart changes.  He was placed on IV Heparin.  He was setup for transfer for thrombectomy.  As no beds were available he remained in the [facility] ED for another day.  He transferred to [facility] 9/10.    Extensive pulmonary emboli likely related to recent COVID-19 infection:    IR consult requested, I appreciate Dr. Kyle's expert care    STAT venous LE ultrasound showed deep venous thrombosis extending from the distal right femoral vein to the popliteal and posterior tibialis veins    Echo shows some right heart strain/moderate pressure.  Normal LVEF.      Patient is S/P pulmonary artery angiogram.  Bilateral mechanical thrombectomy, IVC filter placed 9/10    Continue heparin drip, pharmacy consulted.      Consider transition to DOAC tomorrow. Pharmacy liaison consult requested.for Monday.  They are unavailable on the weekend.    it may be reasonable to pursue basic hypercoag workup outpatient once further out from acute PE     Recent COVID-19:    Recovered status, now about a month out (original diagnosis 8/12).  No indication for special precautions  HTN    Hold lisinopril and hydrochlorothiazide for now    Depression:    Continue abilify     DVT Prophylaxis: Heparin drip  Code Status: Full Code  Chaudhary Catheter: Not present  Central Lines: None  Cardiac Monitoring: ACTIVE order. Indication: Pulmonaryemboli    Disposition: Possibly ready for discharge as soon as tomorrow     Diet: Regular Diet Adult    DVT Prophylaxis: heparin gtt  Chaudhary Catheter: Not present  Central  "Lines: None  Cardiac Monitoring: ACTIVE order. Indication: Pulmonaryemboli  Code Status: Full Code      Disposition Plan     Expected Discharge Date: 09/12/2022           Pending pursestring sutures removed, transitioned to DOAC, off supplemental oxygen     The patient's care was discussed with the Bedside Nurse and Patient.    Mariam Fleming MD  Hospitalist Service  [facility]  Securely message with the Vocera Web Console (learn more here)  Text page via [employer] Paging/Directory       Clinically Significant Risk Factors Present on Admission                 # Hypertension: home medication list includes antihypertensive(s)          ______________________________________________________________________    Interval History   \"I'm really fine.\"  Patient denies feeling short of breath.  He says that even with pulmonary emboli He was mostly short of breath when he walked up steps.  He denies chest pain, no GI complaints.    Data reviewed today: I reviewed all medications, new labs and imaging results over the last 24 hours. I personally reviewed the Lower extremity venous ultrasound image(s) showing deep venous thrombosis extending from the distal right femoral vein to the popliteal and posterior tibialis veins.    Physical Exam   Vital Signs: Temp: 97.8  F (36.6  C) Temp src: Axillary BP: 128/89 Pulse: 80   Resp: 20 SpO2: 96 % O2 Device: BiPAP/CPAP Oxygen Delivery: 2 LPM  Weight: 244 lbs 8 oz  Constitutional: Awake, alert, cooperative, no apparent distress  Respiratory: Clear to auscultation bilaterally, no crackles or wheezing  Cardiovascular: Regular rate and rhythm, normal S1 and S2, and no murmur noted  GI: Normal bowel sounds, soft, non-distended, non-tender  Skin/Integumen: No rashes, no cyanosis, no edema  Other: Mood is very pleasant      Data   Recent Labs   Lab 09/11/22  0447 09/10/22  1447 09/10/22  1023 09/10/22  0844 09/08/22  1626 09/08/22  1121   WBC 5.3 6.8  --  7.7  --  6.3 "   HGB 13.7 14.8  --  17.3  --  15.3   MCV 87 87  --  88  --  86    205  --  219  --  202    140  --  139   < > 136   POTASSIUM 3.8 3.8 4.6 5.9*   < > 3.8   CHLORIDE 107 107  --  105   < > 102   CO2 26 29  --  22   < > 29   BUN 17 16  --  14.3   < > 15   CR 0.85 0.85  --  0.80   < > 1.08   ANIONGAP 6 4  --  12   < > 5   TIMI 8.6 9.1  --  9.6   < > 9.9   * 105*  --  112*   < > 103*   ALBUMIN  --  3.5  --   --   --  4.0   PROTTOTAL  --  7.2  --   --   --  7.8   BILITOTAL  --  0.4  --   --   --  0.4   ALKPHOS  --  68  --   --   --  75   ALT  --  37  --   --   --  42   AST  --  18  --   --   --  23    < > = values in this interval not displayed.     Recent Results (from the past 24 hour(s))   US Lower Extremity Venous Duplex Bilateral    Narrative    EXAM: ULTRASOUND LOWER EXTREMITY VENOUS DUPLEX BILATERAL  LOCATION: [facility]  DATE/TIME: 09/10/2022, 1:20 PM    INDICATION: Pulmonary emboli.  COMPARISON: None.  TECHNIQUE: Venous Duplex ultrasound of bilateral lower extremities with and without compression, augmentation and duplex. Color flow and spectral Doppler with waveform analysis performed.    FINDINGS: Exam includes the common femoral, femoral, popliteal veins as well as segmentally visualized deep calf veins and greater saphenous vein.     RIGHT: Positive deep venous thrombosis at the right leg extending from the distal femoral vein to the popliteal vein and posterior tibialis veins. The posterior tibialis vein deep venous thrombosis proximally appears occlusive. Other levels are nearly   occlusive. Peroneal vein cannot be visualized for assessment. No superficial thrombophlebitis. No popliteal cyst.    LEFT: No deep vein thrombosis. No superficial thrombophlebitis. No popliteal cyst.      Impression    IMPRESSION:  1.  Right leg demonstrates positive deep venous thrombosis extending from the distal right femoral vein to the popliteal and posterior tibialis veins. At  the proximal right posterior tibialis vein, this is occlusive and other levels are nearly occlusive.  2.  No left leg deep venous thrombosis identified.       Medications     heparin 1,400 Units/hr (09/11/22 8721)     - MEDICATION INSTRUCTIONS -         ARIPiprazole  7 mg Oral Daily     sodium chloride (PF)  3 mL Intracatheter Q8H     sodium chloride (PF)  3 mL Intracatheter Q8H

## 2022-09-12 DIAGNOSIS — I26.99 PULMONARY EMBOLI (H): Primary | ICD-10-CM

## 2022-09-12 LAB
ATRIAL RATE - MUSE: 88 BPM
DIASTOLIC BLOOD PRESSURE - MUSE: NORMAL MMHG
ERYTHROCYTE [DISTWIDTH] IN BLOOD BY AUTOMATED COUNT: 13.2 % (ref 10–15)
HCT VFR BLD AUTO: 40.5 % (ref 40–53)
HGB BLD-MCNC: 13.1 G/DL (ref 13.3–17.7)
INTERPRETATION ECG - MUSE: NORMAL
MCH RBC QN AUTO: 28 PG (ref 26.5–33)
MCHC RBC AUTO-ENTMCNC: 32.3 G/DL (ref 31.5–36.5)
MCV RBC AUTO: 87 FL (ref 78–100)
P AXIS - MUSE: 44 DEGREES
PLATELET # BLD AUTO: 204 10E3/UL (ref 150–450)
PR INTERVAL - MUSE: 156 MS
QRS DURATION - MUSE: 80 MS
QT - MUSE: 348 MS
QTC - MUSE: 421 MS
R AXIS - MUSE: -1 DEGREES
RBC # BLD AUTO: 4.68 10E6/UL (ref 4.4–5.9)
SYSTOLIC BLOOD PRESSURE - MUSE: NORMAL MMHG
T AXIS - MUSE: 24 DEGREES
UFH PPP CHRO-ACNC: 0.29 IU/ML
UFH PPP CHRO-ACNC: 0.69 IU/ML
VENTRICULAR RATE- MUSE: 88 BPM
WBC # BLD AUTO: 4.4 10E3/UL (ref 4–11)

## 2022-09-12 PROCEDURE — 36415 COLL VENOUS BLD VENIPUNCTURE: CPT | Performed by: INTERNAL MEDICINE

## 2022-09-12 PROCEDURE — 120N000001 HC R&B MED SURG/OB

## 2022-09-12 PROCEDURE — 250N000013 HC RX MED GY IP 250 OP 250 PS 637: Performed by: INTERNAL MEDICINE

## 2022-09-12 PROCEDURE — 85027 COMPLETE CBC AUTOMATED: CPT | Performed by: INTERNAL MEDICINE

## 2022-09-12 PROCEDURE — 250N000011 HC RX IP 250 OP 636: Performed by: INTERNAL MEDICINE

## 2022-09-12 PROCEDURE — 85520 HEPARIN ASSAY: CPT | Performed by: INTERNAL MEDICINE

## 2022-09-12 PROCEDURE — 99233 SBSQ HOSP IP/OBS HIGH 50: CPT | Performed by: INTERNAL MEDICINE

## 2022-09-12 RX ADMIN — ATOMOXETINE 80 MG: 40 CAPSULE ORAL at 08:33

## 2022-09-12 RX ADMIN — ARIPIPRAZOLE 7 MG: 5 TABLET ORAL at 08:33

## 2022-09-12 RX ADMIN — RIVAROXABAN 15 MG: 15 TABLET, FILM COATED ORAL at 18:24

## 2022-09-12 RX ADMIN — DESVENLAFAXINE SUCCINATE 100 MG: 50 TABLET, EXTENDED RELEASE ORAL at 08:32

## 2022-09-12 RX ADMIN — HEPARIN SODIUM 1400 UNITS/HR: 10000 INJECTION, SOLUTION INTRAVENOUS at 05:44

## 2022-09-12 ASSESSMENT — ACTIVITIES OF DAILY LIVING (ADL)
ADLS_ACUITY_SCORE: 20

## 2022-09-12 NOTE — PLAN OF CARE
Goal Outcome Evaluation:    Plan of Care Reviewed With: patient     Overall Patient Progress: improving    Outcome Evaluation: Heparin dripp continues at 1400 units/hr    Pt. Alert and oriented x4. Vital signs stable on 1-2L NC - declined CPAP use this evening. Assist of 1 SBA. Tolerating regular diet. Lung sounds clear/dim. Bowel sounds +, adequate urine output. Skin: R. Groin site - soft, non-tender, no bruising/hematoma noted, CDI. Pain: Denies - has some soreness to the groin site with bending/sitting down. Denies nausea. Tele NSR.

## 2022-09-12 NOTE — CONSULTS
Patient has BCBS through an employer.    Xarelto/Eliquis:  $64/mo. Upon receipt of RX Discharge Pharmacy can provide copay savings card from [employer] or [email or web address], respectively, to reduce this to $10/mo.    Enoxaparin 120mg x 10 syringes: $23.    Jantoven (warfarin): $1/mo.      -SAQIB Joe, Pharmacy Technician/Liaison, Discharge Pharmacy [phone]

## 2022-09-12 NOTE — PROGRESS NOTES
"  Interventional Radiology - Progress Note  Inpatient - [facility]  9/12/2022    S:  No pain at Rt CFV access site. No CP or SOB at rest on O2 via NC. Has not been ambulating.      O:  BP (!) 145/96 (BP Location: Right arm, Patient Position: Semi-Cordova's)   Pulse 85   Temp 97.5  F (36.4  C) (Oral)   Resp 18   Wt 110.9 kg (244 lb 8 oz)   SpO2 94%   BMI 36.11 kg/m    General:  Stable.  In no acute distress.    Neuro:  A&O x 3. Moves all extremities equally.  Resp:  Lungs clear to auscultation bilaterally on O2 via NC  Cardio:  RRR  Vasc: Rt CFV site dressing c/d/i, no swelling or tenderness    Imaging:  Pulmonary angiogram 9/10/22:  \"FINDINGS: A total of 2 venograms performed in the inferior vena cava  with placement of IVC filter at completion with good result and no  thrombus. Total of 5 pulmonary angiogram pulmonary sequences obtained  throughout the procedure. Angiogram from the main pulmonary artery  demonstrates mild to moderate scattered thrombus in both centralized  pulmonary systems. Mechanical thrombectomy then performed in the right  lower lobe and right upper lobe with good result at completion. Left  pulmonary arterial angiogram demonstrates moderate amount of thrombus  throughout the left lower lobe pulmonary artery. Good result at  completion with removal of the centralized thrombus. There remains  scattered peripheral thrombus, overall result is successful.                                                                      IMPRESSION:  1. Successful bilateral pulmonary arterial mechanical thrombectomy.  Heparin drip is to be continued. Pursestring suture applied at the  right groin venous access site, to be removed in 2 days.  2. Successful deployment of Bard Dionne retrievable infrarenal IVC  filter. Plan will be for removal in 2 months\"    Labs (Last four results)  CMPRecent Labs   Lab 09/11/22  0447 09/10/22  1447 09/10/22  1023 09/10/22  0844 09/08/22  1626   POTASSIUM 3.8 3.8 4.6 " 5.9* 4.0   * 105*  --  112* 88   BUN 17 16  --  14.3 12.8   CR 0.85 0.85  --  0.80 1.10   GFRESTIMATED >90 >90  --  >90 81     CBC  Recent Labs   Lab 09/12/22  0529 09/11/22  0447 09/10/22  1447 09/10/22  0844   WBC 4.4 5.3 6.8 7.7   RBC 4.68 4.91 5.27 6.19*   HGB 13.1* 13.7 14.8 17.3   HCT 40.5 42.7 45.6 54.3*    206 205 219     INRNo lab results found in last 7 days.    A:  RLE DVT  PE s/p pulmonary thrombectomy and IVC filter placement 9/10/22    P:    Rt CFV pursestring suture removed today  OK to remove dressing in 72 hours  Will follow up as outpatient in one month with repeat RLE venous US  Likely IVC filter removal in 2 months  Starting DOAC today?  Thank you for allowing us to participate in this patient's care. IR will no longer follow. Please contact our service with any questions, concerns, or requests for further intervention.    Laith Andre PA-C  Interventional Radiology  *[phone]      Total time spent on the date of the encounter is 20 minutes, including time spent counseling the patient, performing a medically appropriate evaluation, reviewing prior medical history, ordering medications and tests, documenting clinical information in the medical record, and communication of results.

## 2022-09-12 NOTE — PROGRESS NOTES
"[facility]    Medicine Progress Note - Hospitalist Service    Date of Admission:  9/10/2022    Assessment & Plan        Edmarcia Padilla is a 51 year old male transferred from [facility] for possible thrombectomy.  Recent COVID-19 mid August, recovered and traveled on airplane.  Returned and was feeling well until a couple days before presentation to OSH with dyspnea/chest tightness.  Found to have extensive PE on CT and right sided heart changes.  He was placed on IV Heparin.  He was setup for transfer for thrombectomy.  As no beds were available he remained in the [facility] ED for another day.  He transferred to [facility] 9/10.     Extensive pulmonary emboli likely related to recent COVID-19 infection:  ? IR consult appreciated: Patient is S/P pulmonary artery angiogram.  Bilateral mechanical thrombectomy, IVC filter placed 9/10  ? Continue heparin drip, pharmacy consulted.    ? Consider transition to DOAC tomorrow.   ? it may be reasonable to pursue basic hypercoag workup outpatient once further out from acute PE      Recent COVID-19:  ? Recovered status, now about a month out (original diagnosis 8/12).  No indication for special precautions  HTN  ? Hold lisinopril and hydrochlorothiazide for now     Depression:  ? Continue abilify     Diet: Regular Diet Adult    DVT Prophylaxis: heparin gtt   Chaudhary Catheter: Not present  Central Lines: None  Cardiac Monitoring: ACTIVE order. Indication: pulmonary embolism  Code Status: Full Code      Disposition Plan      Expected Discharge Date: 09/13/2022                The patient's care was discussed with the Patient.    Kingston Viera MD, MD  Hospitalist Service  [facility]  Securely message with the Vocera Web Console (learn more here)  Text page via [employer] Paging/Directory     \"This dictation was performed with voice recognition software and may contain errors,  omissions and inadvertent word substitution.\"     Clinically Significant " Risk Factors Present on Admission                   ______________________________________________________________________    Interval History   Feeling better.  Denies chest pain/palpitations.  Continues to require O2.   4 point ROS done and negative unless mentioned    Data reviewed today: I reviewed all medications, new labs and imaging results over the last 24 hours. I personally reviewed no images or EKG's today.    Physical Exam   BP (!) 145/96 (BP Location: Right arm, Patient Position: Semi-Cordova's)   Pulse 85   Temp 97.5  F (36.4  C) (Oral)   Resp 18   Wt 110.9 kg (244 lb 8 oz)   SpO2 94%   BMI 36.11 kg/m    Gen- pleasant  lying in bed  HEENT- NAD, QI  Neck- supple, no JVD elevation, no thyromegaly  CVS- I+II+ no m/r/g  RS- CTAB, decreased at the base   Abdo- soft, no tenderness . No g/r/r  Ext- edema     Data    BMPRecent Labs   Lab 09/11/22  0447 09/10/22  1447 09/10/22  1023 09/10/22  0844 09/08/22  1626    140  --  139 138   POTASSIUM 3.8 3.8 4.6 5.9* 4.0   CHLORIDE 107 107  --  105 100   TIMI 8.6 9.1  --  9.6 9.5   CO2 26 29  --  22 25   BUN 17 16  --  14.3 12.8   CR 0.85 0.85  --  0.80 1.10   * 105*  --  112* 88     CBC  Recent Labs   Lab 09/12/22  0529 09/11/22  0447 09/10/22  1447 09/10/22  0844   WBC 4.4 5.3 6.8 7.7   RBC 4.68 4.91 5.27 6.19*   HGB 13.1* 13.7 14.8 17.3   HCT 40.5 42.7 45.6 54.3*   MCV 87 87 87 88   MCH 28.0 27.9 28.1 27.9   MCHC 32.3 32.1 32.5 31.9   RDW 13.2 13.3 13.2 13.4    206 205 219     INRNo lab results found in last 7 days.  LFTs  Recent Labs   Lab 09/10/22  1447 09/08/22  1121   ALKPHOS 68 75   AST 18 23   ALT 37 42   BILITOTAL 0.4 0.4   PROTTOTAL 7.2 7.8   ALBUMIN 3.5 4.0

## 2022-09-13 ENCOUNTER — TELEPHONE (OUTPATIENT)
Dept: OTHER | Facility: CLINIC | Age: 52
End: 2022-09-13

## 2022-09-13 VITALS
BODY MASS INDEX: 36.11 KG/M2 | OXYGEN SATURATION: 94 % | RESPIRATION RATE: 18 BRPM | WEIGHT: 244.5 LBS | DIASTOLIC BLOOD PRESSURE: 90 MMHG | TEMPERATURE: 97.6 F | SYSTOLIC BLOOD PRESSURE: 144 MMHG | HEART RATE: 85 BPM

## 2022-09-13 LAB
ERYTHROCYTE [DISTWIDTH] IN BLOOD BY AUTOMATED COUNT: 13.2 % (ref 10–15)
HCT VFR BLD AUTO: 39.3 % (ref 40–53)
HGB BLD-MCNC: 12.9 G/DL (ref 13.3–17.7)
MCH RBC QN AUTO: 28.2 PG (ref 26.5–33)
MCHC RBC AUTO-ENTMCNC: 32.8 G/DL (ref 31.5–36.5)
MCV RBC AUTO: 86 FL (ref 78–100)
PLATELET # BLD AUTO: 219 10E3/UL (ref 150–450)
RBC # BLD AUTO: 4.57 10E6/UL (ref 4.4–5.9)
WBC # BLD AUTO: 4.7 10E3/UL (ref 4–11)

## 2022-09-13 PROCEDURE — 250N000013 HC RX MED GY IP 250 OP 250 PS 637: Performed by: INTERNAL MEDICINE

## 2022-09-13 PROCEDURE — 85027 COMPLETE CBC AUTOMATED: CPT | Performed by: INTERNAL MEDICINE

## 2022-09-13 PROCEDURE — 36415 COLL VENOUS BLD VENIPUNCTURE: CPT | Performed by: INTERNAL MEDICINE

## 2022-09-13 PROCEDURE — 99239 HOSP IP/OBS DSCHRG MGMT >30: CPT | Performed by: INTERNAL MEDICINE

## 2022-09-13 RX ADMIN — RIVAROXABAN 15 MG: 15 TABLET, FILM COATED ORAL at 08:47

## 2022-09-13 RX ADMIN — DESVENLAFAXINE SUCCINATE 100 MG: 50 TABLET, EXTENDED RELEASE ORAL at 08:47

## 2022-09-13 RX ADMIN — ARIPIPRAZOLE 7 MG: 5 TABLET ORAL at 08:47

## 2022-09-13 RX ADMIN — ATOMOXETINE 80 MG: 40 CAPSULE ORAL at 08:48

## 2022-09-13 ASSESSMENT — ACTIVITIES OF DAILY LIVING (ADL)
ADLS_ACUITY_SCORE: 20

## 2022-09-13 NOTE — DISCHARGE SUMMARY
[facility]  Hospitalist Discharge Summary      Date of Admission:  9/10/2022  Date of Discharge:  9/13/2022  Discharging Provider: Kingston Viera MD, MD  Discharge Service: Hospitalist Service    Discharge Diagnoses        Extensive pulmonary emboli likely related to recent COVID-19 infection:    Recent COVID-19:    HTN   Depression:  Follow-ups Needed After Discharge   Follow-up Appointments     Follow-up and recommended labs and tests       Follow up with primary care provider, Bhaskar Maldonado, within 7 days for   hospital follow- up.  The following labs/tests are recommended:  BMP.    Follow up with interventional radiology within 1 month.  with repeat RLE   venous US  OK to remove dressing in 72 hours  Likely IVC filter removal in 2 months.   Follow up with vascular medicine           Unresulted Labs Ordered in the Past 30 Days of this Admission     No orders found from 8/11/2022 to 9/11/2022.        Discharge Disposition   Discharged to home  Condition at discharge: Stable    Hospital Course   Joselito Padilla is a 51 year old male transferred from [facility] for possible thrombectomy.  Recent COVID-19 mid August, recovered and traveled on airplane.  Returned and was feeling well until a couple days before presentation to OSH with dyspnea/chest tightness.  Found to have extensive PE on CT and right sided heart changes.  He was placed on IV Heparin.  He was setup for transfer for thrombectomy.  As no beds were available he remained in the [facility] ED for another day.  He transferred to [facility] 9/10.     Extensive pulmonary emboli likely related to recent COVID-19 infection:  ? IR consulted and patient underwent pulmonary artery angiogram.  Bilateral mechanical thrombectomy, IVC filter placed 9/10.  Postprocedure patient was maintained on heparin gtt and transitioned to DOAC  .  He was advised to follow-up with PMD, vascular medicine and interventional radiology as outpatient    Recent  COVID-19:  ? Recovered status, now about a month out (original diagnosis 8/12).  No indication for special precautions  HTN  ? Resumed lisinopril and hydrochlorothiazide on discharge     Depression:  ? Continued abilify    Consultations This Hospital Stay   PHARMACY IP CONSULT  PHARMACY IP CONSULT  PHARMACY IP CONSULT  PHARMACY LIAISON FOR MEDICATION COVERAGE CONSULT  INTERVENTIONAL RADIOLOGY ADULT/PEDS IP CONSULT  PHARMACY LIAISON FOR MEDICATION COVERAGE CONSULT  PHARMACY LIAISON FOR MEDICATION COVERAGE CONSULT  PHARMACY LIAISON FOR MEDICATION COVERAGE CONSULT  PHARMACY IP CONSULT    Code Status   Full Code    Time Spent on this Encounter   I, Kingston Viera MD, MD, personally saw the patient today and spent greater than 30 minutes discharging this patient.       Kingston Viera MD, MD  [facility]  [address] [place] [address]  Phone: [phone]  ______________________________________________________________________    Physical Exam   Vital Signs: Temp: 97.6  F (36.4  C) Temp src: Oral BP: (!) 144/90 Pulse: 85   Resp: 18 SpO2: 94 % O2 Device: None (Room air) Oxygen Delivery: 2 LPM  Weight: 244 lbs 8 oz  Gen- pleasant  lying in bed  HEENT- NAD, QI  Neck- supple, no JVD elevation, no thyromegaly  CVS- I+II+ no m/r/g  RS- CTAB, decreased at the base   Abdo- soft, no tenderness . No g/r/r  Ext- edema      Primary Care Physician   Bhaskar Maldonado    Discharge Orders      Vascular Medicine Referral      Reason for your hospital stay    Joselito Padilla is a 51 year old male transferred from [facility] for possible thrombectomy.  Recent COVID-19 mid August, recovered and traveled on airplane.  Returned and was feeling well until a couple days before presentation to OSH with dyspnea/chest tightness.  Found to have extensive PE on CT and right sided heart changes.  He was placed on IV Heparin.  He was setup for transfer for thrombectomy.  As no beds were available he remained in the  [facility] ED for another day.  He was transferred to [facility] 9/10.     Extensive pulmonary emboli likely related to recent COVID-19 infection:    IR consult appreciated: Patient is S/P pulmonary artery angiogram.  Bilateral mechanical thrombectomy, IVC filter placed 9/10.  He remained stable postprocedure and his IV heparin was transitioned to oral Xarelto.     Follow-up and recommended labs and tests     Follow up with primary care provider, Bhaskar Maldonado, within 7 days for hospital follow- up.  The following labs/tests are recommended:  BMP.    Follow up with interventional radiology within 1 month.  with repeat RLE venous US  OK to remove dressing in 72 hours  Likely IVC filter removal in 2 months.   Follow up with vascular medicine     Activity    Your activity upon discharge: activity as tolerated     Diet    Follow this diet upon discharge: Orders Placed This Encounter      Regular Diet Adult       Significant Results and Procedures   Results for orders placed or performed during the hospital encounter of 09/10/22   US Lower Extremity Venous Duplex Bilateral    Narrative    EXAM: ULTRASOUND LOWER EXTREMITY VENOUS DUPLEX BILATERAL  LOCATION: [facility]  DATE/TIME: 09/10/2022, 1:20 PM    INDICATION: Pulmonary emboli.  COMPARISON: None.  TECHNIQUE: Venous Duplex ultrasound of bilateral lower extremities with and without compression, augmentation and duplex. Color flow and spectral Doppler with waveform analysis performed.    FINDINGS: Exam includes the common femoral, femoral, popliteal veins as well as segmentally visualized deep calf veins and greater saphenous vein.     RIGHT: Positive deep venous thrombosis at the right leg extending from the distal femoral vein to the popliteal vein and posterior tibialis veins. The posterior tibialis vein deep venous thrombosis proximally appears occlusive. Other levels are nearly   occlusive. Peroneal vein cannot be visualized for assessment. No superficial  thrombophlebitis. No popliteal cyst.    LEFT: No deep vein thrombosis. No superficial thrombophlebitis. No popliteal cyst.      Impression    IMPRESSION:  1.  Right leg demonstrates positive deep venous thrombosis extending from the distal right femoral vein to the popliteal and posterior tibialis veins. At the proximal right posterior tibialis vein, this is occlusive and other levels are nearly occlusive.  2.  No left leg deep venous thrombosis identified.     IR Pulmonary Angiogram Bilateral    Narrative    INTERVENTIONAL RADIOLOGY PULMONARY ANGIOGRAM BILATERAL, INTERVENTIONAL  RADIOLOGY INFERIOR VENA CAVA FILTER PLACEMENT 9/10/2022 6:06 PM    HISTORY: 51-year-old patient with bilateral pulmonary emboli. Patient  presented to [facility], though there was lack of beds available  at [facility], therefore patient's transfer to [facility] was delayed. Pulmonary thrombectomy is being performed 48  hours after the initial CT exam. Patient presented after airplane trip  to [place]. Patient has shortness of breath with exertion. Troponin is  borderline elevated at 75 with moderate right heart strain and  echocardiogram. Patient was exercised today walking through the  hospital hallways and found to be short of breath with associated  tachycardia. Patient has right lower extremity DVT extending from the  distal femoral vein and distally. Therefore, there was much discussion  with Dr. Corado of the hospitalist service and the patient himself,  plan is to proceed with pulmonary angiogram, thrombectomy, and IVC  filter placement.    TECHNIQUE: Patient was brought to interventional radiology department  and informed consent obtained. Patient was placed in a supine  position. Skin overlying the right groin was prepped and draped in  standard sterile fashion. 1% lidocaine was used for local anesthesia.  Ultrasound confirmed patency of the right common femoral vein, and  image stored for documentation. With  continuous ultrasound guidance,  micropuncture kit was used to access the right common femoral vein.  Over a series of maneuvers, 6 Ecuadorean vascular sheath was placed. A 45  degree angle pigtail catheter was advanced through the right heart and  into the main pulmonary artery. Pulmonary arterial pressures were  obtained and found to be 49/16 with mean arterial pressure of 27.  Pulmonary angiogram was performed. The Bentson wire was then placed  and pigtail catheter was exchanged for a Berenstein catheter which was  used to navigate into the right lower lung pulmonary arterial system.  Bentson wire was exchanged for a superstiff Amplatz wire. A 24 Ecuadorean  Inari sheath was then placed into the IVC. ACT was drawn and found to  be approximately 150, therefore 5000 units of heparin was  administered. Subsequently, ACT was found to be 185 and additional  5000 units was administered. Of note, heparin drip was continued  throughout the procedure.    A FlowTriever was then advanced over the superstiff Amplatz wire and  mechanical thrombectomy performed in right lower lobe pulmonary  arterial systems. A Berenstein catheter was then used to navigate into  the upper lobe pulmonary artery where mechanical thrombectomy  performed at this location, as well. Completion pulmonary angiogram  demonstrates good result. Completion angiogram demonstrates thrombus  in the mid lower pulmonary artery laterally. Thrombectomy was again  performed at this location with some thrombus removed, though no  completion imaging performed in an effort to save contrast. The  FlowTriever and superstiff Amplatz wire were then advanced into the  left pulmonary arterial system and a wire was directed into the lower  lobe pulmonary artery where mechanical thrombectomy was performed with  a curved FlowTriever with good results and then removal of thrombus  throughout the central left lower lobe pulmonary arterial system.  Thrombus noted in scattered distal  branches, though overall, procedure  thought to be successful in debulking any central thrombus.  Postprocedure pressures were then performed and found to be 30/8 with  mean arterial pressure of 19.    FlowTriever was then removed and 24 Faroese sheath pulled to the right  common iliac vein. Venogram was performed in the inferior vena cava  and a Bard [place] retrievable IVC filter was deployed in an infrarenal  location. Completion venogram confirms well positioned filter. Sheath  was then removed and a pursestring suture applied in the right groin  after sheath removal. Hemostasis then achieved with manual compression  of the right common femoral vein.    SEDATION: A moderate level of sedation was achieved with 3 mg IV  Versed and 150 mcg IV fentanyl. 10,000 units of heparin administered  throughout the procedure.  Sedation time: 107 minutes    Please note the above medications were administered by interventional  radiology staff under my direct supervision.    The patient's vital signs were monitored and remained stable  throughout the procedure.    Fluoroscopic time: 20.2 minutes  Air Kerma: 355.85 mGy  Contrast: 120 mL of Isovue 300 administered to the pulmonary arterial  system and into the venous system.  Local anesthetic: 10 mL of 1% lidocaine administered without  complication.    FINDINGS: A total of 2 venograms performed in the inferior vena cava  with placement of IVC filter at completion with good result and no  thrombus. Total of 5 pulmonary angiogram pulmonary sequences obtained  throughout the procedure. Angiogram from the main pulmonary artery  demonstrates mild to moderate scattered thrombus in both centralized  pulmonary systems. Mechanical thrombectomy then performed in the right  lower lobe and right upper lobe with good result at completion. Left  pulmonary arterial angiogram demonstrates moderate amount of thrombus  throughout the left lower lobe pulmonary artery. Good result at  completion with  removal of the centralized thrombus. There remains  scattered peripheral thrombus, overall result is successful.      Impression    IMPRESSION:  1. Successful bilateral pulmonary arterial mechanical thrombectomy.  Heparin drip is to be continued. Pursestring suture applied at the  right groin venous access site, to be removed in 2 days.  2. Successful deployment of Bard Dionne retrievable infrarenal IVC  filter. Plan will be for removal in 2 months.    MARICRUZ COREY MD         SYSTEM ID:  [id number]   IR IVC Filter Placement    Narrative    INTERVENTIONAL RADIOLOGY PULMONARY ANGIOGRAM BILATERAL, INTERVENTIONAL  RADIOLOGY INFERIOR VENA CAVA FILTER PLACEMENT 9/10/2022 6:06 PM    HISTORY: 51-year-old patient with bilateral pulmonary emboli. Patient  presented to [facility], though there was lack of beds available  at [facility], therefore patient's transfer to [facility] was delayed. Pulmonary thrombectomy is being performed 48  hours after the initial CT exam. Patient presented after airplane trip  to [place]. Patient has shortness of breath with exertion. Troponin is  borderline elevated at 75 with moderate right heart strain and  echocardiogram. Patient was exercised today walking through the  hospital hallways and found to be short of breath with associated  tachycardia. Patient has right lower extremity DVT extending from the  distal femoral vein and distally. Therefore, there was much discussion  with Dr. Corado of the hospitalist service and the patient himself,  plan is to proceed with pulmonary angiogram, thrombectomy, and IVC  filter placement.    TECHNIQUE: Patient was brought to interventional radiology department  and informed consent obtained. Patient was placed in a supine  position. Skin overlying the right groin was prepped and draped in  standard sterile fashion. 1% lidocaine was used for local anesthesia.  Ultrasound confirmed patency of the right common femoral vein,  and  image stored for documentation. With continuous ultrasound guidance,  micropuncture kit was used to access the right common femoral vein.  Over a series of maneuvers, 6 Nauruan vascular sheath was placed. A 45  degree angle pigtail catheter was advanced through the right heart and  into the main pulmonary artery. Pulmonary arterial pressures were  obtained and found to be 49/16 with mean arterial pressure of 27.  Pulmonary angiogram was performed. The Bentson wire was then placed  and pigtail catheter was exchanged for a Berenstein catheter which was  used to navigate into the right lower lung pulmonary arterial system.  Bentson wire was exchanged for a superstiff Amplatz wire. A 24 Nauruan  Inari sheath was then placed into the IVC. ACT was drawn and found to  be approximately 150, therefore 5000 units of heparin was  administered. Subsequently, ACT was found to be 185 and additional  5000 units was administered. Of note, heparin drip was continued  throughout the procedure.    A FlowTriever was then advanced over the superstiff Amplatz wire and  mechanical thrombectomy performed in right lower lobe pulmonary  arterial systems. A Berenstein catheter was then used to navigate into  the upper lobe pulmonary artery where mechanical thrombectomy  performed at this location, as well. Completion pulmonary angiogram  demonstrates good result. Completion angiogram demonstrates thrombus  in the mid lower pulmonary artery laterally. Thrombectomy was again  performed at this location with some thrombus removed, though no  completion imaging performed in an effort to save contrast. The  FlowTriever and superstiff Amplatz wire were then advanced into the  left pulmonary arterial system and a wire was directed into the lower  lobe pulmonary artery where mechanical thrombectomy was performed with  a curved FlowTriever with good results and then removal of thrombus  throughout the central left lower lobe pulmonary arterial  system.  Thrombus noted in scattered distal branches, though overall, procedure  thought to be successful in debulking any central thrombus.  Postprocedure pressures were then performed and found to be 30/8 with  mean arterial pressure of 19.    FlowTriever was then removed and 24 Yi sheath pulled to the right  common iliac vein. Venogram was performed in the inferior vena cava  and a Bard [place] retrievable IVC filter was deployed in an infrarenal  location. Completion venogram confirms well positioned filter. Sheath  was then removed and a pursestring suture applied in the right groin  after sheath removal. Hemostasis then achieved with manual compression  of the right common femoral vein.    SEDATION: A moderate level of sedation was achieved with 3 mg IV  Versed and 150 mcg IV fentanyl. 10,000 units of heparin administered  throughout the procedure.  Sedation time: 107 minutes    Please note the above medications were administered by interventional  radiology staff under my direct supervision.    The patient's vital signs were monitored and remained stable  throughout the procedure.    Fluoroscopic time: 20.2 minutes  Air Kerma: 355.85 mGy  Contrast: 120 mL of Isovue 300 administered to the pulmonary arterial  system and into the venous system.  Local anesthetic: 10 mL of 1% lidocaine administered without  complication.    FINDINGS: A total of 2 venograms performed in the inferior vena cava  with placement of IVC filter at completion with good result and no  thrombus. Total of 5 pulmonary angiogram pulmonary sequences obtained  throughout the procedure. Angiogram from the main pulmonary artery  demonstrates mild to moderate scattered thrombus in both centralized  pulmonary systems. Mechanical thrombectomy then performed in the right  lower lobe and right upper lobe with good result at completion. Left  pulmonary arterial angiogram demonstrates moderate amount of thrombus  throughout the left lower lobe  pulmonary artery. Good result at  completion with removal of the centralized thrombus. There remains  scattered peripheral thrombus, overall result is successful.      Impression    IMPRESSION:  1. Successful bilateral pulmonary arterial mechanical thrombectomy.  Heparin drip is to be continued. Pursestring suture applied at the  right groin venous access site, to be removed in 2 days.  2. Successful deployment of Bard Dionne retrievable infrarenal IVC  filter. Plan will be for removal in 2 months.    MARICRUZ COREY MD         SYSTEM ID:  [id number]       Discharge Medications   Current Discharge Medication List      START taking these medications    Details   !! rivaroxaban ANTICOAGULANT (XARELTO) 15 MG TABS tablet Take 1 tablet (15 mg) by mouth 2 times daily (with meals) for 20 days  Qty: 40 tablet, Refills: 0    Associated Diagnoses: Pulmonary embolism, unspecified chronicity, unspecified pulmonary embolism type, unspecified whether acute cor pulmonale present (H)      !! rivaroxaban ANTICOAGULANT (XARELTO) 20 MG TABS tablet Take 1 tablet (20 mg) by mouth daily (with dinner)  Qty: 30 tablet, Refills: 3    Associated Diagnoses: Pulmonary embolism, unspecified chronicity, unspecified pulmonary embolism type, unspecified whether acute cor pulmonale present (H)       !! - Potential duplicate medications found. Please discuss with provider.      CONTINUE these medications which have NOT CHANGED    Details   !! ARIPiprazole (ABILIFY) 2 MG tablet Take 2 mg by mouth daily Take in addition to 5 mg tablet for total daily dose of 7 mg  Refills: 1      !! ARIPiprazole (ABILIFY) 5 MG tablet Take 5 mg by mouth daily Take in addition to 2 mg tablet for total daily dose of 7 mg      atomoxetine (STRATTERA) 80 MG capsule Take 80 mg by mouth daily      hydrochlorothiazide (HYDRODIURIL) 12.5 MG tablet TAKE 1 TABLET(12.5 MG) BY MOUTH DAILY  Qty: 90 tablet, Refills: 2    Associated Diagnoses: Hypertension goal BP (blood pressure) <  140/90      lisinopril (ZESTRIL) 10 MG tablet TAKE 1 TABLET(10 MG) BY MOUTH DAILY  Qty: 90 tablet, Refills: 2    Associated Diagnoses: Hypertension goal BP (blood pressure) < 140/90      Multiple Vitamins-Minerals (MULTIVITAMIN ADULT PO)       PRISTIQ 100 MG TB24 24 hr tablet Take 100 mg by mouth daily  Refills: 1       !! - Potential duplicate medications found. Please discuss with provider.      STOP taking these medications       ibuprofen (ADVIL/MOTRIN) 200 MG tablet Comments:   Reason for Stopping:             Allergies   No Known Allergies

## 2022-09-13 NOTE — PROGRESS NOTES
Pt decided not to wear CPAP over night today. SpO2 94% on RA. No further issues.    Medhat Jarvis, CRT

## 2022-09-13 NOTE — TELEPHONE ENCOUNTER
CATRACHITO to schedule.       Gemma Whitmore, Surgery Scheduling   [facility]  [facility]

## 2022-09-13 NOTE — TELEPHONE ENCOUNTER
Pt referred to [facility] by Kingston Viera MD for Pulmonary embolism    Patient has imaging in [facility]    Pt needs to be scheduled for consult with vascular medicine.  Will route to scheduling to coordinate an appointment at next available .    Appointment note: Pt referred to [facility] by Kingston Viera MD for Pulmonary embolism        Alejandra ZAPATA, RN    [facility]  Office: [phone]  Fax: [phone]

## 2022-09-14 ENCOUNTER — PATIENT OUTREACH (OUTPATIENT)
Dept: CARE COORDINATION | Facility: CLINIC | Age: 52
End: 2022-09-14

## 2022-09-15 ENCOUNTER — NURSE TRIAGE (OUTPATIENT)
Dept: NURSING | Facility: CLINIC | Age: 52
End: 2022-09-15

## 2022-09-15 NOTE — TELEPHONE ENCOUNTER
Patient was in the hospital from Thursday to Tuesday due to PE. Patient needed surgical intervention in IR for thrombectomy and now at the incision site he has developed a 3-4 inch hematoma. Bruise is dark red to purple in color. Patient is on Xarelto. The hematoma is at the right groin. It is not raised, warm, and no pulsating sensation. Right foot is normal color, warm.   Protocol recommends home care.   Patient care advice given. Patient to follow surgeon's instructions and activity restrictions. Patient will call back with any worsening pain at this site, bruise becomes raised, signs of infection.   Patient unable to see the incision as the dressing does not come off until tomorrow.   Patient would like Dr. Viera made aware. Routing message to provider.   Humera Lopes RN   09/15/22 7:58 AM  [facility] Nurse Advisor        Reason for Disposition    Mild bruising near incision site    Additional Information    Negative: Shock suspected (e.g., cold/pale/clammy skin, too weak to stand, low BP, rapid pulse)    Negative: Fever and purple or blood-colored spots or dots    Negative: Sounds like a life-threatening emergency to the triager    Negative: Bruise(s) of forehead or head    Negative: Major abdominal surgical incision and wound gaping open with visible internal organs    Negative: Sounds like a life-threatening emergency to the triager    Negative: Bleeding from incision and won't stop after 10 minutes of direct pressure    Negative: Bleeding (more than a few drops) from incision and after blood vessel surgery (e.g., carotidendarterectomy, femoral bypass graft, kidney dialysis fistula, tracheostomy)    Negative: Bright red, wide-spread, sunburn-like rash    Negative: SEVERE pain in the incision    Negative: Incision gaping open and < 2 days (48 hours) since wound re-opened    Negative: Incision gaping open and length of opening > 2 inches (5 cm)    Negative: Patient sounds very sick or weak to the  triager    Negative: Sounds like a serious complication to the triager    Negative: Fever > 100.4 F (38.0 C)    Negative: Incision looks infected (spreading redness, pain)    Negative: Red streak runs from the incision and longer than 1 inch (2.5 cm)    Negative: Pus or bad-smelling fluid draining from incision    Negative: Dressing soaked with blood or body fluid (e.g., drainage)    Negative: Raised bruise and size > 2 inches (5 cm) and expanding    Negative: Scant bleeding (e.g., few drops) from incision and after blood vessel surgery (e.g., carotid endarterectomy, femoral bypass graft, kidney dialysis fistula    Negative: Caller has URGENT question and triager unable to answer question    Negative: Incision gaping open and length of opening > 1/4 inch (6 mm) and on the face and over 2 days since wound re-opened    Negative: Incision gaping open and length of opening > 1/2 inch (1 cm) and over 2 days since wound re-opened    Negative: Clear or blood-tinged fluid draining from wound and no fever    Negative: Suture or staple removal is overdue    Negative: Patient wants to be seen    Negative: INCREASING pain in incision and over 2 days (48 hours) since surgery    Negative: Suture or staple came out early and caller wants wound checked    Negative: Caller has NON-URGENT question and triager unable to answer question    Negative: Pimple where a stitch comes through the skin    Negative: Suture or staple came out early    Protocols used: POST-OP INCISION SYMPTOMS AND [id number], BRUISES-A-OH

## 2022-09-21 ENCOUNTER — OFFICE VISIT (OUTPATIENT)
Dept: OTHER | Facility: CLINIC | Age: 52
End: 2022-09-21
Attending: INTERNAL MEDICINE
Payer: COMMERCIAL

## 2022-09-21 ENCOUNTER — OFFICE VISIT (OUTPATIENT)
Dept: FAMILY MEDICINE | Facility: CLINIC | Age: 52
End: 2022-09-21
Payer: COMMERCIAL

## 2022-09-21 VITALS
WEIGHT: 243.8 LBS | HEIGHT: 69 IN | SYSTOLIC BLOOD PRESSURE: 124 MMHG | DIASTOLIC BLOOD PRESSURE: 83 MMHG | HEART RATE: 81 BPM | OXYGEN SATURATION: 100 % | BODY MASS INDEX: 36.11 KG/M2

## 2022-09-21 VITALS
DIASTOLIC BLOOD PRESSURE: 80 MMHG | HEART RATE: 92 BPM | WEIGHT: 243 LBS | RESPIRATION RATE: 16 BRPM | OXYGEN SATURATION: 100 % | TEMPERATURE: 96.8 F | SYSTOLIC BLOOD PRESSURE: 104 MMHG | BODY MASS INDEX: 35.88 KG/M2

## 2022-09-21 DIAGNOSIS — I82.4Y9 DEEP VEIN THROMBOSIS (DVT) OF PROXIMAL LOWER EXTREMITY, UNSPECIFIED CHRONICITY, UNSPECIFIED LATERALITY (H): ICD-10-CM

## 2022-09-21 DIAGNOSIS — I26.99 PULMONARY EMBOLISM, OTHER, UNSPECIFIED CHRONICITY, UNSPECIFIED WHETHER ACUTE COR PULMONALE PRESENT (H): ICD-10-CM

## 2022-09-21 DIAGNOSIS — U07.1 INFECTION DUE TO 2019 NOVEL CORONAVIRUS: ICD-10-CM

## 2022-09-21 DIAGNOSIS — I10 BENIGN ESSENTIAL HYPERTENSION: ICD-10-CM

## 2022-09-21 DIAGNOSIS — E66.01 MORBID OBESITY (H): Primary | ICD-10-CM

## 2022-09-21 DIAGNOSIS — G47.33 OSA (OBSTRUCTIVE SLEEP APNEA): ICD-10-CM

## 2022-09-21 DIAGNOSIS — I26.09 ACUTE PULMONARY EMBOLISM WITH ACUTE COR PULMONALE, UNSPECIFIED PULMONARY EMBOLISM TYPE (H): Primary | ICD-10-CM

## 2022-09-21 DIAGNOSIS — I82.411 ACUTE DEEP VEIN THROMBOSIS (DVT) OF FEMORAL VEIN OF RIGHT LOWER EXTREMITY (H): ICD-10-CM

## 2022-09-21 DIAGNOSIS — I83.893 VARICOSE VEINS OF BILATERAL LOWER EXTREMITIES WITH OTHER COMPLICATIONS: ICD-10-CM

## 2022-09-21 DIAGNOSIS — Z95.828 S/P IVC FILTER: ICD-10-CM

## 2022-09-21 LAB
ANION GAP SERPL CALCULATED.3IONS-SCNC: 5 MMOL/L (ref 3–14)
BUN SERPL-MCNC: 18 MG/DL (ref 7–30)
CALCIUM SERPL-MCNC: 9.6 MG/DL (ref 8.5–10.1)
CHLORIDE BLD-SCNC: 102 MMOL/L (ref 94–109)
CO2 SERPL-SCNC: 29 MMOL/L (ref 20–32)
CREAT SERPL-MCNC: 0.93 MG/DL (ref 0.66–1.25)
ERYTHROCYTE [DISTWIDTH] IN BLOOD BY AUTOMATED COUNT: 13.6 % (ref 10–15)
GFR SERPL CREATININE-BSD FRML MDRD: >90 ML/MIN/1.73M2
GLUCOSE BLD-MCNC: 105 MG/DL (ref 70–99)
HCT VFR BLD AUTO: 44.5 % (ref 40–53)
HGB BLD-MCNC: 14.6 G/DL (ref 13.3–17.7)
MCH RBC QN AUTO: 28.1 PG (ref 26.5–33)
MCHC RBC AUTO-ENTMCNC: 32.8 G/DL (ref 31.5–36.5)
MCV RBC AUTO: 86 FL (ref 78–100)
PLATELET # BLD AUTO: 339 10E3/UL (ref 150–450)
POTASSIUM BLD-SCNC: 4 MMOL/L (ref 3.4–5.3)
RBC # BLD AUTO: 5.19 10E6/UL (ref 4.4–5.9)
SODIUM SERPL-SCNC: 136 MMOL/L (ref 133–144)
WBC # BLD AUTO: 5.3 10E3/UL (ref 4–11)

## 2022-09-21 PROCEDURE — 90682 RIV4 VACC RECOMBINANT DNA IM: CPT | Performed by: FAMILY MEDICINE

## 2022-09-21 PROCEDURE — 90471 IMMUNIZATION ADMIN: CPT | Performed by: FAMILY MEDICINE

## 2022-09-21 PROCEDURE — 99205 OFFICE O/P NEW HI 60 MIN: CPT | Performed by: INTERNAL MEDICINE

## 2022-09-21 PROCEDURE — 85027 COMPLETE CBC AUTOMATED: CPT | Performed by: FAMILY MEDICINE

## 2022-09-21 PROCEDURE — 80048 BASIC METABOLIC PNL TOTAL CA: CPT | Performed by: FAMILY MEDICINE

## 2022-09-21 PROCEDURE — G0463 HOSPITAL OUTPT CLINIC VISIT: HCPCS

## 2022-09-21 PROCEDURE — 99495 TRANSJ CARE MGMT MOD F2F 14D: CPT | Mod: 25 | Performed by: FAMILY MEDICINE

## 2022-09-21 PROCEDURE — 36415 COLL VENOUS BLD VENIPUNCTURE: CPT | Performed by: FAMILY MEDICINE

## 2022-09-21 ASSESSMENT — PAIN SCALES - GENERAL: PAINLEVEL: MILD PAIN (2)

## 2022-09-21 NOTE — PROGRESS NOTES
[facility] INITIAL VASCULAR MEDICINE CONSULT    (New patient visit)      PRIMARY HEALTH CARE PROVIDER:  Bhaskar Maldonado MD      REFERRING HEALTH CARE PROVIDER;  Kingston Viera MD      REASON FOR CONSULT: Evaluation and management of first lifetime thromboembolic event extensive bilateral PE and right lower extremity DVT developed few weeks after COVID infection followed by 5-hour airplane trip to [place] underwent to bilateral pulmonary thrombectomy and IVC filter placement on September 10, 2022      HPI: Joselito Padilla is a 51 year old very pleasant male with history of obstructive sleep apnea uses CPAP machine, hypertension well-controlled with medications, depression developed COVID infection in second week of August and recovered from it then followed by last week of August travel to [place] by airplane then developed worsening shortness of breath and leg swelling admitted to the hospital work-up revealed extensive bilateral PE with right heart strain and also acute occlusive DVT in the right distal femoral popliteal and below-knee veins.  Admission echocardiogram right heart strain with moderate dilatation of right ventricle and right ventricular dysfunction. he was initially admitted to the [facility] transferred to the [facility] underwent successful mechanical pulmonary thrombectomy and PA pressures improved.  He was initiated IV heparin in the hospital then switched to the Xarelto currently taking 15 mg twice a day for 21 days then followed by 20 mg daily    Clinically doing well since the hospital discharge no chest pain or shortness of breath  Not using any compression stockings  No personal history of malignancy  No family history of first-degree relatives hypercoagulable status of DVT or PE  He is obese with KYLER uses CPAP    He is new to this clinic reviewed available extensive records, imaging studies in the epic and updated chart      PAST MEDICAL  HISTORY  Past Medical History:   Diagnosis Date     Achilles tendon pain 11/25/2013     ADHD      CARDIOVASCULAR SCREENING; LDL GOAL LESS THAN 130 11/12/2014     Depressive disorder      Hypertension goal BP (blood pressure) < 140/90 11/25/2013     Sleep apnea        CURRENT MEDICATIONS  ARIPiprazole (ABILIFY) 2 MG tablet, Take 2 mg by mouth daily Take in addition to 5 mg tablet for total daily dose of 7 mg  ARIPiprazole (ABILIFY) 5 MG tablet, Take 5 mg by mouth daily Take in addition to 2 mg tablet for total daily dose of 7 mg  atomoxetine (STRATTERA) 80 MG capsule, Take 80 mg by mouth daily  hydrochlorothiazide (HYDRODIURIL) 12.5 MG tablet, TAKE 1 TABLET(12.5 MG) BY MOUTH DAILY (Patient taking differently: Take 12.5 mg by mouth daily TAKE 1 TABLET(12.5 MG) BY MOUTH DAILY)  lisinopril (ZESTRIL) 10 MG tablet, TAKE 1 TABLET(10 MG) BY MOUTH DAILY  Multiple Vitamins-Minerals (MULTIVITAMIN ADULT PO),   PRISTIQ 100 MG TB24 24 hr tablet, Take 100 mg by mouth daily  rivaroxaban ANTICOAGULANT (XARELTO) 15 MG TABS tablet, Take 1 tablet (15 mg) by mouth 2 times daily (with meals) for 20 days  [START ON 10/4/2022] rivaroxaban ANTICOAGULANT (XARELTO) 20 MG TABS tablet, Take 1 tablet (20 mg) by mouth daily (with dinner)    No current facility-administered medications on file prior to visit.      PAST SURGICAL HISTORY:  Past Surgical History:   Procedure Laterality Date     DENTAL SURGERY  1989    [place] teeth     heel surgery      bilateral     IR IVC FILTER PLACEMENT  9/10/2022     IR PULMONARY ANGIOGRAM BILATERAL  9/10/2022     VASECTOMY  2007       ALLERGIES   No Known Allergies    FAMILY HISTORY  Family History   Problem Relation Age of Onset     Pulmonary Hypertension Mother      Hypertension Father      Heart Disease Father         bypass surgery     C.A.D. Father      Prostate Cancer Father        VASCULAR FAMILY HISTORY  1st order relative with atherosclerotic PAD: No  1st order relative with AAA: No  Family history of  Familial Hyperlipidemia No  Family History of Hypercoagulable state:No    VASCULAR RISK FACTORS  1. Diabetes:No   2. Smoking: has never smoked.  3. HTN: controlled  4.Hyperlipidemia: Yes - uncontrolled      SOCIAL HISTORY  Social History     Socioeconomic History     Marital status:      Spouse name: wife - Soila     Number of children: 3     Years of education: Not on file     Highest education level: Not on file   Occupational History     Occupation:      Employer: [employer]   Tobacco Use     Smoking status: Never Smoker     Smokeless tobacco: Never Used   Vaping Use     Vaping Use: Never used   Substance and Sexual Activity     Alcohol use: No     Alcohol/week: 0.0 standard drinks     Drug use: No     Sexual activity: Yes     Partners: Female   Other Topics Concern      Service No     Blood Transfusions No     Caffeine Concern Not Asked     Occupational Exposure Not Asked     Hobby Hazards Not Asked     Sleep Concern Yes     Comment: Sleep apnea     Stress Concern Not Asked     Weight Concern Not Asked     Special Diet Not Asked     Back Care Not Asked     Exercise Yes     Comment: Walks     Bike Helmet Not Asked     Seat Belt Yes     Self-Exams Not Asked     Parent/sibling w/ CABG, MI or angioplasty before 65F 55M? Not Asked   Social History Narrative    He eats fruits and vegetables every day. Increased dairy recommended. Vitamin D supplement recommended.     Social Determinants of Health     Financial Resource Strain: Not on file   Food Insecurity: Not on file   Transportation Needs: Not on file   Physical Activity: Not on file   Stress: Not on file   Social Connections: Not on file   Intimate Partner Violence: Not on file   Housing Stability: Not on file       ROS:   General: No change in weight, sleep or appetite.  Normal energy.  No fever or chills  Eyes: Negative for vision changes or eye problems  ENT: No problems with ears, nose or throat.  No difficulty  "swallowing.  Resp: No coughing, wheezing or shortness of breath  CV: No chest pains or palpitations  GI: No nausea, vomiting,  heartburn, abdominal pain, diarrhea, constipation or change in bowel habits  : No urinary frequency or dysuria, bladder or kidney problems  Musculoskeletal: No significant muscle or joint pains  Neurologic: No headaches, numbness, tingling, weakness, problems with balance or coordination  Psychiatric: No problems with anxiety, depression or mental health  Heme/immune/allergy: No history of bleeding or clotting problems or anemia.  No allergies or immune system problems  Endocrine: No history of thyroid disease, diabetes or other endocrine disorders  Skin: No rashes,worrisome lesions or skin problems  Vascular:  No claudication, lifestyle limiting or otherwise; no ischemic rest pain; no non-healing ulcers. No weakness, No loss of sensation .  Recent history of right lower extremity DVT  History of varicose veins in both lower extremities no previous intervention  Recent history of bilateral extensive PE with right heart strain status post pulmonary thrombectomy and also underwent IVC filter placement    EXAM:  /83 (BP Location: Left arm, Patient Position: Chair, Cuff Size: Adult Large)   Pulse 81   Ht 5' 9\" (1.753 m)   Wt 243 lb 12.8 oz (110.6 kg)   SpO2 100%   BMI 36.00 kg/m    In general, the patient is a pleasant male in no apparent distress.    HEENT: NC/AT.  PERRLA.  EOMI.  Sclerae white, not injected.  Nares clear.  Pharynx without erythema or exudate.  Dentition intact.    Neck: No adenopathy.  No thyromegaly. Carotids +2/2 bilaterally without bruits.  No jugular venous distension.   Heart: RRR. Normal S1, S2 splits physiologically. No murmur, rub, click, or gallop. The PMI is in the 5th ICS in the midclavicular line. There is no heave.    Lungs: CTA.  No ronchi, wheezes, rales.  No dullness to percussion.   Abdomen: Soft, nontender, nondistended. No organomegaly. No AAA.  " No bruits.   Extremities: Vascular: no clubbing, cyanosis, or edema. No wounds.  Trace leg edema bilaterally  Good palpable peripheral pulses of femoral, popliteal, DP and PT  Bilateral lower extremity varicose veins with CEAP 4 CVI  No foot ulcers or leg ulcers  Both legs and feet well perfused      Labs:  LIPID RESULTS:  Lab Results   Component Value Date    CHOL 216 (H) 01/06/2022    CHOL 213 (H) 12/17/2020    HDL 55 01/06/2022    HDL 53 12/17/2020     (H) 01/06/2022     (H) 12/17/2020    TRIG 135 01/06/2022    TRIG 97 12/17/2020    CHOLHDLRATIO 3.9 01/02/2014       LIVER ENZYME RESULTS:  Lab Results   Component Value Date    AST 18 09/10/2022    AST 33 12/17/2020    ALT 37 09/10/2022    ALT 60 12/17/2020       CBC RESULTS:  Lab Results   Component Value Date    WBC 5.3 09/21/2022    WBC 4.2 10/13/2014    RBC 5.19 09/21/2022    RBC 5.17 10/13/2014    HGB 14.6 09/21/2022    HGB 14.3 04/07/2015    HCT 44.5 09/21/2022    HCT 42.6 10/13/2014    MCV 86 09/21/2022    MCV 82 10/13/2014    MCH 28.1 09/21/2022    MCH 27.5 10/13/2014    MCHC 32.8 09/21/2022    MCHC 33.3 10/13/2014    RDW 13.6 09/21/2022    RDW 13.6 10/13/2014     09/21/2022     10/13/2014       BMP RESULTS:  Lab Results   Component Value Date     09/21/2022     12/17/2020    POTASSIUM 4.0 09/21/2022    POTASSIUM 3.7 12/17/2020    CHLORIDE 102 09/21/2022    CHLORIDE 106 12/17/2020    CO2 29 09/21/2022    CO2 29 12/17/2020    ANIONGAP 5 09/21/2022    ANIONGAP 3 12/17/2020     (H) 09/21/2022    GLC 98 12/17/2020    BUN 18 09/21/2022    BUN 17 12/17/2020    CR 0.93 09/21/2022    CR 0.92 12/17/2020    GFRESTIMATED >90 09/21/2022    GFRESTIMATED >90 12/17/2020    GFRESTBLACK >90 12/17/2020    TIMI 9.6 09/21/2022    TIMI 9.3 12/17/2020        A1C RESULTS:  No results found for: A1C    THYROID RESULTS:  Lab Results   Component Value Date    TSH 2.10 10/13/2014       Procedures:     INTERVENTIONAL RADIOLOGY PULMONARY  ANGIOGRAM BILATERAL, INTERVENTIONAL  RADIOLOGY INFERIOR VENA CAVA FILTER PLACEMENT 9/10/2022 6:06 PM     HISTORY: 51-year-old patient with bilateral pulmonary emboli. Patient  presented to [facility], though there was lack of beds available  at [facility], therefore patient's transfer to [facility] was delayed. Pulmonary thrombectomy is being performed 48  hours after the initial CT exam. Patient presented after airplane trip  to [place]. Patient has shortness of breath with exertion. Troponin is  borderline elevated at 75 with moderate right heart strain and  echocardiogram. Patient was exercised today walking through the  hospital hallways and found to be short of breath with associated  tachycardia. Patient has right lower extremity DVT extending from the  distal femoral vein and distally. Therefore, there was much discussion  with Dr. Corado of the hospitalist service and the patient himself,  plan is to proceed with pulmonary angiogram, thrombectomy, and IVC  filter placement.     TECHNIQUE: Patient was brought to interventional radiology department  and informed consent obtained. Patient was placed in a supine  position. Skin overlying the right groin was prepped and draped in  standard sterile fashion. 1% lidocaine was used for local anesthesia.  Ultrasound confirmed patency of the right common femoral vein, and  image stored for documentation. With continuous ultrasound guidance,  micropuncture kit was used to access the right common femoral vein.  Over a series of maneuvers, 6 [place] vascular sheath was placed. A 45  degree angle pigtail catheter was advanced through the right heart and  into the main pulmonary artery. Pulmonary arterial pressures were  obtained and found to be 49/16 with mean arterial pressure of 27.  Pulmonary angiogram was performed. The Bentson wire was then placed  and pigtail catheter was exchanged for a Berenstein catheter which was  used to navigate into the  right lower lung pulmonary arterial system.  Bentson wire was exchanged for a superstiff Amplatz wire. A 24 Citizen of [place]  Inari sheath was then placed into the IVC. ACT was drawn and found to  be approximately 150, therefore 5000 units of heparin was  administered. Subsequently, ACT was found to be 185 and additional  5000 units was administered. Of note, heparin drip was continued  throughout the procedure.     A FlowTriever was then advanced over the superstiff Amplatz wire and  mechanical thrombectomy performed in right lower lobe pulmonary  arterial systems. A reQallenstein catheter was then used to navigate into  the upper lobe pulmonary artery where mechanical thrombectomy  performed at this location, as well. Completion pulmonary angiogram  demonstrates good result. Completion angiogram demonstrates thrombus  in the mid lower pulmonary artery laterally. Thrombectomy was again  performed at this location with some thrombus removed, though no  completion imaging performed in an effort to save contrast. The  FlowTriever and superstiff Amplatz wire were then advanced into the  left pulmonary arterial system and a wire was directed into the lower  lobe pulmonary artery where mechanical thrombectomy was performed with  a curved FlowTriever with good results and then removal of thrombus  throughout the central left lower lobe pulmonary arterial system.  Thrombus noted in scattered distal branches, though overall, procedure  thought to be successful in debulking any central thrombus.  Postprocedure pressures were then performed and found to be 30/8 with  mean arterial pressure of 19.     FlowTriever was then removed and 24 Citizen of [place] sheath pulled to the right  common iliac vein. Venogram was performed in the inferior vena cava  and a Bard Avery retrievable IVC filter was deployed in an infrarenal  location. Completion venogram confirms well positioned filter. Sheath  was then removed and a pursestring suture applied in the  right groin  after sheath removal. Hemostasis then achieved with manual compression  of the right common femoral vein.     SEDATION: A moderate level of sedation was achieved with 3 mg IV  Versed and 150 mcg IV fentanyl. 10,000 units of heparin administered  throughout the procedure.  Sedation time: 107 minutes     Please note the above medications were administered by interventional  radiology staff under my direct supervision.     The patient's vital signs were monitored and remained stable  throughout the procedure.     Fluoroscopic time: 20.2 minutes  Air Kerma: 355.85 mGy  Contrast: 120 mL of Isovue 300 administered to the pulmonary arterial  system and into the venous system.  Local anesthetic: 10 mL of 1% lidocaine administered without  complication.     FINDINGS: A total of 2 venograms performed in the inferior vena cava  with placement of IVC filter at completion with good result and no  thrombus. Total of 5 pulmonary angiogram pulmonary sequences obtained  throughout the procedure. Angiogram from the main pulmonary artery  demonstrates mild to moderate scattered thrombus in both centralized  pulmonary systems. Mechanical thrombectomy then performed in the right  lower lobe and right upper lobe with good result at completion. Left  pulmonary arterial angiogram demonstrates moderate amount of thrombus  throughout the left lower lobe pulmonary artery. Good result at  completion with removal of the centralized thrombus. There remains  scattered peripheral thrombus, overall result is successful.                                                                      IMPRESSION:  1. Successful bilateral pulmonary arterial mechanical thrombectomy.  Heparin drip is to be continued. Pursestring suture applied at the  right groin venous access site, to be removed in 2 days.  2. Successful deployment of Bard Dionne retrievable infrarenal IVC  filter. Plan will be for removal in 2 months.     MARICRUZ COREY MD       EXAM: ULTRASOUND LOWER EXTREMITY VENOUS DUPLEX BILATERAL  LOCATION: [facility]  DATE/TIME: 09/10/2022, 1:20 PM     INDICATION: Pulmonary emboli.  COMPARISON: None.  TECHNIQUE: Venous Duplex ultrasound of bilateral lower extremities with and without compression, augmentation and duplex. Color flow and spectral Doppler with waveform analysis performed.     FINDINGS: Exam includes the common femoral, femoral, popliteal veins as well as segmentally visualized deep calf veins and greater saphenous vein.      RIGHT: Positive deep venous thrombosis at the right leg extending from the distal femoral vein to the popliteal vein and posterior tibialis veins. The posterior tibialis vein deep venous thrombosis proximally appears occlusive. Other levels are nearly   occlusive. Peroneal vein cannot be visualized for assessment. No superficial thrombophlebitis. No popliteal cyst.     LEFT: No deep vein thrombosis. No superficial thrombophlebitis. No popliteal cyst.                                                                      IMPRESSION:  1.  Right leg demonstrates positive deep venous thrombosis extending from the distal right femoral vein to the popliteal and posterior tibialis veins. At the proximal right posterior tibialis vein, this is occlusive and other levels are nearly occlusive.  2.  No left leg deep venous thrombosis identified.     CT CHEST PULMONARY EMBOLISM WITH CONTRAST  9/8/2022 2:00 PM     HISTORY:  Elevated D-dimer today 2.95 with dyspnea. Elevated D-dimer.  Dyspnea, unspecified type.     TECHNIQUE: Scans obtained from the apices through the diaphragm with  IV contrast. 67 cc of Isovue-370 IV injected. Radiation dose for this  scan was reduced using automated exposure control, adjustment of the  mA and/or kV according to patient size, or iterative reconstruction  technique.     COMPARISON:  Chest x-ray on same day earlier.     FINDINGS:  Chest/mediastinum: Extensive bilateral  pulmonary emboli in the lobar  and segmental pulmonary arteries. Mild straightening of the  interventricular septum, suggesting right heart strain. No  cardiomegaly or significant pericardial effusion. No significant  mediastinal or hilar lymphadenopathy. No significant atherosclerotic  vascular calcification of the coronary arteries.     Lungs and pleura: No pleural effusion or pneumothorax. Mild basilar  pulmonary opacities, likely atelectasis. Few scattered calcified  pulmonary granulomas.     Upper abdomen: Limited evaluation of the upper abdomen due to lack of  coverage and timing of contrast. Diffuse hypoattenuation of the liver,  likely due to underlying hepatic steatosis. Fatty infiltration of the  pancreas.     Bones and soft tissue: No suspicious osseous lesion.                                                                      IMPRESSION:   1. Extensive bilateral pulmonary emboli in the lower and segmental  pulmonary arteries. Mild straightening of the interventricular septum,  suggesting right heart strain.  2. Hepatic steatosis.     CEDRIC NEGRETE MD     [facility]  Echocardiography Laboratory  [address] [place] [address]     Name: SUSANA FERNANDES  MRN: [id number]  : 1970  Study Date: 2022 02:44 PM  Age: 51 yrs  Gender: Male  Patient Location: [facility]  Reason For Study: Pulmonary Embolism  Ordering Physician: NIDHI BURDEN  Performed By: Camilla Escobedo     BSA: 2.3 m2  Height: 69 in  Weight: 246 lb  HR: 80  BP: 143/102 mmHg  ______________________________________________________________________________  Procedure  Complete Portable Echo Adult. Optison (NDC #6882-7039) given intravenously.  ______________________________________________________________________________  Interpretation Summary     Technically difficult imaging  Moderately decreased right ventricular systolic function ( best seen on short  axis parasternal veiws.)  The right  ventricle is mild to moderately dilated.( best seen on short axis  parasternal views)  Flattened septum is consistent with RV pressure overload.  Left ventricular systolic function is normal.  The visual ejection fraction is 60-65%.  The left ventricle is normal in size.  Doppler interrogation does not demonstrate signficant stenosis or  insufficinecy involving cardiac valves.     No old studies available for comparison     ______________________________________________________________________________  Left Ventricle  The left ventricle is normal in size. There is normal left ventricular wall  thickness. Left ventricular systolic function is normal. The visual ejection  fraction is 60-65%. Flattened septum is consistent with RV pressure overload.  There is no thrombus seen in the left ventricle.     Right Ventricle  The right ventricle is mild to moderately dilated. Moderately decreased right  ventricular systolic function. There is no mass or thrombus in the right  ventricle.     Atria  Normal left atrial size. Right atrial size is normal. There is no atrial shunt  seen. The left atrial appendage is not well visualized.     Mitral Valve  The mitral valve leaflets appear normal. There is no evidence of stenosis,  fluttering, or prolapse. There is no mitral regurgitation noted. There is no  mitral valve stenosis.     Tricuspid Valve  Normal tricuspid valve. No tricuspid regurgitation. Right ventricular systolic  pressure could not be approximated due to inadequate tricuspid regurgitation.  There is no tricuspid stenosis.     Aortic Valve  The aortic valve is trileaflet. No aortic regurgitation is present. No aortic  stenosis is present.     Pulmonic Valve  Normal pulmonic valve. There is no pulmonic valvular regurgitation. Increased  pulmonic valve velocity.     Vessels  The aortic root is normal size. Normal size ascending aorta. The inferior vena  cava is normal. The pulmonary artery is normal size.      Pericardium  The pericardium appears normal. There is no pleural effusion.     Rhythm  Sinus rhythm was noted.  ______________________________________________________________________________  MMode/2D Measurements & Calculations     IVSd: 0.87 cm  LVIDd: 4.2 cm  LVIDs: 2.7 cm  LVPWd: 1.0 cm  FS: 35.2 %  LV mass(C)d: 125.2 grams  LV mass(C)dI: 55.5 grams/m2  Ao root diam: 3.2 cm  LA dimension: 3.5 cm  asc Aorta Diam: 3.5 cm  LA/Ao: 1.1  LVOT diam: 2.0 cm  LVOT area: 3.1 cm2  LA Volume (BP): 35.8 ml  LA Volume Index (BP): 15.8 ml/m2  RWT: 0.48     Doppler Measurements & Calculations  MV E max brandan: 66.1 cm/sec  MV A max brandan: 61.2 cm/sec  MV E/A: 1.1  MV dec time: 0.17 sec  LV V1 max P.3 mmHg  LV V1 max: 104.0 cm/sec  LV V1 VTI: 18.3 cm  SV(LVOT): 57.5 ml  SI(LVOT): 25.5 ml/m2  PA acc time: 0.08 sec  E/E' av.1  Lateral E/e': 5.1  Medial E/e': 9.1     ______________________________________________________________________________  Report approved by: Dr. Sj Noriega 2022 03:42 PM          Assessment and Plan:     1. Bilateral Acute pulmonary embolism with acute cor pulmonale,  (H) 9/10/2022 s/p pulmonary thrombectomy First TE event provoked (developed few weeks after COVID infection and long distance airplane travel)  - Echocardiogram Complete; Future    2. S/P IVC filter 9/10/2022  3. Acute deep vein thrombosis (DVT) of femoral vein of right lower extremity (H)  - Compression Sleeve/Stocking Order for DME - ONLY FOR DME  4. KYLER (obstructive sleep apnea)  - Echocardiogram Complete; Future  5. Infection due to 2019 novel coronavirus Aug 15th 2022 , recovered.  6. Benign essential hypertension  7. Varicose veins of bilateral lower extremities with other complications, CEAP 4 CVI  - Compression Sleeve/Stocking Order for DME - ONLY FOR DME    This is a very pleasant 51-year-old male developed first lifetime thromboembolic event with extensive bilateral PE right heart strain and also right lower extremity  DVT underwent successful mechanical pulmonary thrombectomy and PA pressures improved and also underwent IVC filter placement.  Currently on Xarelto tolerating without any problems  No personal history of malignancy no family history of first-degree relatives with DVT or PE  He is non-smoker  History of KYLER uses CPAP machine  I had a lengthy discussion with the patient DVT and PE management pathophysiology, long-term implications.  Education sheets given to the patient  Since this is a first lifetime event provoked no need for hypercoagulable studies    At present my recommendations,  Continue Xarelto 15 twice daily for total 21 days then followed by 20 daily with food  Utilize compression stockings thigh-high 20 to 30 mmHg closed toes during the daytime new prescription given  Go for pending leg ultrasound/IVC ultrasound then see interventional radiologist for timing of IVC filter removal  Repeat echocardiogram in 2 months then virtual visit  Office visit in 6 months and at that time decide duration of the anticoagulation  Continue rest of the medications same  Consider getting repeat lipid panel through primary MD if it is still elevated consider statin    Thank you for the consultation !  This note was dictated by utilizing Dragon software  Copy of this note to primary care physician and referring physician    More than 60 minutes spent on the date of the encounter doing chart review, history and exam, documentation, and further activities as noted above.  He is new to this clinic reviewed recent hospitalization records, imaging studies and AVS with written instructions given    Brandy Vaughn MD, FAYOSI, FSVM, FNLA, FACP  Vascular medicine  Clinical hypertension specialist  Clinical lipidologist

## 2022-09-21 NOTE — PATIENT INSTRUCTIONS
1.  Please utilize compression stockings 20 to 30 mmHg thigh-high during the daytime and elevate the legs when able new prescription given  2.  Go for leg ultrasound/IVC ultrasound then see interventional radiologist for planned removal of IVC filter  3.  Continue Xarelto and take with food  4.  We will plan for repeating echocardiogram in 1 to 2 months then followed by virtual visit  5.  Duration of anticoagulation is approximately 6 months then followed by reassessment and decide low-dose anticoagulation versus aspirin at the time

## 2022-09-21 NOTE — PROGRESS NOTES
"  Assessment & Plan     Morbid obesity (H)      Pulmonary embolism, other, unspecified chronicity, unspecified whether acute cor pulmonale present (H)  Discussed long-term issues with PE and continuation of anticoagulation medication.  Suggested to seek vascular health for evaluation and duration of the medication.  We will get some blood work and follow-up on that.  Currently stable denies any concerns.  - Basic metabolic panel  (Ca, Cl, CO2, Creat, Gluc, K, Na, BUN); Future  - CBC with platelets; Future  - Basic metabolic panel  (Ca, Cl, CO2, Creat, Gluc, K, Na, BUN)  - CBC with platelets    Deep vein thrombosis (DVT) of proximal lower extremity, unspecified chronicity, unspecified laterality (H)    - Basic metabolic panel  (Ca, Cl, CO2, Creat, Gluc, K, Na, BUN); Future  - CBC with platelets; Future  - Basic metabolic panel  (Ca, Cl, CO2, Creat, Gluc, K, Na, BUN)  - CBC with platelets       BMI:   Estimated body mass index is 35.88 kg/m  as calculated from the following:    Height as of 1/6/22: 1.753 m (5' 9\").    Weight as of this encounter: 110.2 kg (243 lb).           Return in about 3 months (around 12/21/2022) for Physical Exam.    Bhaskar Maldonado MD  [facility] is a 51 year old, presenting for the following health issues:  Hospital F/U      HPI     Post Discharge Outreach 9/14/2022   Admission Date 9/10/2022   Reason for Admission possible thrombectomy.   Discharge Date 9/13/2022   Discharge Diagnosis Extensive pulmonary emboli likely related to recent COVID-19 infection:     Recent COVID-19:     HTN   Depression:   How are you doing now that you are home? Very good.   How are your symptoms? (Red Flag symptoms escalate to triage hotline per guidelines) Improved   Do you feel your condition is stable enough to be safe at home until your provider visit? Yes   Does the patient have their discharge instructions?  Yes   Does the patient have questions regarding their " discharge instructions?  No   Were you started on any new medications or were there changes to any of your previous medications?  Yes   Does the patient have all of their medications? Yes   Do you have questions regarding any of your medications?  No   Do you have all of your needed medical supplies or equipment (DME)?  (i.e. oxygen tank, CPAP, cane, etc.) Yes   Discharge follow-up appointment scheduled within 14 calendar days?  Yes   Discharge Follow Up Appointment Date 9/21/2022   Discharge Follow Up Appointment Scheduled with? Primary Care Provider       Summary of hospitalization:  [facility] hospital discharge summary reviewed  Diagnostic Tests/Treatments reviewed.  Follow up needed: vascular   Other Healthcare Providers Involved in Patient s Care:         None  Update since discharge: improved.     Patient was hospitalized secondary to shortness of breath later found out a massive PE with DVT.  Prior to that he had a COVID diagnosis, he traveled long distance to [place].  In the hospital he has thrombectomy done along with IVC filter.  Currently denies any worsening symptoms mild shortness of breath which is improved currently on Xarelto 15 mg twice daily.  Pending see vascular for further evaluation.    Plan of care communicated with patient      Review of Systems   Constitutional, HEENT, cardiovascular, pulmonary, gi and gu systems are negative, except as otherwise noted.      Objective    /80   Pulse 92   Temp 96.8  F (36  C) (Tympanic)   Resp 16   Wt 110.2 kg (243 lb)   SpO2 100%   BMI 35.88 kg/m    Body mass index is 35.88 kg/m .  Physical Exam   GENERAL: healthy, alert and no distress  NECK: no adenopathy, no asymmetry, masses, or scars and thyroid normal to palpation  RESP: lungs clear to auscultation - no rales, rhonchi or wheezes  CV: regular rate and rhythm, normal S1 S2, no S3 or S4, no murmur, click or rub, no peripheral edema and peripheral pulses strong  ABDOMEN: soft,  nontender, no hepatosplenomegaly, no masses and bowel sounds normal  MS: no gross musculoskeletal defects noted, no edema

## 2022-09-21 NOTE — PROGRESS NOTES
"Patient is here to discuss consult.    /83 (BP Location: Left arm, Patient Position: Chair, Cuff Size: Adult Large)   Pulse 81   Ht 5' 9\" (1.753 m)   Wt 243 lb 12.8 oz (110.6 kg)   SpO2 100%   BMI 36.00 kg/m      Questions patient would like addressed today are: N/A.    Refills are needed: N/A    Has homecare services and agency name:  Deloris Johnson  "

## 2022-10-10 ENCOUNTER — OFFICE VISIT (OUTPATIENT)
Dept: OTHER | Facility: CLINIC | Age: 52
End: 2022-10-10
Attending: RADIOLOGY
Payer: COMMERCIAL

## 2022-10-10 ENCOUNTER — HOSPITAL ENCOUNTER (OUTPATIENT)
Dept: ULTRASOUND IMAGING | Facility: CLINIC | Age: 52
Discharge: HOME | End: 2022-10-10
Attending: RADIOLOGY
Payer: COMMERCIAL

## 2022-10-10 VITALS — DIASTOLIC BLOOD PRESSURE: 84 MMHG | SYSTOLIC BLOOD PRESSURE: 148 MMHG | HEART RATE: 103 BPM

## 2022-10-10 DIAGNOSIS — Z86.711 HISTORY OF PULMONARY EMBOLISM: Primary | ICD-10-CM

## 2022-10-10 DIAGNOSIS — I26.99 PULMONARY EMBOLI (H): ICD-10-CM

## 2022-10-10 DIAGNOSIS — I26.99 PULMONARY EMBOLI (H): Primary | ICD-10-CM

## 2022-10-10 PROCEDURE — G0463 HOSPITAL OUTPT CLINIC VISIT: HCPCS

## 2022-10-10 PROCEDURE — 93971 EXTREMITY STUDY: CPT | Mod: RT

## 2022-10-10 NOTE — PROGRESS NOTES
[facility]        Patient is here for a  follow up.     Pt is currently taking Xarelto.    BP (!) 148/84 (BP Location: Left arm, Patient Position: Chair, Cuff Size: Adult Regular)   Pulse 103     The provider has been notified that the patient has no concerns.     Questions patient would like addressed today are: N/A.    Refills are needed: N/A    Has homecare services and agency name:  Deloris Preciado MA

## 2022-10-10 NOTE — RESULT ENCOUNTER NOTE
Results discussed directly with patient while patient was present. Any further details documented in the note.   Patricia Mitchell RN

## 2022-10-11 NOTE — PATIENT INSTRUCTIONS
Patient is scheduled for IVC filter removal on November 4 with check-in time at 630 at [facility] with Dr. Campo.  The patient does not need to hold AC.  And he will update preop the day of the procedure.  Written instructions were given to patient.  He will need a home COVID test within 48 hours of the procedure.

## 2022-10-11 NOTE — PROGRESS NOTES
VASCULAR OUTPATIENT CONSULT OR VISIT  PHYSICIAN:  Dr. Tim Kyle      LOCATION: [facility]    Joselito Padilla   Medical Record #:  [id number]  YOB: 1970  Age:  52 year old     Date of Service: 10/10/2022    PRIMARY CARE PROVIDER: Bhaskar Maldonado    HPI:  Joselito Padilla is a 52 year old male with a history of obstructive sleep apnea, hypertension, depression, who developed COVID infection in the second week of August and then recovered from it followed by last week of August he traveled to [place] by airplane and then developed worsening shortness of breath and leg swelling.  He then presented to [facility] where extensive work-up revealed extensive bilateral pulmonary embolism with right heart strain and also acute occlusive DVT in the right distal femoral-popliteal and above-knee veins.  Echocardiogram revealed right heart strain with moderate dilatation of the right ventricle and right ventricular dysfunction he was then transferred to [facility].  On 9/10/2022, the patient underwent a successful bilateral pulmonary arterial mechanical thrombectomy with placement of a [employer] Dionne retrievable IVC filter.  Post procedure pulmonary pressures were performed and were found to be 30/8 with a mean arterial pressure of 19.  The patient recovered well with significant improvement in symptoms.  Today, the patient states he is doing very well.  He denies shortness of breath or chest pain.  He is increasing his exercise intolerance.  He currently is on Xarelto and is tolerating well with no reports of unusual bleeding.  He is being followed by Dr. Vaughn with vascular medicine.  He does not have any lower extremity swelling or pain.  PHH:    Past Medical History:   Diagnosis Date     Achilles tendon pain 11/25/2013     ADHD      CARDIOVASCULAR SCREENING; LDL GOAL LESS THAN 130 11/12/2014     Depressive disorder      Hypertension goal BP (blood  pressure) < 140/90 11/25/2013     Sleep apnea         Past Surgical History:   Procedure Laterality Date     DENTAL SURGERY  1989    [place] teeth     heel surgery      bilateral     IR IVC FILTER PLACEMENT  9/10/2022     IR PULMONARY ANGIOGRAM BILATERAL  9/10/2022     VASECTOMY  2007       ALLERGIES:  Patient has no known allergies.    MEDS:    Current Outpatient Medications:      ARIPiprazole (ABILIFY) 2 MG tablet, Take 2 mg by mouth daily Take in addition to 5 mg tablet for total daily dose of 7 mg, Disp: , Rfl: 1     ARIPiprazole (ABILIFY) 5 MG tablet, Take 5 mg by mouth daily Take in addition to 2 mg tablet for total daily dose of 7 mg, Disp: , Rfl:      atomoxetine (STRATTERA) 80 MG capsule, Take 80 mg by mouth daily, Disp: , Rfl:      hydrochlorothiazide (HYDRODIURIL) 12.5 MG tablet, TAKE 1 TABLET(12.5 MG) BY MOUTH DAILY (Patient taking differently: Take 12.5 mg by mouth daily TAKE 1 TABLET(12.5 MG) BY MOUTH DAILY), Disp: 90 tablet, Rfl: 2     lisinopril (ZESTRIL) 10 MG tablet, TAKE 1 TABLET(10 MG) BY MOUTH DAILY, Disp: 90 tablet, Rfl: 2     Multiple Vitamins-Minerals (MULTIVITAMIN ADULT PO), , Disp: , Rfl:      PRISTIQ 100 MG TB24 24 hr tablet, Take 100 mg by mouth daily, Disp: , Rfl: 1     rivaroxaban ANTICOAGULANT (XARELTO) 20 MG TABS tablet, Take 1 tablet (20 mg) by mouth daily (with dinner), Disp: 30 tablet, Rfl: 3     rivaroxaban ANTICOAGULANT (XARELTO) 15 MG TABS tablet, Take 1 tablet (15 mg) by mouth 2 times daily (with meals) for 20 days, Disp: 40 tablet, Rfl: 0    SOCIAL HABITS:    History   Smoking Status     Never   Smokeless Tobacco     Never     Social History    Substance and Sexual Activity      Alcohol use: No        Alcohol/week: 0.0 standard drinks      History   Drug Use No       FAMILY HISTORY:    Family History   Problem Relation Age of Onset     Pulmonary Hypertension Mother      Hypertension Father      Heart Disease Father         bypass surgery     C.A.D. Father      Prostate Cancer  Father        REVIEW OF SYSTEMS:    A 12 point ROS was reviewed and except for what is listed in the HPI above, all others are negative    PE:  BP (!) 148/84 (BP Location: Left arm, Patient Position: Chair, Cuff Size: Adult Regular)   Pulse 103   Wt Readings from Last 1 Encounters:   09/21/22 243 lb 12.8 oz (110.6 kg)     There is no height or weight on file to calculate BMI.    EXAM:  GENERAL: This is a well-developed 52 year old male who appears his stated age  EYES: Grossly normal.  MOUTH: Buccal mucosa normal   MUSCULOSKELETAL: Grossly normal and both lower extremities are intact.  HEME/LYMPH: No lymphedema  NEUROLOGIC: Focally intact, Alert and oriented x 3.   PSYCH: appropriate affect  INTEGUMENT: No open lesions or ulcers  Lower extremity: No pain or swelling pulses are intact.        DIAGNOSTIC STUDIES:     Images:  US Lower Extremity Venous Duplex Right    Result Date: 10/10/2022  US RIGHT LOWER EXTREMITY VENOUS DUPLEX ULTRASOUND  10/10/2022 2:54 PM CLINICAL HISTORY/INDICATION: Status post pulmonary thrombectomy, IVC filter placement, right deep vein thrombosis. Pulmonary emboli (H). COMPARISON: 9/10/2022 TECHNIQUE: Grayscale, color-flow, and spectral waveform analysis were performed of the deep veins of the right lower extremity FINDINGS: Minimal nonocclusive deep vein thrombosis is noted in the right popliteal vein. Right external iliac, common femoral, femoral, posterior tibial and peroneal veins demonstrate normal compressibility, spectral waveform, color flow and augmentation. Resolution of deep vein thrombosis in the distal femoral and posterior tibial veins. The contralateral left common femoral vein demonstrates normal compressibility, spectral waveform, color flow and augmentation.     IMPRESSION: 1. Nonocclusive deep vein thrombosis is noted in the popliteal vein. 2. Resolution of DVT in the distal femoral and posterior tibial veins. MIGUELITO NIETO DO   SYSTEM ID:  [id number]    LABS:       Sodium   Date Value Ref Range Status   09/21/2022 136 133 - 144 mmol/L Final   09/11/2022 139 133 - 144 mmol/L Final   09/10/2022 140 133 - 144 mmol/L Final   12/17/2020 138 133 - 144 mmol/L Final   12/31/2019 139 133 - 144 mmol/L Final   12/27/2018 138 133 - 144 mmol/L Final     Urea Nitrogen   Date Value Ref Range Status   09/21/2022 18 7 - 30 mg/dL Final   09/11/2022 17 7 - 30 mg/dL Final   09/10/2022 16 7 - 30 mg/dL Final   12/17/2020 17 7 - 30 mg/dL Final   12/31/2019 16 7 - 30 mg/dL Final   12/27/2018 14 7 - 30 mg/dL Final     Hemoglobin   Date Value Ref Range Status   09/21/2022 14.6 13.3 - 17.7 g/dL Final   09/13/2022 12.9 (L) 13.3 - 17.7 g/dL Final   09/12/2022 13.1 (L) 13.3 - 17.7 g/dL Final   04/07/2015 14.3 13.3 - 17.7 g/dL Final   10/13/2014 14.2 13.3 - 17.7 g/dL Final   01/02/2014 14.9 13.3 - 17.7 g/dL Final     Platelet Count   Date Value Ref Range Status   09/21/2022 339 150 - 450 10e3/uL Final   09/13/2022 219 150 - 450 10e3/uL Final   09/12/2022 204 150 - 450 10e3/uL Final   10/13/2014 260 150 - 450 10e9/L Final   03/27/2013 239 150 - 450 10e9/L Final     Assessment/plan:  This is a pleasant 52-year-old male who underwent successful pulmonary mechanical thrombectomy with IVC filter placement on 9/10/2022.  The patient continues to do well.  He denies shortness of breath, chest pain or lower extremity swelling.  He currently is on Xarelto and is tolerating well.  We discussed the results of his venous ultrasound of the right lower extremity that was performed today.  There is nonocclusive deep vein thrombosis noted in the popliteal vein, there is resolution of the DVT in the distal femoral and posterior tibial veins.  Plan: We will plan to remove the IVC filter in the near future.  The patient is to continue follow-up with vascular medicine as previously recommended.    This was a in person visit in which 30 minutes of  total time was spent (either in face-to-face or non-face-to-face time).      Tim Kyle MD  Interventional Radiology  Pager: [phone]  [facility]

## 2022-10-19 ENCOUNTER — TELEPHONE (OUTPATIENT)
Dept: OTHER | Facility: CLINIC | Age: 52
End: 2022-10-19

## 2022-10-19 NOTE — TELEPHONE ENCOUNTER
Contacted patient to inform me that Dr. Kyle will be unavailable to do his procedure.  If that is a concern please call me back otherwise, we will continue with the scheduled IVC filter removal with another IR physician.  Dominique Mitchell RN  IR nurse clinician  [phone]

## 2022-11-04 ENCOUNTER — HOSPITAL ENCOUNTER (OUTPATIENT)
Facility: CLINIC | Age: 52
Discharge: HOME | End: 2022-11-04
Admitting: RADIOLOGY
Payer: COMMERCIAL

## 2022-11-04 ENCOUNTER — APPOINTMENT (OUTPATIENT)
Dept: INTERVENTIONAL RADIOLOGY/VASCULAR | Facility: CLINIC | Age: 52
End: 2022-11-04
Attending: RADIOLOGY
Payer: COMMERCIAL

## 2022-11-04 VITALS
HEIGHT: 69 IN | SYSTOLIC BLOOD PRESSURE: 115 MMHG | BODY MASS INDEX: 35.55 KG/M2 | TEMPERATURE: 96.4 F | RESPIRATION RATE: 14 BRPM | OXYGEN SATURATION: 99 % | DIASTOLIC BLOOD PRESSURE: 76 MMHG | HEART RATE: 71 BPM | WEIGHT: 240 LBS

## 2022-11-04 DIAGNOSIS — I26.99 PULMONARY EMBOLI (H): Primary | ICD-10-CM

## 2022-11-04 LAB
ANION GAP SERPL CALCULATED.3IONS-SCNC: 7 MMOL/L (ref 3–14)
APTT PPP: 31 SECONDS (ref 22–38)
BUN SERPL-MCNC: 18 MG/DL (ref 7–30)
CALCIUM SERPL-MCNC: 9.3 MG/DL (ref 8.5–10.1)
CHLORIDE BLD-SCNC: 105 MMOL/L (ref 94–109)
CO2 SERPL-SCNC: 28 MMOL/L (ref 20–32)
CREAT SERPL-MCNC: 0.92 MG/DL (ref 0.66–1.25)
ERYTHROCYTE [DISTWIDTH] IN BLOOD BY AUTOMATED COUNT: 13 % (ref 10–15)
GFR SERPL CREATININE-BSD FRML MDRD: >90 ML/MIN/1.73M2
GLUCOSE BLD-MCNC: 114 MG/DL (ref 70–99)
HCT VFR BLD AUTO: 44.6 % (ref 40–53)
HGB BLD-MCNC: 14.7 G/DL (ref 13.3–17.7)
INR PPP: 1.31 (ref 0.85–1.15)
MCH RBC QN AUTO: 27.9 PG (ref 26.5–33)
MCHC RBC AUTO-ENTMCNC: 33 G/DL (ref 31.5–36.5)
MCV RBC AUTO: 85 FL (ref 78–100)
PLATELET # BLD AUTO: 258 10E3/UL (ref 150–450)
POTASSIUM BLD-SCNC: 3.5 MMOL/L (ref 3.4–5.3)
RBC # BLD AUTO: 5.27 10E6/UL (ref 4.4–5.9)
SODIUM SERPL-SCNC: 140 MMOL/L (ref 133–144)
WBC # BLD AUTO: 4 10E3/UL (ref 4–11)

## 2022-11-04 PROCEDURE — 250N000009 HC RX 250: Performed by: NURSE PRACTITIONER

## 2022-11-04 PROCEDURE — 36415 COLL VENOUS BLD VENIPUNCTURE: CPT | Performed by: RADIOLOGY

## 2022-11-04 PROCEDURE — 272N000196 HC ACCESSORY CR5

## 2022-11-04 PROCEDURE — 255N000002 HC RX 255 OP 636: Performed by: RADIOLOGY

## 2022-11-04 PROCEDURE — 999N000012 HC STATISTIC ANGIOGRAM, STENT, VERTEBRO PLASTY

## 2022-11-04 PROCEDURE — C1773 RET DEV, INSERTABLE: HCPCS

## 2022-11-04 PROCEDURE — 85730 THROMBOPLASTIN TIME PARTIAL: CPT | Performed by: RADIOLOGY

## 2022-11-04 PROCEDURE — 258N000003 HC RX IP 258 OP 636: Performed by: RADIOLOGY

## 2022-11-04 PROCEDURE — C1769 GUIDE WIRE: HCPCS

## 2022-11-04 PROCEDURE — 80048 BASIC METABOLIC PNL TOTAL CA: CPT | Performed by: RADIOLOGY

## 2022-11-04 PROCEDURE — 85610 PROTHROMBIN TIME: CPT | Performed by: RADIOLOGY

## 2022-11-04 PROCEDURE — 250N000011 HC RX IP 250 OP 636: Performed by: NURSE PRACTITIONER

## 2022-11-04 PROCEDURE — 36591 DRAW BLOOD OFF VENOUS DEVICE: CPT

## 2022-11-04 PROCEDURE — 37193 REM ENDOVAS VENA CAVA FILTER: CPT

## 2022-11-04 PROCEDURE — 999N000099 HC STATISTIC MODERATE SEDATION < 10 MIN

## 2022-11-04 PROCEDURE — 85027 COMPLETE CBC AUTOMATED: CPT | Performed by: RADIOLOGY

## 2022-11-04 PROCEDURE — 250N000011 HC RX IP 250 OP 636: Performed by: RADIOLOGY

## 2022-11-04 RX ORDER — FENTANYL CITRATE 50 UG/ML
25-50 INJECTION, SOLUTION INTRAMUSCULAR; INTRAVENOUS EVERY 5 MIN PRN
Status: DISCONTINUED | OUTPATIENT
Start: 2022-11-04 | End: 2022-11-04 | Stop reason: HOSPADM

## 2022-11-04 RX ORDER — SODIUM CHLORIDE 9 MG/ML
INJECTION, SOLUTION INTRAVENOUS CONTINUOUS
Status: DISCONTINUED | OUTPATIENT
Start: 2022-11-04 | End: 2022-11-04 | Stop reason: HOSPADM

## 2022-11-04 RX ORDER — ACETAMINOPHEN 325 MG/1
650 TABLET ORAL
Status: DISCONTINUED | OUTPATIENT
Start: 2022-11-04 | End: 2022-11-04 | Stop reason: HOSPADM

## 2022-11-04 RX ORDER — NALOXONE HYDROCHLORIDE 0.4 MG/ML
0.2 INJECTION, SOLUTION INTRAMUSCULAR; INTRAVENOUS; SUBCUTANEOUS
Status: DISCONTINUED | OUTPATIENT
Start: 2022-11-04 | End: 2022-11-04 | Stop reason: HOSPADM

## 2022-11-04 RX ORDER — IOPAMIDOL 612 MG/ML
50 INJECTION, SOLUTION INTRAVASCULAR ONCE
Status: COMPLETED | OUTPATIENT
Start: 2022-11-04 | End: 2022-11-04

## 2022-11-04 RX ORDER — LIDOCAINE 40 MG/G
CREAM TOPICAL
Status: DISCONTINUED | OUTPATIENT
Start: 2022-11-04 | End: 2022-11-04 | Stop reason: HOSPADM

## 2022-11-04 RX ORDER — FLUMAZENIL 0.1 MG/ML
0.2 INJECTION, SOLUTION INTRAVENOUS
Status: DISCONTINUED | OUTPATIENT
Start: 2022-11-04 | End: 2022-11-04 | Stop reason: HOSPADM

## 2022-11-04 RX ORDER — NALOXONE HYDROCHLORIDE 0.4 MG/ML
0.4 INJECTION, SOLUTION INTRAMUSCULAR; INTRAVENOUS; SUBCUTANEOUS
Status: DISCONTINUED | OUTPATIENT
Start: 2022-11-04 | End: 2022-11-04 | Stop reason: HOSPADM

## 2022-11-04 RX ORDER — HEPARIN SODIUM 200 [USP'U]/100ML
1 INJECTION, SOLUTION INTRAVENOUS CONTINUOUS PRN
Status: DISCONTINUED | OUTPATIENT
Start: 2022-11-04 | End: 2022-11-04 | Stop reason: HOSPADM

## 2022-11-04 RX ADMIN — FENTANYL CITRATE 50 MCG: 50 INJECTION, SOLUTION INTRAMUSCULAR; INTRAVENOUS at 08:02

## 2022-11-04 RX ADMIN — MIDAZOLAM 1 MG: 1 INJECTION INTRAMUSCULAR; INTRAVENOUS at 08:02

## 2022-11-04 RX ADMIN — SODIUM CHLORIDE: 9 INJECTION, SOLUTION INTRAVENOUS at 07:43

## 2022-11-04 RX ADMIN — IOPAMIDOL 20 ML: 612 INJECTION, SOLUTION INTRAVENOUS at 08:20

## 2022-11-04 RX ADMIN — HEPARIN SODIUM 1 BAG: 200 INJECTION, SOLUTION INTRAVENOUS at 08:15

## 2022-11-04 RX ADMIN — LIDOCAINE HYDROCHLORIDE 7 ML: 10 INJECTION, SOLUTION INFILTRATION; PERINEURAL at 08:10

## 2022-11-04 ASSESSMENT — ACTIVITIES OF DAILY LIVING (ADL)
ADLS_ACUITY_SCORE: 35
ADLS_ACUITY_SCORE: 35

## 2022-11-04 NOTE — IR NOTE
Interventional Radiology Intra-procedural Nursing Note    Patient Name: Joselito Padilla  Medical Record Number: [id number]  Today's Date: November 4, 2022    Procedure: Venogram, temporary inferior vena cava filter removal with moderate sedation   Start time: 0808  End time: 0817  Report provided to: Brijesh TORRES  Patient depart time and location: 0830 to  Room 11    Note: Patient entered Interventional Radiology Suite number 1 via cart. Patient awake, alert and orientated. Assisted onto procedural table in supine position. Prepped and draped.  Dr. Parks in room. Time out and procedure started. Vital signs stable. Telemetry reading NSR.    Procedure well tolerated by patient without complications. Procedure end with debrief by Dr. Parks.  Manual pressure applied until hemostasis achieved.Quick clot and tegaderm dressing applied to right interventional procedure access site, dressing is c/d/i.    Administered medication totals:  Lidocaine 1% 7 mL Intradermal  Versed 1 mg IVP  Fentanyl 50 mcg IVP    Last dose of sedation administered at 0802.

## 2022-11-04 NOTE — PROGRESS NOTES
Care Suites Post Procedure Note    Patient Information  Name: Joselito Padilla  Age: 52 year old    Post Procedure  Time patient returned to Care Suites: 0830  Concerns/abnormal assessment: None at this time  If abnormal assessment, provider notified: N/A  Plan/Other: Monitor site, vitals and sedation    Brijesh Boswell RN

## 2022-11-04 NOTE — PROGRESS NOTES
Care Suites Discharge Nursing Note    Patient Information  Name: Joselito Padilla  Age: 52 year old    Discharge Education:  Discharge instructions reviewed: Yes  Additional education/resources provided: Per Radiology  Patient/patient representative verbalizes understanding: Yes  Patient discharging on new medications: No  Medication education completed: Yes    Discharge Plans:   Discharge location: home  Discharge ride contacted: Yes  Approximate discharge time: 0940    Discharge Criteria:  Discharge criteria met and vital signs stable: Yes.  VSS, Site CDI.  AxO    Patient Belongs:  Patient belongings returned to patient: Yes    Brijesh Boswell RN

## 2022-11-04 NOTE — DISCHARGE INSTRUCTIONS
IVC Filter Removal Discharge Instructions - Neck    After you go home:    Have an adult stay with you until tomorrow.  Drink extra fluids for 2 days.  You may resume your normal diet.  No smoking       For 24 hours - due to the sedation you received:  Relax and take it easy.  Do NOT make any important or legal decisions.  Do NOT drive or operate machines at home or at work.  Do NOT drink alcohol.    Care of Neck Puncture Site:    For the first 24 hrs - check the puncture site every 1-2 hours while awake.  Remove the bandaid after 24 hours. If there is minor oozing, apply another bandaid and remove it after 12 hours.  It is normal to have a small bruise or soreness at the site.  You may shower tomorrow. Do NOT take a bath, or use a hot tub or pool for at least 3 days. Do NOT scrub the site. Do not use lotion or powder near the puncture site.    Activity:            For 2 days:  Do NOT do any heavy activity such as exercise, lifting, or straining.  No housework, yard work or any activity that make you sweat  Do NOT lift more than 10 pounds  Avoid heavy lifting or the overuse of your shoulder for three days.           Bleeding:    If you start bleeding from the site in your neck, sit down and press gently on the site for 10 minutes.   Once bleeding stops, sit still for 2 hours.   Call the [facility] as soon as you can.       Call 911 right away if you have heavy bleeding or bleeding that does not stop.      Medicines:    If you are on Metformin (Glucophage) and your GFR (kidney function level) is >30, you may continue taking your Metformin.  If you are on Metformin (Glucophage) and your GFR (kidney function level) is <30, do not restart the Metformin for 48 hours after your procedure. Check with your primary care giver before restarting the Metformin to see if you need to have blood drawn to recheck your kidney function (GFR).  If you are taking an antiplatelet medication such as Plavix, do not stop taking  it until you talk to your provider.     Take your medications, including blood thinners, unless your provider tells you not to.    If you take Coumadin (Warfarin), have your INR checked by your provider in  3-5 days. Call your clinic to schedule this.  If you have stopped any medicines, check with your provider about when to restart them.    Follow Up Appointments:    Follow up with your primary care provider as needed.    Call the clinic if:    You have increased pain or a large or growing hard lump around the site.  The site is red, swollen, hot or tender.  Blood or fluid is draining from the site.  You have chills or a fever greater than 101 F (38 C).  You have hives, a rash or unusual itching.  Any questions or concerns.        If you have questions or your original symptoms do not improve, call:        [facility] @ [phone]

## 2022-11-04 NOTE — PRE-PROCEDURE
GENERAL PRE-PROCEDURE:   Procedure:  Venogram, temporary inferior vena cava filter removal with moderate sedation   Date/Time:  11/4/2022 7:35 AM    Written consent obtained?: Yes    Risks and benefits: Risks, benefits and alternatives were discussed    Consent given by:  Patient  Patient states understanding of procedure being performed: Yes    Patient's understanding of procedure matches consent: Yes    Procedure consent matches procedure scheduled: Yes    Expected level of sedation:  Moderate  Appropriately NPO:  Yes  ASA Class:  2  Mallampati  :  Grade 2- soft palate, base of uvula, tonsillar pillars, and portion of posterior pharyngeal wall visible  Lungs:  Lungs clear with good breath sounds bilaterally  Heart:  Normal heart sounds and rate  History & Physical reviewed:  History and physical reviewed and no updates needed  Statement of review:  I have reviewed the lab findings, diagnostic data, medications, and the plan for sedation

## 2022-11-04 NOTE — PROGRESS NOTES
Care Suites Admission Nursing Note    Patient Information  Name: Joselito Padilla  Age: 52 year old  Reason for admission: IVC Filter Removal  Care Suites arrival time: 0635    Visitor Information  Name: Soila  Informed of visitor restrictions: Yes  1 visitor allowed per patient   Visitor must screen negative for COVID symptoms   Visitor must wear a mask  Waiting rooms closed to visitors    Patient Admission/Assessment   Pre-procedure assessment complete: Yes  If abnormal assessment/labs, provider notified: N/A  NPO: Yes  Medications held per instructions/orders: Yes  Consent: deferred  If applicable, pregnancy test status: deferred  Patient oriented to room: Yes  Education/questions answered: Yes  Plan/other: Prep for procedure    Discharge Planning  Discharge name/phone number: Soila [phone]  Overnight post sedation caregiver:Soila  Discharge location: home    Brijesh Boswell RN

## 2022-11-22 ENCOUNTER — HOSPITAL ENCOUNTER (OUTPATIENT)
Dept: CARDIOLOGY | Facility: CLINIC | Age: 52
Discharge: HOME | End: 2022-11-22
Attending: INTERNAL MEDICINE | Admitting: INTERNAL MEDICINE
Payer: COMMERCIAL

## 2022-11-22 DIAGNOSIS — G47.33 OSA (OBSTRUCTIVE SLEEP APNEA): ICD-10-CM

## 2022-11-22 DIAGNOSIS — I26.99 PULMONARY EMBOLI (H): Primary | ICD-10-CM

## 2022-11-22 DIAGNOSIS — I26.09 ACUTE PULMONARY EMBOLISM WITH ACUTE COR PULMONALE, UNSPECIFIED PULMONARY EMBOLISM TYPE (H): ICD-10-CM

## 2022-11-22 LAB — LVEF ECHO: NORMAL

## 2022-11-22 PROCEDURE — 93306 TTE W/DOPPLER COMPLETE: CPT | Mod: 26 | Performed by: INTERNAL MEDICINE

## 2022-11-22 PROCEDURE — 93306 TTE W/DOPPLER COMPLETE: CPT

## 2022-11-29 ENCOUNTER — VIRTUAL VISIT (OUTPATIENT)
Dept: OTHER | Facility: CLINIC | Age: 52
End: 2022-11-29
Attending: INTERNAL MEDICINE
Payer: COMMERCIAL

## 2022-11-29 DIAGNOSIS — G47.33 OSA (OBSTRUCTIVE SLEEP APNEA): ICD-10-CM

## 2022-11-29 DIAGNOSIS — I26.09 ACUTE PULMONARY EMBOLISM WITH ACUTE COR PULMONALE, UNSPECIFIED PULMONARY EMBOLISM TYPE (H): ICD-10-CM

## 2022-11-29 DIAGNOSIS — I10 BENIGN ESSENTIAL HYPERTENSION: ICD-10-CM

## 2022-11-29 DIAGNOSIS — I82.411 ACUTE DEEP VEIN THROMBOSIS (DVT) OF FEMORAL VEIN OF RIGHT LOWER EXTREMITY (H): ICD-10-CM

## 2022-11-29 DIAGNOSIS — Z95.828 S/P IVC FILTER: ICD-10-CM

## 2022-11-29 DIAGNOSIS — U07.1 INFECTION DUE TO 2019 NOVEL CORONAVIRUS: ICD-10-CM

## 2022-11-29 DIAGNOSIS — I83.893 VARICOSE VEINS OF BILATERAL LOWER EXTREMITIES WITH OTHER COMPLICATIONS: ICD-10-CM

## 2022-11-29 PROCEDURE — 99214 OFFICE O/P EST MOD 30 MIN: CPT | Mod: 95 | Performed by: INTERNAL MEDICINE

## 2022-11-29 NOTE — PROGRESS NOTES
Sea is a 52 year old who is being evaluated via a billable video visit.      How would you like to obtain your AVS? MyChart  If the video visit is dropped, the invitation should be resent by: Text to cell phone: [phone]  Will anyone else be joining your video visit? Deloris Johnson    Provider visit note:    Chief complaint:  Follow-up visit  Review of recent echo  IVC filter was removed in first week of November  Taking Xarelto and tolerating  He was initially seen on September 21, 2022 for Evaluation and management of first lifetime thromboembolic event extensive bilateral PE and right lower extremity DVT developed few weeks after COVID infection followed by 5-hour airplane trip to [place] underwent to bilateral pulmonary thrombectomy and IVC filter placement on September 10, 2022      History of present illness:   Joselito Padilla is a 51 year old very pleasant male with history of obstructive sleep apnea uses CPAP machine, hypertension well-controlled with medications, depression developed COVID infection in second week of August and recovered from it then followed by last week of August travel to [place] by airplane then developed worsening shortness of breath and leg swelling admitted to the hospital work-up revealed extensive bilateral PE with right heart strain and also acute occlusive DVT in the right distal femoral popliteal and below-knee veins.  Admission echocardiogram right heart strain with moderate dilatation of right ventricle and right ventricular dysfunction. he was initially admitted to the [facility] transferred to the [facility] underwent successful mechanical pulmonary thrombectomy and PA pressures improved.  He was initiated IV heparin in the hospital then switched to the Xarelto currently taking 15 mg twice a day for 21 days then followed by 20 mg daily     Clinically doing well since the hospital discharge no chest pain or shortness of breath  Not using any  "compression stockings  No personal history of malignancy  No family history of first-degree relatives hypercoagulable status of DVT or PE  He is obese with KYLER uses CPAP    Reviewed recent echo significantly improved compared to initial 1 and normal LV function and RV function  Reviewed recent venous duplex ultrasound and also underwent IVC filter removal      Review of systems: Reviewed all 12 point review of systems as per HPI otherwise unremarkable    Physical exam:( no physical exam done this is virtual visit)    Reviewed recent laboratory tests, imaging studies in the epic and updated chart    Echo 11/22/2022  \"Interpretation Summary     1. Normal biventricular size and function. Left ventricular ejection fraction  of 60-65%.  2. No segmental wall motion abnormalities noted.  3. No hemodynamically significant valvular disease.  4. Negative Bubble study.  Compared to the prior study on 9/9/2022, Rv size and function are normal  Today.\"    Assessment and plan:    1. Bilateral Acute pulmonary embolism with acute cor pulmonale,  (H) 9/10/2022 s/p pulmonary thrombectomy First TE event provoked (developed few weeks after COVID infection and long distance airplane travel)     2. S/P IVC filter 9/10/2022 removed 11/04/2022  3. Acute deep vein thrombosis (DVT) of femoral vein of right lower extremity (H)  4. KYLER (obstructive sleep apnea)  5. Infection due to 2019 novel coronavirus Aug 15th 2022 , recovered.  6. Benign essential hypertension  7. Varicose veins of bilateral lower extremities with other complications, CEAP 4 CVI  8. Hyperlipidemia goal of LDL less than 70     This is a very pleasant 52-year-old male developed first lifetime thromboembolic event appears provoked  with extensive bilateral PE right heart strain and also right lower extremity DVT underwent successful mechanical pulmonary thrombectomy and PA pressures improved and also underwent IVC filter placement.  Currently on Xarelto tolerating without any " problems  No personal history of malignancy no family history of first-degree relatives with DVT or PE  He is non-smoker  History of KYLER uses CPAP machine  I had a lengthy discussion with the patient DVT and PE management pathophysiology, long-term implications.  Education sheets given to the patient during last visit  Since this is a first lifetime event provoked no need for hypercoagulable studies     At present my recommendations,  Continue Xarelto  20 mg daily with food   Utilize compression stockings thigh-high 20 to 30 mmHg closed toes during the daytime new prescription given  Office visit in March 2023 at that time decide duration of the anticoagulation  Continue rest of the medications same  Consider getting repeat lipid panel through primary MD if it is still elevated consider statin goal of LDL less than 70         Video Visit Details    Type of Service: Video Visit    Video Start Time: 1:30 PM  Video End Time: 1:47 PM    30 minutes spent on the date of the encounter doing chart review, review of recent imaging studies, previous imaging studies, laboratory tests, documentation and addressed above-mentioned multiple issues  AVS with written instructions and    Originating Location (patient location): Home    Distant Location (provider location): [facility]    Mode of Communication:  Video Conference via Crowdzu    This visit is being conducted as a virtual visit due to the emphasis on mitigation of the COVID-19 virus pandemic. The clinician has decided that the risk of an in-office visit outweighs the benefit for this patient.     Brandy Vaughn MD, RAMYA, [facility], [place]  Vascular Medicine

## 2022-11-29 NOTE — PATIENT INSTRUCTIONS
1.  Continue Xarelto 20 mg daily and take with food    2.  Continue to utilize compression stockings and elevate the leg when able    3.  Please consider getting fasting lipids through primary MD and if LDL is greater than 70 consider statin    4.  Follow-up in March 2023 and decide at that time duration of anticoagulation etc.

## 2023-01-16 NOTE — TELEPHONE ENCOUNTER
Prinzide      Last Written Prescription Date: 10/25/16  Last Fill Quantity: 90, # refills: 2  Last Office Visit with [facility], [facility] or [facility] prescribing provider: 10/25/16       Potassium   Date Value Ref Range Status   10/25/2016 3.9 3.4 - 5.3 mmol/L Final     Creatinine   Date Value Ref Range Status   10/25/2016 1.02 0.66 - 1.25 mg/dL Final     BP Readings from Last 3 Encounters:   10/25/16 108/62   08/07/15 118/70   04/07/15 124/84         Soila Blunt CMA    
Refill approved through [facility] protocol.  Yasmin Gonzalez RN  [facility]  [phone]    
What Type Of Note Output Would You Prefer (Optional)?: Bullet Format
How Severe Is Your Skin Lesion?: moderate
Has Your Skin Lesion Been Treated?: not been treated
Is This A New Presentation, Or A Follow-Up?: Skin Lesion
Additional History: Patient states that she got a cold sore and took Valtrex and the lesion when down half in size, not sure if coincidence or not.

## 2023-01-30 ASSESSMENT — ENCOUNTER SYMPTOMS
CONSTIPATION: 0
NERVOUS/ANXIOUS: 0
PALPITATIONS: 0
CHILLS: 0
SORE THROAT: 0
DIARRHEA: 0
FEVER: 0
WEAKNESS: 0
COUGH: 0
SHORTNESS OF BREATH: 0
DYSURIA: 0
ARTHRALGIAS: 0
DIZZINESS: 0
ABDOMINAL PAIN: 0
HEMATOCHEZIA: 0
HEARTBURN: 0
HEMATURIA: 0
JOINT SWELLING: 0
NAUSEA: 0
HEADACHES: 0
EYE PAIN: 0
PARESTHESIAS: 0
MYALGIAS: 0
FREQUENCY: 0

## 2023-02-01 ENCOUNTER — OFFICE VISIT (OUTPATIENT)
Dept: FAMILY MEDICINE | Facility: CLINIC | Age: 53
End: 2023-02-01
Payer: COMMERCIAL

## 2023-02-01 VITALS
WEIGHT: 251 LBS | OXYGEN SATURATION: 98 % | SYSTOLIC BLOOD PRESSURE: 112 MMHG | DIASTOLIC BLOOD PRESSURE: 84 MMHG | RESPIRATION RATE: 12 BRPM | TEMPERATURE: 97.5 F | BODY MASS INDEX: 37.18 KG/M2 | HEIGHT: 69 IN | HEART RATE: 74 BPM

## 2023-02-01 DIAGNOSIS — Z23 HIGH PRIORITY FOR 2019-NCOV VACCINE: ICD-10-CM

## 2023-02-01 DIAGNOSIS — Z13.220 LIPID SCREENING: ICD-10-CM

## 2023-02-01 DIAGNOSIS — Z00.00 ENCOUNTER FOR ANNUAL PHYSICAL EXAM: ICD-10-CM

## 2023-02-01 DIAGNOSIS — I26.09 ACUTE PULMONARY EMBOLISM WITH ACUTE COR PULMONALE, UNSPECIFIED PULMONARY EMBOLISM TYPE (H): Primary | ICD-10-CM

## 2023-02-01 DIAGNOSIS — I10 ESSENTIAL HYPERTENSION: ICD-10-CM

## 2023-02-01 DIAGNOSIS — E66.01 MORBID OBESITY (H): ICD-10-CM

## 2023-02-01 DIAGNOSIS — F33.40 RECURRENT MAJOR DEPRESSIVE DISORDER, IN REMISSION (H): ICD-10-CM

## 2023-02-01 DIAGNOSIS — Z12.5 SCREENING FOR PROSTATE CANCER: ICD-10-CM

## 2023-02-01 LAB
ALBUMIN SERPL BCG-MCNC: 4.4 G/DL (ref 3.5–5.2)
ALP SERPL-CCNC: 68 U/L (ref 40–129)
ALT SERPL W P-5'-P-CCNC: 36 U/L (ref 10–50)
ANION GAP SERPL CALCULATED.3IONS-SCNC: 12 MMOL/L (ref 7–15)
AST SERPL W P-5'-P-CCNC: 30 U/L (ref 10–50)
BILIRUB SERPL-MCNC: 0.3 MG/DL
BUN SERPL-MCNC: 12.5 MG/DL (ref 6–20)
CALCIUM SERPL-MCNC: 9.6 MG/DL (ref 8.6–10)
CHLORIDE SERPL-SCNC: 102 MMOL/L (ref 98–107)
CHOLEST SERPL-MCNC: 188 MG/DL
CREAT SERPL-MCNC: 1.01 MG/DL (ref 0.67–1.17)
DEPRECATED HCO3 PLAS-SCNC: 28 MMOL/L (ref 22–29)
GFR SERPL CREATININE-BSD FRML MDRD: 89 ML/MIN/1.73M2
GLUCOSE SERPL-MCNC: 97 MG/DL (ref 70–99)
HDLC SERPL-MCNC: 47 MG/DL
LDLC SERPL CALC-MCNC: 109 MG/DL
NONHDLC SERPL-MCNC: 141 MG/DL
POTASSIUM SERPL-SCNC: 4 MMOL/L (ref 3.4–5.3)
PROT SERPL-MCNC: 7.2 G/DL (ref 6.4–8.3)
PSA SERPL-MCNC: 2.03 NG/ML (ref 0–3.5)
SODIUM SERPL-SCNC: 142 MMOL/L (ref 136–145)
TRIGL SERPL-MCNC: 158 MG/DL

## 2023-02-01 PROCEDURE — G0103 PSA SCREENING: HCPCS | Performed by: FAMILY MEDICINE

## 2023-02-01 PROCEDURE — 0124A COVID-19 VACCINE BIVALENT BOOSTER 12+ (PFIZER): CPT | Performed by: FAMILY MEDICINE

## 2023-02-01 PROCEDURE — 90471 IMMUNIZATION ADMIN: CPT | Performed by: FAMILY MEDICINE

## 2023-02-01 PROCEDURE — 36415 COLL VENOUS BLD VENIPUNCTURE: CPT | Performed by: FAMILY MEDICINE

## 2023-02-01 PROCEDURE — 90677 PCV20 VACCINE IM: CPT | Performed by: FAMILY MEDICINE

## 2023-02-01 PROCEDURE — 80061 LIPID PANEL: CPT | Performed by: FAMILY MEDICINE

## 2023-02-01 PROCEDURE — 91312 COVID-19 VACCINE BIVALENT BOOSTER 12+ (PFIZER): CPT | Performed by: FAMILY MEDICINE

## 2023-02-01 PROCEDURE — 80053 COMPREHEN METABOLIC PANEL: CPT | Performed by: FAMILY MEDICINE

## 2023-02-01 PROCEDURE — 99386 PREV VISIT NEW AGE 40-64: CPT | Mod: 25 | Performed by: FAMILY MEDICINE

## 2023-02-01 RX ORDER — HYDROCHLOROTHIAZIDE 12.5 MG/1
12.5 TABLET ORAL DAILY
Qty: 90 TABLET | Refills: 3 | Status: SHIPPED | OUTPATIENT
Start: 2023-02-01 | End: 2024-02-07

## 2023-02-01 RX ORDER — LISINOPRIL 10 MG/1
10 TABLET ORAL DAILY
Qty: 90 TABLET | Refills: 3 | Status: SHIPPED | OUTPATIENT
Start: 2023-02-01 | End: 2024-02-07

## 2023-02-01 ASSESSMENT — ENCOUNTER SYMPTOMS
CONSTIPATION: 0
FREQUENCY: 0
NAUSEA: 0
ARTHRALGIAS: 0
HEARTBURN: 0
DYSURIA: 0
WEAKNESS: 0
FEVER: 0
PARESTHESIAS: 0
HEMATOCHEZIA: 0
SORE THROAT: 0
PALPITATIONS: 0
MYALGIAS: 0
JOINT SWELLING: 0
DIZZINESS: 0
HEMATURIA: 0
NERVOUS/ANXIOUS: 0
HEADACHES: 0
CHILLS: 0
EYE PAIN: 0
COUGH: 0
DIARRHEA: 0
ABDOMINAL PAIN: 0
SHORTNESS OF BREATH: 0

## 2023-02-01 ASSESSMENT — PATIENT HEALTH QUESTIONNAIRE - PHQ9: SUM OF ALL RESPONSES TO PHQ QUESTIONS 1-9: 1

## 2023-02-01 ASSESSMENT — PAIN SCALES - GENERAL: PAINLEVEL: NO PAIN (0)

## 2023-02-01 NOTE — PROGRESS NOTES
"  Assessment & Plan     Encounter for annual physical exam  Labs are once done we will follow-up on that.  - Comprehensive metabolic panel; Future  - Lipid Profile; Future  - Prostate Specific Antigen Screen; Future  - Comprehensive metabolic panel  - Lipid Profile  - Prostate Specific Antigen Screen    Acute pulmonary embolism with acute cor pulmonale, unspecified pulmonary embolism type (H)  Currently on blood thinners.  Thought most likely due to COVID-19 infection.    Recurrent major depressive disorder, in remission (H)  Stable.    Morbid obesity (H)      Lipid screening    - Comprehensive metabolic panel; Future  - Lipid Profile; Future  - Comprehensive metabolic panel  - Lipid Profile    Screening for prostate cancer    - Prostate Specific Antigen Screen; Future  - Prostate Specific Antigen Screen    Essential hypertension  Blood pressure stable.  - Comprehensive metabolic panel; Future  - hydrochlorothiazide (HYDRODIURIL) 12.5 MG tablet; Take 1 tablet (12.5 mg) by mouth daily  - lisinopril (ZESTRIL) 10 MG tablet; Take 1 tablet (10 mg) by mouth daily  - Comprehensive metabolic panel    High priority for 2019-nCoV vaccine    - COVID-19,PF,PFIZER BOOSTER BIVALENT 12+Yrs         BMI:   Estimated body mass index is 36.97 kg/m  as calculated from the following:    Height as of this encounter: 1.755 m (5' 9.09\").    Weight as of this encounter: 113.9 kg (251 lb).           Return in about 1 year (around 2/1/2024) for Physical Exam.    Bhaskar Maldonado MD  [facility]    Jennifer Osei is a 52 year old presenting for the following health issues:  Physical (Fasting ) and Imm/Inj (COVID-19 VACCINE)      Healthy Habits:     Getting at least 3 servings of Calcium per day:  Yes    Bi-annual eye exam:  Yes    Dental care twice a year:  Yes    Sleep apnea or symptoms of sleep apnea:  Sleep apnea    Diet:  Regular (no restrictions)    Frequency of exercise:  2-3 days/week    Duration of exercise:  " "30-45 minutes    Taking medications regularly:  Yes    Medication side effects:  None    PHQ-2 Total Score: 0    Additional concerns today:  No     Patient has underlying history of depression which is very currently controlled.  Currently seeing psychiatry.  Currently on 3 medications.  He also have underlying ADHD and on Strattera.  Blood pressure issues currently on lisinopril/hydrochlorothiazide.  No side effects.  Overall feels healthy lifestyle is somewhat sedentary , but does try to keep himself active.        Review of Systems   Constitutional: Negative for chills and fever.   HENT: Negative for congestion, ear pain, hearing loss and sore throat.    Eyes: Negative for pain and visual disturbance.   Respiratory: Negative for cough and shortness of breath.    Cardiovascular: Negative for chest pain, palpitations and peripheral edema.   Gastrointestinal: Negative for abdominal pain, constipation, diarrhea, heartburn, hematochezia and nausea.   Genitourinary: Negative for dysuria, frequency, genital sores, hematuria, impotence, penile discharge and urgency.   Musculoskeletal: Negative for arthralgias, joint swelling and myalgias.   Skin: Negative for rash.   Neurological: Negative for dizziness, weakness, headaches and paresthesias.   Psychiatric/Behavioral: Negative for mood changes. The patient is not nervous/anxious.             Objective    /84   Pulse 74   Temp 97.5  F (36.4  C) (Tympanic)   Resp 12   Ht 1.755 m (5' 9.09\")   Wt 113.9 kg (251 lb)   SpO2 98%   BMI 36.97 kg/m    Body mass index is 36.97 kg/m .  Physical Exam   GENERAL: healthy, alert and no distress  EYES: Eyes grossly normal to inspection, PERRL and conjunctivae and sclerae normal  HENT: ear canals and TM's normal, nose and mouth without ulcers or lesions  NECK: no adenopathy, no asymmetry, masses, or scars and thyroid normal to palpation  RESP: lungs clear to auscultation - no rales, rhonchi or wheezes  CV: regular rate and " rhythm, normal S1 S2, no S3 or S4, no murmur, click or rub, no peripheral edema and peripheral pulses strong  ABDOMEN: soft, nontender, no hepatosplenomegaly, no masses and bowel sounds normal  MS: no gross musculoskeletal defects noted, no edema  SKIN: no suspicious lesions or rashes  NEURO: Normal strength and tone, mentation intact and speech normal  PSYCH: mentation appears normal, affect normal/bright

## 2023-02-14 DIAGNOSIS — I26.99 PULMONARY EMBOLISM, UNSPECIFIED CHRONICITY, UNSPECIFIED PULMONARY EMBOLISM TYPE, UNSPECIFIED WHETHER ACUTE COR PULMONALE PRESENT (H): ICD-10-CM

## 2023-02-14 NOTE — TELEPHONE ENCOUNTER
rivaroxaban ANTICOAGULANT (XARELTO) 20 MG TABS tablet  Last Written Prescription Date:  10/4/22  Last Fill Quantity: 30,  # refills: 3     Last office visit: 11/29/22  Follow up recommended:  Continue Xarelto  20 mg daily with food   Office visit in March 2023 at that time decide duration of the anticoagulation    Direct Oral Anticoagulant Agents Failed 02/14/2023 11:23 AM   Protocol Details  Creatinine Clearance greater than 50 ml/min on file in past 3 mos     Routing to covering provider for review/signature.

## 2023-03-03 ENCOUNTER — OFFICE VISIT (OUTPATIENT)
Dept: OTHER | Facility: CLINIC | Age: 53
End: 2023-03-03
Attending: INTERNAL MEDICINE
Payer: COMMERCIAL

## 2023-03-03 VITALS
OXYGEN SATURATION: 99 % | SYSTOLIC BLOOD PRESSURE: 131 MMHG | HEART RATE: 96 BPM | DIASTOLIC BLOOD PRESSURE: 91 MMHG | WEIGHT: 253.8 LBS | BODY MASS INDEX: 37.59 KG/M2 | HEIGHT: 69 IN

## 2023-03-03 DIAGNOSIS — I10 BENIGN ESSENTIAL HYPERTENSION: ICD-10-CM

## 2023-03-03 DIAGNOSIS — Z95.828 S/P IVC FILTER: ICD-10-CM

## 2023-03-03 DIAGNOSIS — I83.893 VARICOSE VEINS OF BILATERAL LOWER EXTREMITIES WITH OTHER COMPLICATIONS: ICD-10-CM

## 2023-03-03 DIAGNOSIS — I26.09 ACUTE PULMONARY EMBOLISM WITH ACUTE COR PULMONALE, UNSPECIFIED PULMONARY EMBOLISM TYPE (H): Primary | ICD-10-CM

## 2023-03-03 DIAGNOSIS — G47.33 OSA (OBSTRUCTIVE SLEEP APNEA): ICD-10-CM

## 2023-03-03 DIAGNOSIS — I82.411 ACUTE DEEP VEIN THROMBOSIS (DVT) OF FEMORAL VEIN OF RIGHT LOWER EXTREMITY (H): ICD-10-CM

## 2023-03-03 DIAGNOSIS — U07.1 INFECTION DUE TO 2019 NOVEL CORONAVIRUS: ICD-10-CM

## 2023-03-03 PROCEDURE — G0463 HOSPITAL OUTPT CLINIC VISIT: HCPCS

## 2023-03-03 PROCEDURE — 99215 OFFICE O/P EST HI 40 MIN: CPT | Performed by: INTERNAL MEDICINE

## 2023-03-03 NOTE — PATIENT INSTRUCTIONS
After completion of current supply of Xarelto take reduced dose of xarelto 10 mg with food     Continue rest of the medications same     Use compression stockings and elevate legs     Follow up in 6 months and prior to visit D dimer lab test

## 2023-03-03 NOTE — PROGRESS NOTES
"Patient is here to discuss follow up    BP (!) 131/91 (BP Location: Right arm, Patient Position: Chair, Cuff Size: Adult Regular)   Pulse 96   Ht 5' 9\" (1.753 m)   Wt 253 lb 12.8 oz (115.1 kg)   SpO2 99%   BMI 37.48 kg/m      Questions patient would like addressed today are: N/A.    Refills are needed: No    Has homecare services and agency name:  Deloris BOONE    "

## 2023-03-03 NOTE — PROGRESS NOTES
SUBJECTIVE:  CC:   Follow-up visit  Review of recent studies  IVC filter was removed in first week of November 2022  Taking Xarelto 20 mg and tolerating close to 6 months  He was initially seen on September 21, 2022 for Evaluation and management of first lifetime thromboembolic event extensive bilateral PE and right lower extremity DVT developed few weeks after COVID infection followed by 5-hour airplane trip to [place] underwent to bilateral pulmonary thrombectomy and IVC filter placement on September 10, 2022  HPI:  Joselito Padilla is a 52 year old very pleasant male with history of obstructive sleep apnea uses CPAP machine, hypertension well-controlled with medications, depression developed COVID infection in second week of August and recovered from it then followed by last week of August travel to [place] by airplane then developed worsening shortness of breath and leg swelling admitted to the hospital work-up revealed extensive bilateral PE with right heart strain and also acute occlusive DVT in the right distal femoral popliteal and below-knee veins.  Admission echocardiogram right heart strain with moderate dilatation of right ventricle and right ventricular dysfunction. he was initially admitted to the [facility] underwent successful mechanical pulmonary thrombectomy and PA pressures improved.  He was initiated IV heparin in the hospital then switched to the Xarelto currently taking 15 mg twice a day for 21 days then followed by 20 mg daily     Clinically doing well no chest pain or shortness of breath   using  compression stockings  No personal history of malignancy  No family history of first-degree relatives hypercoagulable status of DVT or PE  He is obese with KYLER waiting to get  CPAP     Reviewed recent echo significantly improved compared to initial 1 and normal LV function and RV function  Reviewed recent venous duplex ultrasound and also underwent IVC  filter removal     HISTORIES:  PROBLEM LIST:   Patient Active Problem List   Diagnosis     Hypertension goal BP (blood pressure) < 140/90     Depression, major, recurrent (H)     CARDIOVASCULAR SCREENING; LDL GOAL LESS THAN 130     Sleep apnea     Francisco's deformity, right     Other postprocedural status(V45.89)     Morbid obesity (H)     Pulmonary emboli (H)     Acute pulmonary embolism with acute cor pulmonale, unspecified pulmonary embolism type (H)     PAST MEDICAL HISTORY:  Past Medical History:   Diagnosis Date     Achilles tendon pain 11/25/2013     Acute pulmonary embolism with acute cor pulmonale, unspecified pulmonary embolism type (H) 2/1/2023     ADHD      CARDIOVASCULAR SCREENING; LDL GOAL LESS THAN 130 11/12/2014     Depressive disorder      Hypertension goal BP (blood pressure) < 140/90 11/25/2013     Sleep apnea      PAST SURGICAL HISTORY:  Past Surgical History:   Procedure Laterality Date     COLONOSCOPY  2020     DENTAL SURGERY  1989    [place] teeth     heel surgery      bilateral     IR IVC FILTER PLACEMENT  09/10/2022     IR IVC FILTER REMOVAL  11/04/2022     IR PULMONARY ANGIOGRAM BILATERAL  09/10/2022     ORTHOPEDIC SURGERY  2015    Removal of Francisco's deformity     VASCULAR SURGERY  09/2022     VASECTOMY  2007     CURRENT MEDICATIONS:  Current Outpatient Medications   Medication Sig Dispense Refill     ARIPiprazole (ABILIFY) 2 MG tablet Take 2 mg by mouth daily Take in addition to 5 mg tablet for total daily dose of 7 mg  1     ARIPiprazole (ABILIFY) 5 MG tablet Take 5 mg by mouth daily Take in addition to 2 mg tablet for total daily dose of 7 mg       atomoxetine (STRATTERA) 80 MG capsule Take 80 mg by mouth daily       hydrochlorothiazide (HYDRODIURIL) 12.5 MG tablet Take 1 tablet (12.5 mg) by mouth daily 90 tablet 3     lisinopril (ZESTRIL) 10 MG tablet Take 1 tablet (10 mg) by mouth daily 90 tablet 3     Multiple Vitamins-Minerals (MULTIVITAMIN ADULT PO)        PRISTIQ 100 MG TB24 24 hr  tablet Take 100 mg by mouth daily  1     rivaroxaban ANTICOAGULANT (XARELTO) 20 MG TABS tablet Take 1 tablet (20 mg) by mouth daily (with dinner) 30 tablet 3     ALLERGIES:  No Known Allergies  SOCIAL HISTORY:  Social History     Socioeconomic History     Marital status:      Spouse name: wife Emily Cm     Number of children: 3     Years of education: Not on file     Highest education level: Not on file   Occupational History     Occupation:      Employer: [employer]   Tobacco Use     Smoking status: Never     Smokeless tobacco: Never   Vaping Use     Vaping Use: Never used   Substance and Sexual Activity     Alcohol use: No     Drug use: No     Sexual activity: Yes     Partners: Female     Birth control/protection: Male Surgical   Other Topics Concern      Service No     Blood Transfusions No     Caffeine Concern Not Asked     Occupational Exposure Not Asked     Hobby Hazards Not Asked     Sleep Concern Yes     Comment: Sleep apnea     Stress Concern Not Asked     Weight Concern Not Asked     Special Diet Not Asked     Back Care Not Asked     Exercise Yes     Comment: Walks     Bike Helmet Not Asked     Seat Belt Yes     Self-Exams Not Asked     Parent/sibling w/ CABG, MI or angioplasty before 65F 55M? Yes     Comment: Father had heart attack at the age of 54   Social History Narrative    He eats fruits and vegetables every day. Increased dairy recommended. Vitamin D supplement recommended.     Social Determinants of Health     Financial Resource Strain: Not on file   Food Insecurity: Not on file   Transportation Needs: Not on file   Physical Activity: Not on file   Stress: Not on file   Social Connections: Not on file   Intimate Partner Violence: Not on file   Housing Stability: Not on file     FAMILY HISTORY:  Family History   Problem Relation Age of Onset     Pulmonary Hypertension Mother      Hypertension Father      Heart Disease Father         bypass surgery     C.A.DEEPAK  "Father      Prostate Cancer Father      REVIEW OF SYSTEMS:  CONSTITUTIONAL:no malaise, fatigue, or other general symptoms  EYES: no subjective changes in visual acuity, no photophobia  ENT/MOUTH: no complaints of rhinorrhea, nasal congestion, sore throat, hearing changes  RESP:no SOB  CV: no c/o exertional chest pressure or MONDRAGON  GI: No abdominal pain, constipation, change in bowel movements, nausea, pyrosis, BRBPR  :no polyuria or polydipsia, no dysuria, no gross hematuria  MUSCULOSKELATAL:no arthalgias or myalgias  INTEGUMENTARY/SKIN: no pruritis, rashes, or moles with recent change in size, shape, or pigmentation  NEURO: no gross sensory or motor symptoms, no dizziness, no confusion  ENDOCRINE: no polyuria or polydipsia, no heat or cold intolerance  HEME/ALLERGY/IMMUNE: no fevers, chills, night sweats, or unwanted weight loss  PSYCHIATRIC: no depression, anxiety, or internal stimuli  EXAM:  BP (!) 131/91 (BP Location: Right arm, Patient Position: Chair, Cuff Size: Adult Regular)   Pulse 96   Ht 5' 9\" (1.753 m)   Wt 253 lb 12.8 oz (115.1 kg)   SpO2 99%   BMI 37.48 kg/m    BMI: Body mass index is 37.48 kg/m .  GENERAL APPEARANCE:  Pleasant  Healthy appearing male , alert, active, no distress cooperative.  EXAM:  EYES: clear conjunctiva, no cataracts, no obvious fundoscopic abnormalities  HENT: oropharynx, nares, and TMs are WNL  NECK: no JVD, thyromegaly or lymphadenopathy, no cervical bruits  RESP: clear to auscultation without rales, wheezes, or rhonchi  CV: RRR, no murmurs, gallops, or rubs  LYMPH: no cervical , axillary, or inguinal lymphadenopathy appreciated  GI: NABS, ND/NT, no masses or organomegally appreciated  MS: no obvoius clinicallly relevant arthropathy, no evidence vasculitis  SKIN: no nevi clinically suspicious for malignancy are noted  NEURO: CN II-XII intact, no localizing sensory or motor abnoramlities noted, DTRs symmetrical bilaterally  PSYCH: Mental status exam reveals the pt to have " normal mood and affect. There is no disorder of thought form or content. There is no response to internal stimuli. There is no suicidal or homicidal ideation.    A/P:  (I26.09)  HX Bilateral pulmonary embolism with acute cor pulmonale,  (H) 9/10/2022 s/p pulmonary thrombectomy First TE event provoked  (primary encounter diagnosis)  (I82.411) Hx of deep vein thrombosis (DVT) of femoral vein of right lower extremity (H)  (Z95.828) S/P IVC filter 9/10/2022, removed in 11/2022  (G47.33) KYLER (obstructive sleep apnea)  (U07.1) Infection due to 2019 novel coronavirus Aug 15th 2022 , recovered.  (I10) Benign essential hypertension  (I83.893) Varicose veins of bilateral lower extremities with other complications, CEAP 4 CVI    This is a very pleasant 53-year-old male developed first lifetime thromboembolic event appears provoked  with extensive bilateral PE right heart strain and also right lower extremity DVT underwent successful mechanical pulmonary thrombectomy and PA pressures improved and also underwent IVC filter placement then removal.  Currently on Xarelto tolerating without any problems  No personal history of malignancy no family history of first-degree relatives with DVT or PE  He is non-smoker  History of KYLER   Since this is a first lifetime event provoked no need for hypercoagulable studies almost completed 6 months of xarelto 20 mg      At present my recommendations,  Complete current supply of  Xarelto  20 mg daily with food then decrease dose to 10 mg daily new Rxsent    D dimer in 6 months then decide switching to asa etc   Utilize compression stockings thigh-high 20 to 30 mmHg closed toes during the daytime new prescription given  Continue rest of the medications same  Consider getting repeat lipid panel through primary MD if it is still elevated consider statin goal of LDL less than 70        Brandy Vaughn MD, FAYOSI, FSVM, FNLA  Vascular Medicine

## 2023-03-28 ENCOUNTER — OFFICE VISIT (OUTPATIENT)
Dept: URGENT CARE | Facility: URGENT CARE | Age: 53
End: 2023-03-28
Payer: COMMERCIAL

## 2023-03-28 VITALS
WEIGHT: 252 LBS | TEMPERATURE: 97.7 F | OXYGEN SATURATION: 100 % | DIASTOLIC BLOOD PRESSURE: 89 MMHG | BODY MASS INDEX: 37.21 KG/M2 | HEART RATE: 92 BPM | SYSTOLIC BLOOD PRESSURE: 142 MMHG

## 2023-03-28 DIAGNOSIS — J02.9 SORE THROAT: ICD-10-CM

## 2023-03-28 DIAGNOSIS — J01.10 ACUTE NON-RECURRENT FRONTAL SINUSITIS: Primary | ICD-10-CM

## 2023-03-28 LAB
DEPRECATED S PYO AG THROAT QL EIA: NEGATIVE
GROUP A STREP BY PCR: NOT DETECTED

## 2023-03-28 PROCEDURE — 87651 STREP A DNA AMP PROBE: CPT | Performed by: PHYSICIAN ASSISTANT

## 2023-03-28 PROCEDURE — 99213 OFFICE O/P EST LOW 20 MIN: CPT | Performed by: PHYSICIAN ASSISTANT

## 2023-03-28 NOTE — PROGRESS NOTES
Chief Complaint   Patient presents with     Cough     Headache sore throat ongoing for 3 weeks -tested negative for covid -19 earlier on his symptoms        ASSESSMENT/PLAN:  Joselito was seen today for cough.    Diagnoses and all orders for this visit:    Acute non-recurrent frontal sinusitis  -     amoxicillin-clavulanate (AUGMENTIN) 875-125 MG tablet; Take 1 tablet by mouth 2 times daily for 10 days    Sore throat  -     Streptococcus A Rapid Screen w/Reflex to PCR - Clinic Collect  -     Group A Streptococcus PCR Throat Swab    Difficult to say whether this is bacterial versus viral bronchitis or sinusitis.  Given length of symptoms may be more bacterial however he is been improving overall indicative more of a viral cause.  Daughter and wife have similar symptoms and improved rapidly on antibiotics.  Discussed this with patient, will use a stepwise approach by initially trying to treat the symptoms for suspected viral cause, see AVS, and if symptoms do not improve over the next week he will start the Augmentin.  Patient happy with plan    Marty Parker PA-C      SUBJECTIVE:  Joselito is a 52 year old male who presents to urgent care with 3 weeks of illness.  He has had headache, sore throat, nasal congestion and cough.  His symptoms have been improving but symptoms stalled over the last week.  Daughter and wife had similar symptoms and was prescribed antibiotics and improved rapidly.    ROS: Pertinent ROS neg other than the symptoms noted above in the HPI.     OBJECTIVE:  BP (!) 142/89   Pulse 92   Temp 97.7  F (36.5  C) (Tympanic)   Wt 114.3 kg (252 lb)   SpO2 100%   BMI 37.21 kg/m     GENERAL: healthy, alert and no distress  EYES: Eyes grossly normal to inspection, PERRL and conjunctivae and sclerae normal  HENT: ear canals and TM's normal, nose and mouth without ulcers or lesions, nasal mucosa erythema with mucopurulent discharge  RESP: lungs clear to auscultation - no rales, rhonchi or wheezes  CV:  regular rate and rhythm, normal S1 S2, no S3 or S4, no murmur, click or rub delete  DIAGNOSTICS    Results for orders placed or performed in visit on 03/28/23   Streptococcus A Rapid Screen w/Reflex to PCR - Clinic Collect     Status: Normal    Specimen: Throat; Swab   Result Value Ref Range    Group A Strep antigen Negative Negative        Current Outpatient Medications   Medication     ARIPiprazole (ABILIFY) 2 MG tablet     ARIPiprazole (ABILIFY) 5 MG tablet     atomoxetine (STRATTERA) 80 MG capsule     hydrochlorothiazide (HYDRODIURIL) 12.5 MG tablet     lisinopril (ZESTRIL) 10 MG tablet     Multiple Vitamins-Minerals (MULTIVITAMIN ADULT PO)     PRISTIQ 100 MG TB24 24 hr tablet     rivaroxaban ANTICOAGULANT (XARELTO) 10 MG TABS tablet     No current facility-administered medications for this visit.      Patient Active Problem List   Diagnosis     Hypertension goal BP (blood pressure) < 140/90     Depression, major, recurrent (H)     CARDIOVASCULAR SCREENING; LDL GOAL LESS THAN 130     Sleep apnea     Francisco's deformity, right     Other postprocedural status(V45.89)     Morbid obesity (H)     Pulmonary emboli (H)     Acute pulmonary embolism with acute cor pulmonale, unspecified pulmonary embolism type (H)      Past Medical History:   Diagnosis Date     Achilles tendon pain 11/25/2013     Acute pulmonary embolism with acute cor pulmonale, unspecified pulmonary embolism type (H) 2/1/2023     ADHD      CARDIOVASCULAR SCREENING; LDL GOAL LESS THAN 130 11/12/2014     Depressive disorder      Hypertension goal BP (blood pressure) < 140/90 11/25/2013     Sleep apnea      Past Surgical History:   Procedure Laterality Date     COLONOSCOPY  2020     DENTAL SURGERY  1989    [place] teeth     heel surgery      bilateral     IR IVC FILTER PLACEMENT  09/10/2022     IR IVC FILTER REMOVAL  11/04/2022     IR PULMONARY ANGIOGRAM BILATERAL  09/10/2022     ORTHOPEDIC SURGERY  2015    Removal of Francisco's deformity     VASCULAR  SURGERY  09/2022     VASECTOMY  2007     Family History   Problem Relation Age of Onset     Pulmonary Hypertension Mother      Hypertension Father      Heart Disease Father         bypass surgery     C.A.D. Father      Prostate Cancer Father      Social History     Tobacco Use     Smoking status: Never     Smokeless tobacco: Never   Substance Use Topics     Alcohol use: No              The plan of care was discussed with the patient. They understand and agree with the course of treatment prescribed. A printed summary was given including instructions and medications.  The use of Dragon/[employer] dictation services may have been used to construct the content in this note; any grammatical or spelling errors are non-intentional. Please contact the author of this note directly if you are in need of any clarification.

## 2023-03-28 NOTE — PATIENT INSTRUCTIONS
.Improve your sinus hygiene by:  Flonase/fluticasone nasal spray 2 sprays in each nostril once a day for 1 - 4 weeks.  This may take several days to become effective.   Consider saline nasal rinses  Humidified air  Psuedofed can help if you tolerate it but do not take if you have high blood pressure     Start Augmentin if symptoms not improving in 7 days

## 2023-04-20 ENCOUNTER — PATIENT OUTREACH (OUTPATIENT)
Dept: GASTROENTEROLOGY | Facility: CLINIC | Age: 53
End: 2023-04-20
Payer: COMMERCIAL

## 2023-04-20 DIAGNOSIS — Z12.11 SPECIAL SCREENING FOR MALIGNANT NEOPLASMS, COLON: Primary | ICD-10-CM

## 2023-04-20 NOTE — PROGRESS NOTES
"Ordering/Referring Provider: Dr. Bhaskar Maldonado  BMI: Estimated body mass index is 37.21 kg/m  as calculated from the following:    Height as of 3/3/23: 1.753 m (5' 9\").    Weight as of 3/28/23: 114.3 kg (252 lb).     Sedation:  Based on patients medical history patient is appropriate for   MAC/deep sedation.   BMI<= 45 45 < BMI <= 48 48 < BMI < = 50  BMI > 50   No Restrictions No [facility] ASC  No [facility]  [facility] with exceptions Hospital Only OR Only       Location:  Does patient have an LVAD?  No    Does patient have a history of asthma, COPD or any other lung disease?  Yes     Patient has a documented history of KYLER (wears CPAP) and pulmonary emboli hx.     Review of chart, indicate respiratory disease has resulted in multiple hospitalizations (Hospital Only).    Does patient use home oxygen?  No--not this year    Does patient have a history of cardiac disease?  No    Is pataient awaiting a heart or lung transplant?   No    Has patient had a stroke or transient ischemic attack in the last 6 months?   No    Is the patient currently on dialysis?   No    Prep:  Previous prep (last colonoscopy):   Unknown    Quality of previous prep:   Excellent    Is patient currently on dialysis, is ESRD, or CKD stage 4/5?   No (standard prep)    Does patient have a diagnosis of diabetes?  No    Does patient have a diagnosis of cystic fibrosis (CF)?  No    BMI > 40?  No    Final Referral Status:  meets the criteria for placement of colonoscopy screening  referral order.      Referral order placed with the following recommendations:  Sedation: MAC/Deep Sedation  Location Type: Hospital due to KYLER  Prep: Standard MiraLAX  PAC: No      "

## 2023-06-02 ENCOUNTER — TELEPHONE (OUTPATIENT)
Dept: GASTROENTEROLOGY | Facility: CLINIC | Age: 53
End: 2023-06-02
Payer: COMMERCIAL

## 2023-06-02 NOTE — TELEPHONE ENCOUNTER
Screening Questions  BLUE  KIND OF PREP RED  LOCATION [review exclusion criteria] GREEN  SEDATION TYPE        Y Are you active on mychart?       MELECIO Ordering/Referring Provider?        [facility] What type of coverage do you have?      N Have you had a positive covid test in the last 14 days?      36.21. BMI  [BMI 40+ - review exclusion criteria& smart-phrase document]    Y  2. Are you able to give consent for your medical care? [IF NO,RN REVIEW]          N  3. Are you taking any prescription pain medications on a routine schedule   (ex narcotics: oxycodone, roxicodone, oxycontin,  and percocet)? [RN Review]          3a. EXTENDED PREP What kind of prescription?     N 4. Do you have any chemical dependencies such as alcohol, street drugs, or methadone?        **If yes 3- 5 , please schedule with MAC sedation.**          IF YES TO ANY 6 - 10 - HOSPITAL SETTING ONLY.     N 6.   Do you need assistance transferring?     N 7.   Have you had a heart or lung transplant?    N 8.   Are you currently on dialysis?   N 9.   Do you use daily home oxygen?   N 10. Do you take nitroglycerin?   10a.  If yes, how often?      11. Are you currently pregnant?    11a.  If yes, how many weeks? [ Greater than 12 weeks, OR NEEDED]    N 12. Do you have Pulmonary Hypertension? *NEED PAC APPT AT [facility] w/ MAC*     N 13. [review exclusion criteria]  Do you have any implantable devices in your body (pacemaker, defib, LVAD)?    N 14. In the past 6 months, have you had any heart related issues including cardiomyopathy or heart attack?     14a.  If yes, did it require cardiac stenting if so when?     N 15. Have you had a stroke or Transient ischemic attack (TIA - aka  mini stroke ) within 6 months?      Y 16. Do you have mod to severe Obstructive Sleep Apnea?  [Hospital only]    N 17. Do you have SEVERE AND UNCONTROLLED asthma? *NEED PAC APPT AT [facility] w/MAC*     18.Do you take blood thinners?  Yes     Are you taking Effient/Prasugrel?  No, you must  "contact your prescribing provider for direction on holding or bridging with a different medication.    N 19. Do you take any of the following medications?    Phentermine    Ozempic    Wegovy (Semaglutide)      19a. If yes, \"Hold for 7 days before procedure.  Please consult your prescribing provider if you have questions about holding this medication.\"     N  20. Do you have chronic kidney disease?      N  21. Do you have a diagnosis of diabetes?     N  22. On a regular basis do you go 3-5 days between bowel movements?      23. Preferred LOCAL Pharmacy for Pre Prescription         [id number] - [address] AT [facility] OF  [address]        - CLOSING REMINDERS -    You will receive a call from a Nurse to review instructions and health history.  This assessment must be completed prior to your procedure.  Failure to complete the Nurse assessment may result in the procedure being cancelled.      On the day of your procedure, please designatean adult(s) who can drive you home stay with you for the next 24 hours. The medicines used in the exam will make you sleepy. You will not be able to drive.      You cannot take public transportation, ride share services, or non-medical taxi service without a responsible caregiver.  Medical transport services are allowed with the requirement that a responsible caregiver will receive you at your destination.  We require that drivers and caregivers are confirmed prior to your procedure.      - SCHEDULING DETAILS -  Y & KYLER Hospital Setting Required & If yes, what is the exclusion?   TRACY  Surgeon    7/25  Date of Procedure  Lower Endoscopy [Colonoscopy]  Type of Procedure Scheduled  [facility]   MIRALAX GATORADE WITHOUT MAGNEISUM CITRATE Which Colonoscopy Prep was Sent?     MAC Sedation Type     N PAC / Pre-op Required               "

## 2023-06-07 ENCOUNTER — TELEPHONE (OUTPATIENT)
Dept: GASTROENTEROLOGY | Facility: CLINIC | Age: 53
End: 2023-06-07
Payer: COMMERCIAL

## 2023-06-07 NOTE — TELEPHONE ENCOUNTER
Caller: Joselito Padilla   Reason for Reschedule/Cancellation (please be detailed, any staff messages or encounters to note?):     PER PT -- CHANGE DATE & LOCATION       Prior to reschedule please review:    Ordering Provider:Bhaskar Maldonado MD      Sedation per order:DEEP/MAC     Does patient have any ASC Exclusions, please identify?: Y - KYLER       Notes on Cancelled Procedure:  1. Procedure:Lower Endoscopy [Colonoscopy]   2. Date: 7/25/2023  3. Location:[facility]; [address], [address], [place] [address]  4. Surgeon: TRACY        Rescheduled: YES     Procedure: Lower Endoscopy [Colonoscopy]    Date: 09/13/2023    Location:[facility]; [address] [place] [address] [place] [address]    Surgeon: ALEXANDRA     Sedation Level Scheduled  MAC          Reason for Sedation Level PER ORDER     Prep/Instructions updated and sent: MenigaLA

## 2023-07-24 ENCOUNTER — TRANSFERRED RECORDS (OUTPATIENT)
Dept: HEALTH INFORMATION MANAGEMENT | Facility: CLINIC | Age: 53
End: 2023-07-24

## 2023-08-01 NOTE — TELEPHONE ENCOUNTER
Caller: Joselito Padilla   Reason for Reschedule/Cancellation (please be detailed, any staff messages or encounters to note?):     PER PT -- 2ND R/S PER PT REQUEST -- CHANGE DATE       Prior to reschedule please review:  Ordering Provider:Bhaskar Maldonado MD    Sedation per order:DEEP/MAC   Does patient have any ASC Exclusions, please identify?: Y - KYLER       Notes on Cancelled Procedure:  Procedure:Lower Endoscopy [Colonoscopy]   Date: 09/13/2023  Location:[facility]; [address] [place] [address]  Surgeon: ALEXANDRA         Rescheduled: YES   Procedure: Lower Endoscopy [Colonoscopy]  Date: 10/11/2023  Location:[facility]; [address] [place] [address]  Surgeon: ALEXANDRA   Sedation Level Scheduled  MAC          Reason for Sedation Level PER ORDER   Prep/Instructions updated and sent: Codasip        Send In - basket message to Panc - Robert Pool if EUS procedure is canceled or rescheduled: [ N/A, YES or NO] N/A

## 2023-09-25 ENCOUNTER — LAB (OUTPATIENT)
Dept: LAB | Facility: CLINIC | Age: 53
End: 2023-09-25
Payer: COMMERCIAL

## 2023-09-25 DIAGNOSIS — I82.411 ACUTE DEEP VEIN THROMBOSIS (DVT) OF FEMORAL VEIN OF RIGHT LOWER EXTREMITY (H): ICD-10-CM

## 2023-09-25 DIAGNOSIS — I26.09 ACUTE PULMONARY EMBOLISM WITH ACUTE COR PULMONALE, UNSPECIFIED PULMONARY EMBOLISM TYPE (H): ICD-10-CM

## 2023-09-25 LAB — D DIMER PPP FEU-MCNC: <0.27 UG/ML FEU (ref 0–0.5)

## 2023-09-25 PROCEDURE — 36415 COLL VENOUS BLD VENIPUNCTURE: CPT

## 2023-09-25 PROCEDURE — 85379 FIBRIN DEGRADATION QUANT: CPT

## 2023-09-28 ENCOUNTER — OFFICE VISIT (OUTPATIENT)
Dept: OTHER | Facility: CLINIC | Age: 53
End: 2023-09-28
Attending: INTERNAL MEDICINE
Payer: COMMERCIAL

## 2023-09-28 VITALS
HEIGHT: 69 IN | DIASTOLIC BLOOD PRESSURE: 86 MMHG | SYSTOLIC BLOOD PRESSURE: 133 MMHG | BODY MASS INDEX: 37.65 KG/M2 | WEIGHT: 254.2 LBS | OXYGEN SATURATION: 96 % | HEART RATE: 83 BPM

## 2023-09-28 DIAGNOSIS — Z95.828 S/P IVC FILTER: ICD-10-CM

## 2023-09-28 DIAGNOSIS — U07.1 INFECTION DUE TO 2019 NOVEL CORONAVIRUS: ICD-10-CM

## 2023-09-28 DIAGNOSIS — I82.411 ACUTE DEEP VEIN THROMBOSIS (DVT) OF FEMORAL VEIN OF RIGHT LOWER EXTREMITY (H): ICD-10-CM

## 2023-09-28 DIAGNOSIS — I83.893 VARICOSE VEINS OF BILATERAL LOWER EXTREMITIES WITH OTHER COMPLICATIONS: ICD-10-CM

## 2023-09-28 DIAGNOSIS — I10 BENIGN ESSENTIAL HYPERTENSION: ICD-10-CM

## 2023-09-28 DIAGNOSIS — R06.02 SOB (SHORTNESS OF BREATH): ICD-10-CM

## 2023-09-28 DIAGNOSIS — G47.33 OSA (OBSTRUCTIVE SLEEP APNEA): ICD-10-CM

## 2023-09-28 DIAGNOSIS — I26.09 ACUTE PULMONARY EMBOLISM WITH ACUTE COR PULMONALE, UNSPECIFIED PULMONARY EMBOLISM TYPE (H): Primary | ICD-10-CM

## 2023-09-28 PROCEDURE — 99214 OFFICE O/P EST MOD 30 MIN: CPT | Performed by: INTERNAL MEDICINE

## 2023-09-28 PROCEDURE — G0463 HOSPITAL OUTPT CLINIC VISIT: HCPCS | Performed by: INTERNAL MEDICINE

## 2023-09-28 NOTE — PROGRESS NOTES
SUBJECTIVE:  CC:   Follow-up visit  Review of recent studies  Exertional SOB   Planing for colonoscopy in 2 weeks   IVC filter was removed in first week of November 2022  Taking Xarelto 10 mg and tolerating   He was initially seen on September 21, 2022 for Evaluation and management of first lifetime thromboembolic event extensive bilateral PE and right lower extremity DVT developed few weeks after COVID infection followed by 5-hour airplane trip to [place] underwent to bilateral pulmonary thrombectomy and IVC filter placement on September 10, 2022  HPI:  Joselito Padilla is a 53 year old very pleasant male with history of obstructive sleep apnea uses CPAP machine, hypertension well-controlled with medications, depression developed COVID infection in second week of August and recovered from it then followed by last week of August travel to [place] by airplane then developed worsening shortness of breath and leg swelling admitted to the hospital work-up revealed extensive bilateral PE with right heart strain and also acute occlusive DVT in the right distal femoral popliteal and below-knee veins.  Admission echocardiogram right heart strain with moderate dilatation of right ventricle and right ventricular dysfunction. he was [facility] to [facility] transferred [facility] the [facility] underwent successful mechanical pulmonary thrombectomy and PA pressures improved.  He was initiated IV heparin in the hospital then switched to the Xarelto currently taking 15 mg twice a day for 21 days then followed by 20 mg daily     Clinically doing well no chest pain or shortness of breath   using  compression stockings  No personal history of malignancy  No family history of first-degree relatives hypercoagulable status of DVT or PE  He is obese with KYLER waiting to get  CPAP  Reviewed recent echo significantly improved compared to initial 1 and normal LV function and RV function  Reviewed recent venous duplex  ultrasound and also underwent IVC filter removal     HISTORIES:  PROBLEM LIST:   Patient Active Problem List   Diagnosis    Hypertension goal BP (blood pressure) < 140/90    Depression, major, recurrent (H)    CARDIOVASCULAR SCREENING; LDL GOAL LESS THAN 130    Sleep apnea    Francisco's deformity, right    Other postprocedural status(V45.89)    Morbid obesity (H)    Pulmonary emboli (H)    Acute pulmonary embolism with acute cor pulmonale, unspecified pulmonary embolism type (H)     PAST MEDICAL HISTORY:  Past Medical History:   Diagnosis Date    Achilles tendon pain 11/25/2013    Acute pulmonary embolism with acute cor pulmonale, unspecified pulmonary embolism type (H) 2/1/2023    ADHD     CARDIOVASCULAR SCREENING; LDL GOAL LESS THAN 130 11/12/2014    Depressive disorder     Hypertension goal BP (blood pressure) < 140/90 11/25/2013    Sleep apnea      PAST SURGICAL HISTORY:  Past Surgical History:   Procedure Laterality Date    COLONOSCOPY  2020    DENTAL SURGERY  1989    [place] teeth    heel surgery      bilateral    IR IVC FILTER PLACEMENT  09/10/2022    IR IVC FILTER REMOVAL  11/04/2022    IR PULMONARY ANGIOGRAM BILATERAL  09/10/2022    ORTHOPEDIC SURGERY  2015    Removal of Francisco's deformity    VASCULAR SURGERY  09/2022    VASECTOMY  2007     CURRENT MEDICATIONS:  Current Outpatient Medications   Medication Sig Dispense Refill    ARIPiprazole (ABILIFY) 2 MG tablet Take 2 mg by mouth daily Take in addition to 5 mg tablet for total daily dose of 7 mg  1    ARIPiprazole (ABILIFY) 5 MG tablet Take 5 mg by mouth daily Take in addition to 2 mg tablet for total daily dose of 7 mg      atomoxetine (STRATTERA) 80 MG capsule Take 80 mg by mouth daily      hydrochlorothiazide (HYDRODIURIL) 12.5 MG tablet Take 1 tablet (12.5 mg) by mouth daily 90 tablet 3    lisinopril (ZESTRIL) 10 MG tablet Take 1 tablet (10 mg) by mouth daily 90 tablet 3    Multiple Vitamins-Minerals (MULTIVITAMIN ADULT PO)       PRISTIQ 100 MG TB24 24  hr tablet Take 100 mg by mouth daily  1    rivaroxaban ANTICOAGULANT (XARELTO) 10 MG TABS tablet Take 1 tablet (10 mg) by mouth daily (with dinner) 90 tablet 3     ALLERGIES:  No Known Allergies  SOCIAL HISTORY:  Social History     Socioeconomic History    Marital status:      Spouse name: wife Emily Cm    Number of children: 3    Years of education: Not on file    Highest education level: Not on file   Occupational History    Occupation:      Employer: [employer]   Tobacco Use    Smoking status: Never    Smokeless tobacco: Never   Vaping Use    Vaping Use: Never used   Substance and Sexual Activity    Alcohol use: No    Drug use: No    Sexual activity: Yes     Partners: Female     Birth control/protection: Male Surgical   Other Topics Concern     Service No    Blood Transfusions No    Caffeine Concern Not Asked    Occupational Exposure Not Asked    Hobby Hazards Not Asked    Sleep Concern Yes     Comment: Sleep apnea    Stress Concern Not Asked    Weight Concern Not Asked    Special Diet Not Asked    Back Care Not Asked    Exercise Yes     Comment: Walks    Bike Helmet Not Asked    Seat Belt Yes    Self-Exams Not Asked    Parent/sibling w/ CABG, MI or angioplasty before 65F 55M? Yes     Comment: Father had heart attack at the age of 54   Social History Narrative    He eats fruits and vegetables every day. Increased dairy recommended. Vitamin D supplement recommended.     Social Determinants of Health     Financial Resource Strain: Low Risk  (9/26/2023)    Financial Resource Strain     Within the past 12 months, have you or your family members you live with been unable to get utilities (heat, electricity) when it was really needed?: No   Food Insecurity: Low Risk  (9/26/2023)    Food Insecurity     Within the past 12 months, did you worry that your food would run out before you got money to buy more?: No     Within the past 12 months, did the food you bought just not last and  "you didn t have money to get more?: No   Transportation Needs: Low Risk  (9/26/2023)    Transportation Needs     Within the past 12 months, has lack of transportation kept you from medical appointments, getting your medicines, non-medical meetings or appointments, work, or from getting things that you need?: No   Physical Activity: Not on file   Stress: Not on file   Social Connections: Not on file   Interpersonal Safety: Not on file   Housing Stability: Low Risk  (9/26/2023)    Housing Stability     Do you have housing? : Yes     Are you worried about losing your housing?: No     FAMILY HISTORY:  Family History   Problem Relation Age of Onset    Pulmonary Hypertension Mother     Hypertension Father     Heart Disease Father         bypass surgery    C.A.D. Father     Prostate Cancer Father      REVIEW OF SYSTEMS:  CONSTITUTIONAL:no malaise, fatigue, or other general symptoms  EYES: no subjective changes in visual acuity, no photophobia  ENT/MOUTH: no complaints of rhinorrhea, nasal congestion, sore throat, hearing changes  RESP:no SOB  CV: no c/o exertional chest pressure or MONDRAGON  GI: No abdominal pain, constipation, change in bowel movements, nausea, pyrosis, BRBPR  :no polyuria or polydipsia, no dysuria, no gross hematuria  MUSCULOSKELATAL:no arthalgias or myalgias  INTEGUMENTARY/SKIN: no pruritis, rashes, or moles with recent change in size, shape, or pigmentation  NEURO: no gross sensory or motor symptoms, no dizziness, no confusion  ENDOCRINE: no polyuria or polydipsia, no heat or cold intolerance  HEME/ALLERGY/IMMUNE: no fevers, chills, night sweats, or unwanted weight loss  PSYCHIATRIC: no depression, anxiety, or internal stimuli  EXAM:  /86 (BP Location: Right arm, Patient Position: Chair, Cuff Size: Adult Regular)   Pulse 83   Ht 5' 8.5\" (1.74 m)   Wt 254 lb 3.2 oz (115.3 kg)   SpO2 96%   BMI 38.09 kg/m    BMI: Body mass index is 38.09 kg/m .  GENERAL APPEARANCE:  Pleasant  Healthy appearing " male , alert, active, no distress cooperative.  EXAM:  EYES: clear conjunctiva, no cataracts, no obvious fundoscopic abnormalities  HENT: oropharynx, nares, and TMs are WNL  NECK: no JVD, thyromegaly or lymphadenopathy, no cervical bruits  RESP: clear to auscultation without rales, wheezes, or rhonchi  CV: RRR, no murmurs, gallops, or rubs  LYMPH: no cervical , axillary, or inguinal lymphadenopathy appreciated  GI: NABS, ND/NT, no masses or organomegally appreciated  MS: no obvoius clinicallly relevant arthropathy, no evidence vasculitis  SKIN: no nevi clinically suspicious for malignancy are noted  NEURO: CN II-XII intact, no localizing sensory or motor abnoramlities noted, DTRs symmetrical bilaterally  PSYCH: Mental status exam reveals the pt to have normal mood and affect. There is no disorder of thought form or content. There is no response to internal stimuli. There is no suicidal or homicidal ideation.    A/P:  (I26.09)  HX Bilateral pulmonary embolism with acute cor pulmonale,  (H) 9/10/2022 s/p pulmonary thrombectomy First TE event provoked  (primary encounter diagnosis)  Exertional SOB   (I82.411) Hx of deep vein thrombosis (DVT) of femoral vein of right lower extremity (H)  (Z95.828) S/P IVC filter 9/10/2022, removed in 11/2022  (G47.33) KYLER (obstructive sleep apnea)  (U07.1) Infection due to 2019 novel coronavirus Aug 15th 2022 , recovered.  (I10) Benign essential hypertension  (I83.893) Varicose veins of bilateral lower extremities with other complications, CEAP 4 CVI    This is a very pleasant 53-year-old male developed first lifetime thromboembolic event appears provoked  with extensive bilateral PE right heart strain and also right lower extremity DVT underwent successful mechanical pulmonary thrombectomy and PA pressures improved and also underwent IVC filter placement then removal.  Currently on Xarelto tolerating without any problems  No personal history of malignancy no family history of  first-degree relatives with DVT or PE  He is non-smoker  History of KYLER   Since this is a first lifetime event provoked no need for hypercoagulable studies almost completed 6 months of xarelto 20 mg      At present my recommendations,  Continue 10 mg daily   Arrange ECHO and Cardiopulmonary stress test order placed   Follow up Office or virtual one week after tests completed  Utilize compression stockings thigh-high 20 to 30 mmHg closed toes during the daytime new prescription given  Continue rest of the medications same  Consider getting repeat lipid panel through primary MD if it is still elevated consider statin goal of LDL less than 70        Brandy Vaughn MD, FAHA, FSVM, FNLA  Vascular Medicine

## 2023-09-28 NOTE — PATIENT INSTRUCTIONS
Please go for ECHO and Cardiopulmonary stress test then see me a week after     Continue xarelto and take with food     Wear CPAP machine     Wear compression stockings and continue rest of the medications same    Patient/Caregiver provided printed discharge information.

## 2023-09-28 NOTE — PROGRESS NOTES
"Patient is here to discuss follow up    /86 (BP Location: Right arm, Patient Position: Chair, Cuff Size: Adult Regular)   Pulse 83   Ht 5' 8.5\" (1.74 m)   Wt 254 lb 3.2 oz (115.3 kg)   SpO2 96%   BMI 38.09 kg/m      Questions patient would like addressed today are: N/A.    Refills are needed: No    Has homecare services and agency name:  Deloris BOONE    "

## 2023-09-29 ENCOUNTER — TELEPHONE (OUTPATIENT)
Dept: OTHER | Facility: CLINIC | Age: 53
End: 2023-09-29
Payer: COMMERCIAL

## 2023-09-29 DIAGNOSIS — I26.09 ACUTE PULMONARY EMBOLISM WITH ACUTE COR PULMONALE, UNSPECIFIED PULMONARY EMBOLISM TYPE (H): Primary | ICD-10-CM

## 2023-09-29 DIAGNOSIS — I10 HYPERTENSION GOAL BP (BLOOD PRESSURE) < 140/90: ICD-10-CM

## 2023-09-29 DIAGNOSIS — R06.02 SOB (SHORTNESS OF BREATH): ICD-10-CM

## 2023-09-29 NOTE — TELEPHONE ENCOUNTER
Spoke to patient, transferred patient to centralized scheduling to schedule imaging. Patient will call [facility] to schedule virtual visit one week after imaging.

## 2023-09-29 NOTE — TELEPHONE ENCOUNTER
Patient needs to be scheduled for echocardiogram, cardiopulmonary stress test at next available and virtual follow up a week after tests are completed with Dr. Vaughn.    Appt note: Follow up to 9/28/23. Echocardiogram and stress test prior.    Alejandra ZAPATA, BRIAN    [facility]  [facility]  Office: [phone]  Fax: [phone]

## 2023-10-02 ASSESSMENT — PATIENT HEALTH QUESTIONNAIRE - PHQ9
SUM OF ALL RESPONSES TO PHQ QUESTIONS 1-9: 1
SUM OF ALL RESPONSES TO PHQ QUESTIONS 1-9: 1
10. IF YOU CHECKED OFF ANY PROBLEMS, HOW DIFFICULT HAVE THESE PROBLEMS MADE IT FOR YOU TO DO YOUR WORK, TAKE CARE OF THINGS AT HOME, OR GET ALONG WITH OTHER PEOPLE: NOT DIFFICULT AT ALL

## 2023-10-03 ENCOUNTER — OFFICE VISIT (OUTPATIENT)
Dept: FAMILY MEDICINE | Facility: CLINIC | Age: 53
End: 2023-10-03
Payer: COMMERCIAL

## 2023-10-03 VITALS
SYSTOLIC BLOOD PRESSURE: 130 MMHG | RESPIRATION RATE: 11 BRPM | TEMPERATURE: 96.9 F | HEART RATE: 74 BPM | DIASTOLIC BLOOD PRESSURE: 82 MMHG | OXYGEN SATURATION: 97 % | WEIGHT: 252 LBS | HEIGHT: 69 IN | BODY MASS INDEX: 37.33 KG/M2

## 2023-10-03 DIAGNOSIS — I10 ESSENTIAL HYPERTENSION: Primary | ICD-10-CM

## 2023-10-03 DIAGNOSIS — Z01.818 PRE-OP EXAM: ICD-10-CM

## 2023-10-03 DIAGNOSIS — F33.40 RECURRENT MAJOR DEPRESSIVE DISORDER, IN REMISSION (H): ICD-10-CM

## 2023-10-03 DIAGNOSIS — I26.99 PULMONARY EMBOLISM, OTHER, UNSPECIFIED CHRONICITY, UNSPECIFIED WHETHER ACUTE COR PULMONALE PRESENT (H): ICD-10-CM

## 2023-10-03 DIAGNOSIS — Z23 HIGH PRIORITY FOR 2019-NCOV VACCINE: ICD-10-CM

## 2023-10-03 LAB
ANION GAP SERPL CALCULATED.3IONS-SCNC: 13 MMOL/L (ref 7–15)
BUN SERPL-MCNC: 13.6 MG/DL (ref 6–20)
CALCIUM SERPL-MCNC: 9.7 MG/DL (ref 8.6–10)
CHLORIDE SERPL-SCNC: 101 MMOL/L (ref 98–107)
CREAT SERPL-MCNC: 0.97 MG/DL (ref 0.67–1.17)
DEPRECATED HCO3 PLAS-SCNC: 27 MMOL/L (ref 22–29)
EGFRCR SERPLBLD CKD-EPI 2021: >90 ML/MIN/1.73M2
GLUCOSE SERPL-MCNC: 92 MG/DL (ref 70–99)
HGB BLD-MCNC: 15.7 G/DL (ref 13.3–17.7)
POTASSIUM SERPL-SCNC: 4 MMOL/L (ref 3.4–5.3)
SODIUM SERPL-SCNC: 141 MMOL/L (ref 135–145)

## 2023-10-03 PROCEDURE — 99214 OFFICE O/P EST MOD 30 MIN: CPT | Mod: 25 | Performed by: FAMILY MEDICINE

## 2023-10-03 PROCEDURE — 85018 HEMOGLOBIN: CPT | Performed by: FAMILY MEDICINE

## 2023-10-03 PROCEDURE — 80048 BASIC METABOLIC PNL TOTAL CA: CPT | Performed by: FAMILY MEDICINE

## 2023-10-03 PROCEDURE — 90480 ADMN SARSCOV2 VAC 1/ONLY CMP: CPT | Performed by: FAMILY MEDICINE

## 2023-10-03 PROCEDURE — 91320 SARSCV2 VAC 30MCG TRS-SUC IM: CPT | Performed by: FAMILY MEDICINE

## 2023-10-03 PROCEDURE — 36415 COLL VENOUS BLD VENIPUNCTURE: CPT | Performed by: FAMILY MEDICINE

## 2023-10-03 PROCEDURE — 90682 RIV4 VACC RECOMBINANT DNA IM: CPT | Performed by: FAMILY MEDICINE

## 2023-10-03 PROCEDURE — 90471 IMMUNIZATION ADMIN: CPT | Performed by: FAMILY MEDICINE

## 2023-10-03 ASSESSMENT — PAIN SCALES - GENERAL: PAINLEVEL: NO PAIN (0)

## 2023-10-03 NOTE — PROGRESS NOTES
[address] [address]  Phone: [phone]  Primary Provider: Maria Antonia Maldonado  Pre-op Performing Provider: MARIA ANTONIA MALDONADO    {  PREOPERATIVE EVALUATION:  Today's date: 10/3/2023    Sea is a 53 year old male who presents for a preoperative evaluation.      10/3/2023     8:41 AM   Additional Questions   Roomed by lacie       Surgical Information:  Surgery/Procedure: Colonoscopy  Surgery Location:  GI  Surgeon: JUS VALERA  Surgery Date: 10/11/2023  Time of Surgery: 9:00 AM  Where patient plans to recover: At home with family  Fax number for surgical facility: Note does not need to be faxed, will be available electronically in Epic.    Assessment & Plan     The proposed surgical procedure is considered LOW risk.    Essential hypertension    - BASIC METABOLIC PANEL; Future  - Hemoglobin; Future    Pre-op exam    - BASIC METABOLIC PANEL; Future  - Hemoglobin; Future    High priority for 2019-nCoV vaccine      Recurrent major depressive disorder, in remission (H24)      Pulmonary embolism, other, unspecified chronicity, unspecified whether acute cor pulmonale present (H)  Currently on Xarelto.  He has spoke with vascular advised to hold for 3 days prior to the procedure.           No data to display                    - No identified additional risk factors other than previously addressed    Antiplatelet or Anticoagulation Medication Instructions:  Advised to hold anticoagulant for 3 days prior to surgery.    Additional Medication Instructions:  Patient is to take all scheduled medications on the day of surgery    RECOMMENDATION:  APPROVAL GIVEN to proceed with proposed procedure, without further diagnostic evaluation.            Subjective       HPI related to upcoming procedure:         9/26/2023     9:08 AM   Preop Questions   1. Have you ever had a heart attack or stroke? No   2. Have you ever had surgery on your heart or blood vessels, such as a  stent placement, a coronary artery bypass, or surgery on an artery in your head, neck, heart, or legs? No   3. Do you have chest pain with activity? No   4. Do you have a history of  heart failure? No   5. Do you currently have a cold, bronchitis or symptoms of other infection? No   6. Do you have a cough, shortness of breath, or wheezing? No   7. Do you or anyone in your family have previous history of blood clots? YES -    8. Do you or does anyone in your family have a serious bleeding problem such as prolonged bleeding following surgeries or cuts? No   9. Have you ever had problems with anemia or been told to take iron pills? No   10. Have you had any abnormal blood loss such as black, tarry or bloody stools? No   11. Have you ever had a blood transfusion? No   12. Are you willing to have a blood transfusion if it is medically needed before, during, or after your surgery? Yes   13. Have you or any of your relatives ever had problems with anesthesia? No   14. Do you have sleep apnea, excessive snoring or daytime drowsiness? YES -    14a. Do you have a CPAP machine? Yes   15. Do you have any artifical heart valves or other implanted medical devices like a pacemaker, defibrillator, or continuous glucose monitor? No   16. Do you have artificial joints? No   17. Are you allergic to latex? No       Health Care Directive:  Patient has a Health Care Directive on file      Preoperative Review of :   reviewed - no record of controlled substances prescribed.      Status of Chronic Conditions:  See problem list for active medical problems.  Problems all longstanding and stable, except as noted/documented.  See ROS for pertinent symptoms related to these conditions.    Review of Systems  CONSTITUTIONAL: NEGATIVE for fever, chills, change in weight  INTEGUMENTARY/SKIN: NEGATIVE for worrisome rashes, moles or lesions  EYES: NEGATIVE for vision changes or irritation  ENT/MOUTH: NEGATIVE for ear, mouth and throat  problems  RESP: NEGATIVE for significant cough or SOB  CV: NEGATIVE for chest pain, palpitations or peripheral edema  GI: NEGATIVE for nausea, abdominal pain, heartburn, or change in bowel habits  : NEGATIVE for frequency, dysuria, or hematuria  MUSCULOSKELETAL: NEGATIVE for significant arthralgias or myalgia  NEURO: NEGATIVE for weakness, dizziness or paresthesias  ENDOCRINE: NEGATIVE for temperature intolerance, skin/hair changes  HEME: NEGATIVE for bleeding problems  PSYCHIATRIC: NEGATIVE for changes in mood or affect    Patient Active Problem List    Diagnosis Date Noted    Hypertension goal BP (blood pressure) < 140/90 11/25/2013     Priority: High     Sees  Celestino lester  NP  [facility] in [place]      Acute pulmonary embolism with acute cor pulmonale, unspecified pulmonary embolism type (H) 02/01/2023     Priority: Medium    Pulmonary emboli (H) 09/10/2022     Priority: Medium    Morbid obesity (H) 01/06/2022     Priority: Medium    Francisco's deformity, 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     Priority: Medium    Other postprocedural status(V45.89) 09/30/2015     Priority: Medium    Sleep apnea 04/07/2015     Priority: Medium    CARDIOVASCULAR SCREENING; LDL GOAL LESS THAN 130 11/12/2014     Priority: Medium    Depression, major, recurrent (H24) 10/13/2014     Priority: Medium      Past Medical History:   Diagnosis Date    Achilles tendon pain 11/25/2013    Acute pulmonary embolism with acute cor pulmonale, unspecified pulmonary embolism type (H) 2/1/2023    ADHD     CARDIOVASCULAR SCREENING; LDL GOAL LESS THAN 130 11/12/2014    Depressive disorder     Hypertension goal BP (blood pressure) < 140/90 11/25/2013    Sleep apnea      Past Surgical History:   Procedure Laterality Date    COLONOSCOPY  2020    DENTAL SURGERY  1989    [place] teeth    heel surgery      bilateral    IR IVC FILTER PLACEMENT  09/10/2022    IR IVC FILTER REMOVAL  11/04/2022    IR PULMONARY ANGIOGRAM BILATERAL  09/10/2022    ORTHOPEDIC SURGERY   "2015    Removal of Francisco's deformity    VASCULAR SURGERY  09/2022    VASECTOMY  2007     Current Outpatient Medications   Medication Sig Dispense Refill    ARIPiprazole (ABILIFY) 2 MG tablet Take 2 mg by mouth daily Take in addition to 5 mg tablet for total daily dose of 7 mg  1    ARIPiprazole (ABILIFY) 5 MG tablet Take 5 mg by mouth daily Take in addition to 2 mg tablet for total daily dose of 7 mg      atomoxetine (STRATTERA) 80 MG capsule Take 80 mg by mouth daily      hydrochlorothiazide (HYDRODIURIL) 12.5 MG tablet Take 1 tablet (12.5 mg) by mouth daily 90 tablet 3    lisinopril (ZESTRIL) 10 MG tablet Take 1 tablet (10 mg) by mouth daily 90 tablet 3    Multiple Vitamins-Minerals (MULTIVITAMIN ADULT PO)       PRISTIQ 100 MG TB24 24 hr tablet Take 100 mg by mouth daily  1    rivaroxaban ANTICOAGULANT (XARELTO) 10 MG TABS tablet Take 1 tablet (10 mg) by mouth daily (with dinner) 90 tablet 3       No Known Allergies     Social History     Tobacco Use    Smoking status: Never    Smokeless tobacco: Never   Substance Use Topics    Alcohol use: No     Family History   Problem Relation Age of Onset    Pulmonary Hypertension Mother     Hypertension Father     Heart Disease Father         bypass surgery    C.A.D. Father     Prostate Cancer Father      History   Drug Use No         Objective     /82   Pulse 74   Temp 96.9  F (36.1  C) (Temporal)   Resp 11   Ht 1.753 m (5' 9\")   Wt 114.3 kg (252 lb)   SpO2 97%   BMI 37.21 kg/m      Physical Exam  GENERAL APPEARANCE: healthy, alert and no distress  HENT: ear canals and TM's normal and nose and mouth without ulcers or lesions  RESP: lungs clear to auscultation - no rales, rhonchi or wheezes  CV: regular rate and rhythm, normal S1 S2, no S3 or S4 and no murmur, click or rub   ABDOMEN: soft, nontender, no HSM or masses and bowel sounds normal  NEURO: Normal strength and tone, sensory exam grossly normal, mentation intact and speech " normal        Diagnostics:  Results for orders placed or performed in visit on 10/03/23   BASIC METABOLIC PANEL     Status: Normal   Result Value Ref Range    Sodium 141 135 - 145 mmol/L    Potassium 4.0 3.4 - 5.3 mmol/L    Chloride 101 98 - 107 mmol/L    Carbon Dioxide (CO2) 27 22 - 29 mmol/L    Anion Gap 13 7 - 15 mmol/L    Urea Nitrogen 13.6 6.0 - 20.0 mg/dL    Creatinine 0.97 0.67 - 1.17 mg/dL    GFR Estimate >90 >60 mL/min/1.73m2    Calcium 9.7 8.6 - 10.0 mg/dL    Glucose 92 70 - 99 mg/dL   Hemoglobin     Status: Normal   Result Value Ref Range    Hemoglobin 15.7 13.3 - 17.7 g/dL           Revised Cardiac Risk Index (RCRI):  The patient has the following serious cardiovascular risks for perioperative complications:   - No serious cardiac risks = 0 points     RCRI Interpretation: 0 points: Class I (very low risk - 0.4% complication rate)         Signed Electronically by: Bhaskar Maldonado MD  Copy of this evaluation report is provided to requesting physician.

## 2023-10-11 ENCOUNTER — HOSPITAL ENCOUNTER (OUTPATIENT)
Facility: CLINIC | Age: 53
Discharge: HOME | End: 2023-10-11
Attending: COLON & RECTAL SURGERY | Admitting: COLON & RECTAL SURGERY
Payer: COMMERCIAL

## 2023-10-11 ENCOUNTER — ANESTHESIA EVENT (OUTPATIENT)
Dept: GASTROENTEROLOGY | Facility: CLINIC | Age: 53
End: 2023-10-11
Payer: COMMERCIAL

## 2023-10-11 ENCOUNTER — ANESTHESIA (OUTPATIENT)
Dept: GASTROENTEROLOGY | Facility: CLINIC | Age: 53
End: 2023-10-11
Payer: COMMERCIAL

## 2023-10-11 ENCOUNTER — PATIENT OUTREACH (OUTPATIENT)
Dept: GASTROENTEROLOGY | Facility: CLINIC | Age: 53
End: 2023-10-11
Payer: COMMERCIAL

## 2023-10-11 VITALS
HEART RATE: 59 BPM | SYSTOLIC BLOOD PRESSURE: 137 MMHG | DIASTOLIC BLOOD PRESSURE: 95 MMHG | OXYGEN SATURATION: 95 % | RESPIRATION RATE: 20 BRPM

## 2023-10-11 LAB — COLONOSCOPY: NORMAL

## 2023-10-11 PROCEDURE — 45378 DIAGNOSTIC COLONOSCOPY: CPT | Performed by: COLON & RECTAL SURGERY

## 2023-10-11 PROCEDURE — 258N000003 HC RX IP 258 OP 636: Performed by: NURSE ANESTHETIST, CERTIFIED REGISTERED

## 2023-10-11 PROCEDURE — G0121 COLON CA SCRN NOT HI RSK IND: HCPCS | Performed by: COLON & RECTAL SURGERY

## 2023-10-11 PROCEDURE — 999N000010 HC STATISTIC ANES STAT CODE-CRNA PER MINUTE: Performed by: COLON & RECTAL SURGERY

## 2023-10-11 PROCEDURE — 370N000017 HC ANESTHESIA TECHNICAL FEE, PER MIN: Performed by: COLON & RECTAL SURGERY

## 2023-10-11 PROCEDURE — 250N000009 HC RX 250: Performed by: NURSE ANESTHETIST, CERTIFIED REGISTERED

## 2023-10-11 PROCEDURE — 250N000011 HC RX IP 250 OP 636: Performed by: NURSE ANESTHETIST, CERTIFIED REGISTERED

## 2023-10-11 RX ORDER — LIDOCAINE 40 MG/G
CREAM TOPICAL
Status: DISCONTINUED | OUTPATIENT
Start: 2023-10-11 | End: 2023-10-11 | Stop reason: HOSPADM

## 2023-10-11 RX ORDER — ONDANSETRON 2 MG/ML
4 INJECTION INTRAMUSCULAR; INTRAVENOUS
Status: DISCONTINUED | OUTPATIENT
Start: 2023-10-11 | End: 2023-10-11 | Stop reason: HOSPADM

## 2023-10-11 RX ORDER — PROPOFOL 10 MG/ML
INJECTION, EMULSION INTRAVENOUS CONTINUOUS PRN
Status: DISCONTINUED | OUTPATIENT
Start: 2023-10-11 | End: 2023-10-11

## 2023-10-11 RX ORDER — LIDOCAINE HYDROCHLORIDE 20 MG/ML
INJECTION, SOLUTION INFILTRATION; PERINEURAL PRN
Status: DISCONTINUED | OUTPATIENT
Start: 2023-10-11 | End: 2023-10-11

## 2023-10-11 RX ORDER — PROPOFOL 10 MG/ML
INJECTION, EMULSION INTRAVENOUS PRN
Status: DISCONTINUED | OUTPATIENT
Start: 2023-10-11 | End: 2023-10-11

## 2023-10-11 RX ORDER — SODIUM CHLORIDE, SODIUM LACTATE, POTASSIUM CHLORIDE, CALCIUM CHLORIDE 600; 310; 30; 20 MG/100ML; MG/100ML; MG/100ML; MG/100ML
INJECTION, SOLUTION INTRAVENOUS CONTINUOUS PRN
Status: DISCONTINUED | OUTPATIENT
Start: 2023-10-11 | End: 2023-10-11

## 2023-10-11 RX ADMIN — PROPOFOL 40 MG: 10 INJECTION, EMULSION INTRAVENOUS at 08:57

## 2023-10-11 RX ADMIN — LIDOCAINE HYDROCHLORIDE 40 MG: 20 INJECTION, SOLUTION INFILTRATION; PERINEURAL at 08:57

## 2023-10-11 RX ADMIN — PROPOFOL 150 MCG/KG/MIN: 10 INJECTION, EMULSION INTRAVENOUS at 08:57

## 2023-10-11 RX ADMIN — SODIUM CHLORIDE, POTASSIUM CHLORIDE, SODIUM LACTATE AND CALCIUM CHLORIDE: 600; 310; 30; 20 INJECTION, SOLUTION INTRAVENOUS at 08:55

## 2023-10-11 ASSESSMENT — ACTIVITIES OF DAILY LIVING (ADL): ADLS_ACUITY_SCORE: 35

## 2023-10-11 NOTE — ANESTHESIA POSTPROCEDURE EVALUATION
Patient: Joselito Padilla    Procedure: Procedure(s):  Colonoscopy       Anesthesia Type:  MAC    Note:  Disposition: Outpatient   Postop Pain Control: Uneventful            Sign Out: Well controlled pain   PONV: No   Neuro/Psych: Uneventful            Sign Out: Acceptable/Baseline neuro status   Airway/Respiratory: Uneventful            Sign Out: Acceptable/Baseline resp. status   CV/Hemodynamics: Uneventful            Sign Out: Acceptable CV status   Other NRE: NONE   DID A NON-ROUTINE EVENT OCCUR?            Last vitals:  Vitals Value Taken Time   /95 10/11/23 0950   Temp     Pulse 62 10/11/23 0953   Resp 13 10/11/23 0953   SpO2 95 % 10/11/23 0940   Vitals shown include unfiled device data.    Electronically Signed By: Americo Magaña MD  October 11, 2023  11:51 AM

## 2023-10-11 NOTE — ANESTHESIA PREPROCEDURE EVALUATION
Anesthesia Pre-Procedure Evaluation    Patient: Joselito Padilla   MRN: [id number] : 1970        Procedure : Procedure(s):  Colonoscopy          Past Medical History:   Diagnosis Date    Achilles tendon pain 2013    Acute pulmonary embolism with acute cor pulmonale, unspecified pulmonary embolism type (H) 2023    ADHD     CARDIOVASCULAR SCREENING; LDL GOAL LESS THAN 130 2014    Depressive disorder     Hypertension goal BP (blood pressure) < 140/90 2013    Sleep apnea     Wears CPAP      Past Surgical History:   Procedure Laterality Date    COLONOSCOPY      DENTAL SURGERY      [place] teeth    heel surgery      bilateral    IR IVC FILTER PLACEMENT  09/10/2022    IR IVC FILTER REMOVAL  2022    IR PULMONARY ANGIOGRAM BILATERAL  09/10/2022    ORTHOPEDIC SURGERY      Removal of Francisco's deformity    VASCULAR SURGERY  2022    VASECTOMY  2007      No Known Allergies   Social History     Tobacco Use    Smoking status: Never    Smokeless tobacco: Never   Substance Use Topics    Alcohol use: No      Wt Readings from Last 1 Encounters:   10/03/23 114.3 kg (252 lb)        Anesthesia Evaluation   Pt has had prior anesthetic.     No history of anesthetic complications       ROS/MED HX  ENT/Pulmonary:     (+) sleep apnea, uses CPAP,                                     Neurologic:    (-) no CVA and no TIA   Cardiovascular:     (+)  hypertension- -   -  - -                                 Previous cardiac testing   Echo: Date: 2022 Results:  1. Normal biventricular size and function. Left ventricular ejection fraction  of 60-65%.  2. No segmental wall motion abnormalities noted.  3. No hemodynamically significant valvular disease.  4. Negative Bubble study.  Compared to the prior study on 2022, Rv size and function are normal  today.  ______________________________________________________________________________  Left Ventricle  The left ventricle is normal in size. There  is normal left ventricular wall  thickness. Left ventricular systolic function is normal. The visual ejection  fraction is 60-65%. Grade I or early diastolic dysfunction. No regional wall  motion abnormalities noted.     Right Ventricle  The right ventricle is normal in size and function.     Atria  Normal left atrial size. Right atrial size is normal. A contrast injection  (Bubble Study) was performed that was negative for flow across the interatrial  septum.     Mitral Valve  The mitral valve leaflets appear normal. There is no evidence of stenosis,  fluttering, or prolapse. There is trace mitral regurgitation.     Tricuspid Valve  Normal tricuspid valve. There is trace tricuspid regurgitation.     Aortic Valve  The aortic valve is trileaflet. No aortic stenosis is present.     Pulmonic Valve  Normal pulmonic valve. There is mild (1+) pulmonic valvular regurgitation.     Vessels  The aortic root is normal size. Normal size ascending aorta. IVC diameter <2.1  cm collapsing >50% with sniff suggests a normal RA pressure of 3 mmHg.     Pericardium  There is no pericardial effusion.     Rhythm  Sinus rhythm was noted.     ______________________________________________________________________________  MMode/2D Measurements & Calculations  IVSd: 1.1 cm  LVIDd: 4.8 cm  LVIDs: 3.0 cm  LVPWd: 0.83 cm  FS: 38.6 %  LV mass(C)d: 162.5 grams  LV mass(C)dI: 72.8 grams/m2  Ao root diam: 3.4 cm  LA dimension: 3.8 cm     asc Aorta Diam: 3.4 cm  LA/Ao: 1.1  RWT: 0.35     Doppler Measurements & Calculations  MV E max brandan: 61.6 cm/sec  MV A max brandan: 50.0 cm/sec  MV E/A: 1.2  MV dec slope: 218.7 cm/sec2  MV dec time: 0.28 sec  PA acc time: 0.09 sec  E/E' av.5  Lateral E/e': 4.7  Medial E/e': 8.2    Stress Test:  Date: Results:    ECG Reviewed:  Date: Results:    Cath:  Date: Results:   (-) CAD, pacemaker, stent, pacemaker and ICD   METS/Exercise Tolerance: >4 METS    Hematologic:  - neg hematologic  ROS     Musculoskeletal:  - neg  "musculoskeletal ROS     GI/Hepatic:    (-) GERD   Renal/Genitourinary:    (-) renal disease   Endo:     (+)               Obesity,    (-) Type II DM   Psychiatric/Substance Use:  - neg psychiatric ROS     Infectious Disease:  - neg infectious disease ROS     Malignancy:  - neg malignancy ROS     Other:  - neg other ROS          Physical Exam    Airway        Mallampati: I   TM distance: > 3 FB   Neck ROM: full   Mouth opening: > 3 cm    Respiratory Devices and Support         Dental       (+) Minor Abnormalities - some fillings, tiny chips      Cardiovascular          Rhythm and rate: regular and normal     Pulmonary           breath sounds clear to auscultation           OUTSIDE LABS:  CBC:   Lab Results   Component Value Date    WBC 4.0 11/04/2022    WBC 5.3 09/21/2022    HGB 15.7 10/03/2023    HGB 14.7 11/04/2022    HCT 44.6 11/04/2022    HCT 44.5 09/21/2022     11/04/2022     09/21/2022     BMP:   Lab Results   Component Value Date     10/03/2023     02/01/2023    POTASSIUM 4.0 10/03/2023    POTASSIUM 4.0 02/01/2023    CHLORIDE 101 10/03/2023    CHLORIDE 102 02/01/2023    CO2 27 10/03/2023    CO2 28 02/01/2023    BUN 13.6 10/03/2023    BUN 12.5 02/01/2023    CR 0.97 10/03/2023    CR 1.01 02/01/2023    GLC 92 10/03/2023    GLC 97 02/01/2023     COAGS:   Lab Results   Component Value Date    PTT 31 11/04/2022    INR 1.31 (H) 11/04/2022     POC: No results found for: \"BGM\", \"HCG\", \"HCGS\"  HEPATIC:   Lab Results   Component Value Date    ALBUMIN 4.4 02/01/2023    PROTTOTAL 7.2 02/01/2023    ALT 36 02/01/2023    AST 30 02/01/2023    ALKPHOS 68 02/01/2023    BILITOTAL 0.3 02/01/2023     OTHER:   Lab Results   Component Value Date    TIMI 9.7 10/03/2023    TSH 2.10 10/13/2014       Anesthesia Plan    ASA Status:  3       Anesthesia Type: MAC.     - Reason for MAC: chronic cardiopulmonary disease              Consents    Anesthesia Plan(s) and associated risks, benefits, and realistic " alternatives discussed. Questions answered and patient/representative(s) expressed understanding.     - Discussed: Risks, Benefits and Alternatives for BOTH SEDATION and the PROCEDURE were discussed     - Discussed with:  Patient            Postoperative Care    Pain management: IV analgesics.   PONV prophylaxis: Ondansetron (or other 5HT-3), Background Propofol Infusion     Comments:                Americo Magaña MD

## 2023-10-11 NOTE — ANESTHESIA CARE TRANSFER NOTE
Patient: Joselito Padilla    Procedure: Procedure(s):  Colonoscopy       Diagnosis: Special screening for malignant neoplasms, colon [Z12.11]  Diagnosis Additional Information: No value filed.    Anesthesia Type:   MAC     Note:    Oropharynx: oropharynx clear of all foreign objects and spontaneously breathing  Level of Consciousness: awake and drowsy  Oxygen Supplementation: room air    Independent Airway: airway patency satisfactory and stable  Dentition: dentition unchanged  Vital Signs Stable: post-procedure vital signs reviewed and stable  Report to RN Given: handoff report given  Patient transferred to: PACU  Comments: At end of procedure, spontaneous respirations, patient alert to voice, able to follow commands. Patient breathing room air at room air to PACU. SpO2, NiBP, and EKG monitors and alarms on and functioning, report on patient's clinical status given to PACU RN, RN questions answered.      Handoff Report: Identifed the Patient, Identified the Reponsible Provider, Reviewed the pertinent medical history, Discussed the surgical course, Reviewed Intra-OP anesthesia mangement and issues during anesthesia, Set expectations for post-procedure period and Allowed opportunity for questions and acknowledgement of understanding      Vitals:  Vitals Value Taken Time   /75 10/11/23 0921   Temp     Pulse 86 10/11/23 0922   Resp 14 10/11/23 0922   SpO2     Vitals shown include unfiled device data.    Electronically Signed By: SARAH Burrell CRNA  October 11, 2023  9:23 AM

## 2023-10-23 ENCOUNTER — HOSPITAL ENCOUNTER (OUTPATIENT)
Dept: CARDIOLOGY | Facility: CLINIC | Age: 53
Discharge: HOME | End: 2023-10-23
Attending: INTERNAL MEDICINE
Payer: COMMERCIAL

## 2023-10-23 VITALS — BODY MASS INDEX: 37.84 KG/M2 | HEIGHT: 69 IN | WEIGHT: 255.51 LBS

## 2023-10-23 DIAGNOSIS — I26.09 ACUTE PULMONARY EMBOLISM WITH ACUTE COR PULMONALE, UNSPECIFIED PULMONARY EMBOLISM TYPE (H): ICD-10-CM

## 2023-10-23 DIAGNOSIS — R06.02 SOB (SHORTNESS OF BREATH): ICD-10-CM

## 2023-10-23 LAB — LVEF ECHO: NORMAL

## 2023-10-23 PROCEDURE — 93306 TTE W/DOPPLER COMPLETE: CPT | Mod: 26 | Performed by: INTERNAL MEDICINE

## 2023-10-23 PROCEDURE — 93306 TTE W/DOPPLER COMPLETE: CPT

## 2023-10-23 PROCEDURE — 94621 CARDIOPULM EXERCISE TESTING: CPT

## 2023-10-23 PROCEDURE — 94621 CARDIOPULM EXERCISE TESTING: CPT | Mod: 26 | Performed by: INTERNAL MEDICINE

## 2023-10-24 LAB
CARDIOPULMONARY ANAEROBIC THRESHOLD PREDICTED PEAK: 71 %
CARDIOPULMONARY ANAEROBIC THRESHOLD VO2: 23.4 ML/KG/MIN
CARDIOPULMONARY BLOOD PRESSURE REST: NORMAL MMHG
CARDIOPULMONARY BREATHING RESERVE REST: 95.6
CARDIOPULMONARY BREATHING RESERVE V02MAX: 11
CARDIOPULMONARY CO2 OUTPUT REST: 231 ML/MIN
CARDIOPULMONARY CO2 OUTPUT VO2MAX: 4031 ML/MIN
CARDIOPULMONARY FEV 1.0 (L) ACTUAL: 4.3
CARDIOPULMONARY FEV 1.0 (L) PRECENT: 117 %
CARDIOPULMONARY FEV 1.0 (L) PREDICTED: 3.68
CARDIOPULMONARY FEV 1.0 FVC (%) ACTUAL: 78.8
CARDIOPULMONARY FEV 1.0 FVC (%) PERCENT: 102 %
CARDIOPULMONARY FEV 1.0 FVC (%) PREDICTED: 76.9
CARDIOPULMONARY FUNCTIONAL CAPACITY MAX ML/KG/MIN: 25.7 ML/KG/MIN
CARDIOPULMONARY FUNCTIONAL CAPACITY PERCENT: 78 %
CARDIOPULMONARY FUNCTIONAL CAPACITY PREDICTED: 32.8 ML/KG/MIN
CARDIOPULMONARY FVC (L) ACTUAL: 5.46
CARDIOPULMONARY FVC (L) PERCENT: 114 %
CARDIOPULMONARY FVC (L) PREDICTED: 4.79
CARDIOPULMONARY HEART RATE REST: 84 BPM
CARDIOPULMONARY MET'S REST: 0.8
CARDIOPULMONARY MINUTE VENTILATION REST: 6.6 L/MIN
CARDIOPULMONARY MINUTE VENTILATION VO2MAX: 135 L/MIN
CARDIOPULMONARY MYOCARDIAC O2 DEMAND MAX: NORMAL
CARDIOPULMONARY OXYGEN CONSUMPTION REST: 2.7 ML/KG/MIN
CARDIOPULMONARY OXYGEN CONSUMPTION VO2MAX: 25.7 ML/KG/MIN
CARDIOPULMONARY OXYGEN PULSE REST: 4 ML/BEAT
CARDIOPULMONARY OXYGEN PULSE VO2MAX: 16.1 ML/BEAT
CARDIOPULMONARY OXYGEN SATURATION- OXIMETRY REST: 99 %
CARDIOPULMONARY OXYGEN SATURATION- OXIMETRY VO2MAX: 98 %
CARDIOPULMONARY PET C02 REST: 41
CARDIOPULMONARY PET C02 VO2MAX: 33
CARDIOPULMONARY PET02 REST: 92
CARDIOPULMONARY PET02 V02 MAX: 118
CARDIOPULMONARY RER: 1.32
CARDIOPULMONARY RESPIRALORY EXCHANGE RATIO VO2MAX: 1.32
CARDIOPULMONARY RESPIRALORY EXCHANGE RATIO: 0.72
CARDIOPULMONARY RESPIRATORY RATE REST: 7 BR/MIN
CARDIOPULMONARY RESPIRATORY RATE VO2MAX: 45 BR/MIN
CARDIOPULMONARY STRESS BASE 1 BP MMHG: NORMAL MMHG
CARDIOPULMONARY STRESS BASE 1 BPA: 167 BPM
CARDIOPULMONARY STRESS BASE 1 SPO2: 99 % SPO2
CARDIOPULMONARY STRESS BASE 1 TIME: 1 MINS
CARDIOPULMONARY STRESS BASE 2 BP MMHG: NORMAL MMHG
CARDIOPULMONARY STRESS BASE 2 BPA: 138 BPM
CARDIOPULMONARY STRESS BASE 2 SPO2: 100 % SPO2
CARDIOPULMONARY STRESS BASE 2 TIME: 3 MINS
CARDIOPULMONARY STRESS BASE 3 BP MMHG: NORMAL MMHG
CARDIOPULMONARY STRESS BASE 3 BPA: 114 BPM
CARDIOPULMONARY STRESS BASE 3 SPO2: 100 % SPO2
CARDIOPULMONARY STRESS BASE 3 TIME: 7 MINS
CARDIOPULMONARY STRESS PHASE 1 BP MMHG: NORMAL MMHG
CARDIOPULMONARY STRESS PHASE 1 BPM: 119 BPM
CARDIOPULMONARY STRESS PHASE 1 SPO2: 96 % SPO2
CARDIOPULMONARY STRESS PHASE 1 TIME: 2 MINS
CARDIOPULMONARY STRESS PHASE 2 BP MMHG: NORMAL MMHG
CARDIOPULMONARY STRESS PHASE 2 BPM: 132 BPM
CARDIOPULMONARY STRESS PHASE 2 SPO2: 96 % SPO2
CARDIOPULMONARY STRESS PHASE 2 TIME: 4 MINS
CARDIOPULMONARY STRESS PHASE 3 BP MMHG: NORMAL MMHG
CARDIOPULMONARY STRESS PHASE 3 BPM: 141 BPM
CARDIOPULMONARY STRESS PHASE 3 SPO2: 96 % SPO2
CARDIOPULMONARY STRESS PHASE 3 TIME: 6 MINS
CARDIOPULMONARY STRESS PHASE 4 BP MMHG: NORMAL MMHG
CARDIOPULMONARY STRESS PHASE 4 BPM: 157 BPM
CARDIOPULMONARY STRESS PHASE 4 SPO2: 95 % SPO2
CARDIOPULMONARY STRESS PHASE 4 TIME: 8 MINS
CARDIOPULMONARY STRESS PHASE 5 BP MMHG: NORMAL MMHG
CARDIOPULMONARY STRESS PHASE 5 BPM: 169 BPM
CARDIOPULMONARY STRESS PHASE 5 SPO2: 97 % SPO2
CARDIOPULMONARY STRESS PHASE 5 TIME: 10 MINS
CARDIOPULMONARY STRESS PHASE 6 BP MMHG: NORMAL MMHG
CARDIOPULMONARY STRESS PHASE 6 BPA: 185 BPM
CARDIOPULMONARY STRESS PHASE 6 SPO2: 98 % SPO2
CARDIOPULMONARY STRESS PHASE 6 TIME SEC: 39 SEC
CARDIOPULMONARY STRESS PHASE 6 TIME: 11 MINS
CARDIOPULMONARY SVC (L) ACTUAL: 5.42
CARDIOPULMONARY SVC (L) PERCENT: 113 %
CARDIOPULMONARY SVC (L) PREDICTED: 4.79
CARDIOPULMONARY TIDAL VOLUME REST: 959 ML
CARDIOPULMONARY TIDAL VOLUME VO2MAX: 3020 ML
CARDIOPULMONARY VE/VCO2 SLOPE: 33.35
CARDIOPULMONARY VENTILATORY EQUIVALENT 02 REST: 21
CARDIOPULMONARY VENTILATORY EQUIVALENT 02 V02: 42
CARDIOPULMONARY VENTILATORY EQUIVALENT C02 REST: 29
CARDIOPULMONARY VENTILATORY EQUIVALENT C02 SLOPE VO2MAX: 33.35
CARDIOPULMONARY VENTILATORY EQUIVALENT C02 VO2MAX: 34
CV STRESS MAX HR HE: 185
PREDICTED VO2MAX: 32.8
RATED PERCEIVED EXERTION: 16
STRESS ANGINA INDEX: 0
STRESS ECHO BASELINE BP: NORMAL MMHG
STRESS ECHO BASELINE HR: 81 BPM
STRESS ECHO CALCULATED PERCENT HR: 111 %
STRESS ECHO LAST STRESS BP: NORMAL MMHG
STRESS ECHO POST ESTIMATED WORKLOAD: 7.3 METS
STRESS ECHO POST EXERCISE DUR MIN: 11 MIN
STRESS ECHO POST EXERCISE DUR SEC: 39 SEC
STRESS ECHO TARGET HR: 166

## 2023-10-31 ENCOUNTER — VIRTUAL VISIT (OUTPATIENT)
Dept: OTHER | Facility: CLINIC | Age: 53
End: 2023-10-31
Attending: INTERNAL MEDICINE
Payer: COMMERCIAL

## 2023-10-31 DIAGNOSIS — I82.411 ACUTE DEEP VEIN THROMBOSIS (DVT) OF FEMORAL VEIN OF RIGHT LOWER EXTREMITY (H): ICD-10-CM

## 2023-10-31 DIAGNOSIS — G47.33 OSA (OBSTRUCTIVE SLEEP APNEA): ICD-10-CM

## 2023-10-31 DIAGNOSIS — Z95.828 S/P IVC FILTER: ICD-10-CM

## 2023-10-31 DIAGNOSIS — I10 BENIGN ESSENTIAL HYPERTENSION: ICD-10-CM

## 2023-10-31 DIAGNOSIS — I26.09 ACUTE PULMONARY EMBOLISM WITH ACUTE COR PULMONALE, UNSPECIFIED PULMONARY EMBOLISM TYPE (H): Primary | ICD-10-CM

## 2023-10-31 DIAGNOSIS — I83.893 VARICOSE VEINS OF BILATERAL LOWER EXTREMITIES WITH OTHER COMPLICATIONS: ICD-10-CM

## 2023-10-31 DIAGNOSIS — I10 HYPERTENSION GOAL BP (BLOOD PRESSURE) < 140/90: ICD-10-CM

## 2023-10-31 DIAGNOSIS — R06.02 SOB (SHORTNESS OF BREATH): ICD-10-CM

## 2023-10-31 PROCEDURE — 99443 PR PHYSICIAN TELEPHONE EVALUATION 21-30 MIN: CPT | Performed by: INTERNAL MEDICINE

## 2023-10-31 NOTE — PATIENT INSTRUCTIONS
Recent echocardiogram looks good and cardiopulmonary stress test unremarkable    Continue Xarelto 10 mg daily and take with food    Lose weight    Use CPAP machine    Follow-up with me in 6 months

## 2023-10-31 NOTE — PROGRESS NOTES
Sea is a 53 year old who is being evaluated via a billable telephone visit.      What phone number would you like to be contacted at? [phone]  How would you like to obtain your AVS? Shawna    Distant Location (provider location):  On-site      Objective         Vitals:  No vitals were obtained today due to virtual visit.    MICHELLE BOONE    Provider visit note::        CC:   Follow-up visit  Review of recent studies of echocardiogram and cardiopulmonary stress test  Exertional SOB , slightly improved since last visit  Underwent colonoscopy recently which is normal  IVC filter was removed in first week of November 2022  Taking Xarelto 10 mg and tolerating   He was initially seen on September 21, 2022 for Evaluation and management of first lifetime thromboembolic event extensive bilateral PE and right lower extremity DVT developed few weeks after COVID infection followed by 5-hour airplane trip to [place] underwent to bilateral pulmonary thrombectomy and IVC filter placement on September 10, 2022  HPI:  Joselito Padilla is a 53 year old very pleasant male with history of obstructive sleep apnea uses CPAP machine, hypertension well-controlled with medications, depression developed COVID infection in second week of August and recovered from it then followed by last week of August travel to [place] by airplane then developed worsening shortness of breath and leg swelling admitted to the hospital work-up revealed extensive bilateral PE with right heart strain and also acute occlusive DVT in the right distal femoral popliteal and below-knee veins.  Admission echocardiogram right heart strain with moderate dilatation of right ventricle and right ventricular dysfunction. he was initially admitted to the [facility] transferred to the [facility] underwent successful mechanical pulmonary thrombectomy and PA pressures improved.  He was initiated IV heparin in the hospital then switched to the Xarelto currently  "taking 15 mg twice a day for 21 days then followed by 20 mg daily     Clinically doing well no chest pain but he does have some exertional shortness of breath    Underwent echo which was unremarkable and cardiopulmonary stress test was unremarkable results as delineated above   using  compression stockings  No personal history of malignancy  No family history of first-degree relatives hypercoagulable status of DVT or PE  He is obese with KYLER waiting to get  CPAP         Review of systems: Reviewed all 12 point review of systems as per HPI otherwise unremarkable    Physical exam:( no physical exam done this is virtual visit)    Reviewed recent laboratory tests, imaging studies in the epic and updated chart    ECHO 10/23/23   \"Interpretation Summary  Global and regional left ventricular function is normal with an EF of 60-65%.  Right ventricular function, chamber size, wall motion, and thickness are  normal.  Pulmonary artery systolic pressure cannot be assessed.  The inferior vena cava is normal.  No pericardial effusion is present.\"    Cardiopulmonary stress test 10/23/2023:    \"Conclusion         A cardiopulmonary stress test was performed following a Ten ramp protocol with the patient exercising for 11 minutes and 39 seconds under the supervision of Dr. Marquez.  Blood pressure and heart rate demonstrated a normal response to exercise.    The stress electrocardiogram is negative for inducible ischemic EKG changes.    There is no prior study for comparison.    The patient's exercise capacity is mildly impaired.     The patient achieved a mildly impaired, class I functional capacity with a cardiac limitation to exercise. A cardiac limitation is suggested given the low peak VO2 and oxygen pulse.\"       Assessment and plan:    (I26.09)  HX Bilateral pulmonary embolism with acute cor pulmonale,  (H) 9/10/2022 s/p pulmonary thrombectomy First TE event provoked  (primary encounter diagnosis)  Exertional SOB, normal echo " and unremarkable cardiopulmonary stress test in October 2023  (I82.411) Hx of deep vein thrombosis (DVT) of femoral vein of right lower extremity (H)  (Z95.828) S/P IVC filter 9/10/2022, removed in 11/2022  (G47.33) KYLER (obstructive sleep apnea)  (U07.1) Infection due to 2019 novel coronavirus Aug 15th 2022 , recovered.  (I10) Benign essential hypertension  (I83.893) Varicose veins of bilateral lower extremities with other complications, CEAP 4 CVI     This is a very pleasant 53-year-old male developed first lifetime thromboembolic event appears provoked  with extensive bilateral PE right heart strain and also right lower extremity DVT underwent successful mechanical pulmonary thrombectomy and PA pressures improved and also underwent IVC filter placement then removal.  Currently on Xarelto tolerating without any problems  No personal history of malignancy no family history of first-degree relatives with DVT or PE  He is non-smoker  History of KYLER   Since this is a first lifetime event provoked no need for hypercoagulable studies almost completed 6 months of xarelto 20 mg currently taking 10 mg      At present my recommendations,  Continue   Xarelto dose to 10 mg daily   Reviewed  ECHO and Cardiopulmonary stress test results  Utilize compression stockings thigh-high 20 to 30 mmHg closed toes during the daytime new prescription given  Continue rest of the medications same  Consider getting repeat lipid panel through primary MD if it is still elevated consider statin goal of LDL less than 70    Phone visit start time: 4:22 PM    Phone visit end time: 4:43 PM    Location of the patient: At his home in [place]    Location of provider: [facility]    30 minutes spent on the date of the encounter doing chart review, review of recent imaging studies, colonoscopy report, previous evaluation history, documentation and addressed above-mentioned multiple issues    AVS with written instructions  done    Follow-up in 6 months virtual or office visit    Copy to primary MD      This visit is being conducted as a virtual visit due to the emphasis on mitigation of the COVID-19 virus pandemic. The clinician has decided that the risk of an in-office visit outweighs the benefit for this patient.      Brandy Vaughn MD, FAYOSI, FSVM, FNLA, FACP  Vascular Medicine

## 2024-01-09 ENCOUNTER — PATIENT OUTREACH (OUTPATIENT)
Dept: CARE COORDINATION | Facility: CLINIC | Age: 54
End: 2024-01-09
Payer: COMMERCIAL

## 2024-02-07 DIAGNOSIS — I10 ESSENTIAL HYPERTENSION: ICD-10-CM

## 2024-02-07 DIAGNOSIS — I26.09 ACUTE PULMONARY EMBOLISM WITH ACUTE COR PULMONALE, UNSPECIFIED PULMONARY EMBOLISM TYPE (H): ICD-10-CM

## 2024-02-07 DIAGNOSIS — I82.411 ACUTE DEEP VEIN THROMBOSIS (DVT) OF FEMORAL VEIN OF RIGHT LOWER EXTREMITY (H): ICD-10-CM

## 2024-02-07 RX ORDER — LISINOPRIL 10 MG/1
10 TABLET ORAL DAILY
Qty: 90 TABLET | Refills: 0 | Status: SHIPPED | OUTPATIENT
Start: 2024-02-07 | End: 2024-03-14

## 2024-02-07 RX ORDER — RIVAROXABAN 10 MG/1
10 TABLET, FILM COATED ORAL DAILY
Qty: 90 TABLET | Refills: 1 | Status: SHIPPED | OUTPATIENT
Start: 2024-02-07 | End: 2024-08-19

## 2024-02-07 RX ORDER — HYDROCHLOROTHIAZIDE 12.5 MG/1
12.5 TABLET ORAL DAILY
Qty: 90 TABLET | Refills: 0 | Status: SHIPPED | OUTPATIENT
Start: 2024-02-07 | End: 2024-03-14

## 2024-02-07 NOTE — TELEPHONE ENCOUNTER
rivaroxaban ANTICOAGULANT (XARELTO) 10 MG TABS tablet     Last Written Prescription Date:  3/3/23  Last Fill Quantity: 90,  # refills: 3    Last visit with provider:  10/13/23  Follow up recommended:  5/1/24      Unable to fill per [facility] protocol.  Medication and pharmacy loaded.

## 2024-03-13 SDOH — HEALTH STABILITY: PHYSICAL HEALTH: ON AVERAGE, HOW MANY MINUTES DO YOU ENGAGE IN EXERCISE AT THIS LEVEL?: 40 MIN

## 2024-03-13 SDOH — HEALTH STABILITY: PHYSICAL HEALTH: ON AVERAGE, HOW MANY DAYS PER WEEK DO YOU ENGAGE IN MODERATE TO STRENUOUS EXERCISE (LIKE A BRISK WALK)?: 3 DAYS

## 2024-03-13 ASSESSMENT — SOCIAL DETERMINANTS OF HEALTH (SDOH): HOW OFTEN DO YOU GET TOGETHER WITH FRIENDS OR RELATIVES?: ONCE A WEEK

## 2024-03-14 ENCOUNTER — OFFICE VISIT (OUTPATIENT)
Dept: FAMILY MEDICINE | Facility: CLINIC | Age: 54
End: 2024-03-14
Payer: COMMERCIAL

## 2024-03-14 VITALS
SYSTOLIC BLOOD PRESSURE: 128 MMHG | OXYGEN SATURATION: 98 % | BODY MASS INDEX: 38.23 KG/M2 | RESPIRATION RATE: 17 BRPM | DIASTOLIC BLOOD PRESSURE: 89 MMHG | HEIGHT: 69 IN | TEMPERATURE: 97.3 F | WEIGHT: 258.1 LBS | HEART RATE: 72 BPM

## 2024-03-14 DIAGNOSIS — I26.99 ACUTE PULMONARY EMBOLISM, UNSPECIFIED PULMONARY EMBOLISM TYPE, UNSPECIFIED WHETHER ACUTE COR PULMONALE PRESENT (H): ICD-10-CM

## 2024-03-14 DIAGNOSIS — E78.5 HYPERLIPIDEMIA LDL GOAL <160: ICD-10-CM

## 2024-03-14 DIAGNOSIS — I10 ESSENTIAL HYPERTENSION: ICD-10-CM

## 2024-03-14 DIAGNOSIS — Z00.00 ROUTINE GENERAL MEDICAL EXAMINATION AT A HEALTH CARE FACILITY: Primary | ICD-10-CM

## 2024-03-14 DIAGNOSIS — Z12.5 SCREENING FOR PROSTATE CANCER: ICD-10-CM

## 2024-03-14 DIAGNOSIS — F33.40 RECURRENT MAJOR DEPRESSIVE DISORDER, IN REMISSION (H): ICD-10-CM

## 2024-03-14 DIAGNOSIS — E66.01 MORBID OBESITY (H): ICD-10-CM

## 2024-03-14 LAB
ALBUMIN SERPL BCG-MCNC: 4.7 G/DL (ref 3.5–5.2)
ALP SERPL-CCNC: 69 U/L (ref 40–150)
ALT SERPL W P-5'-P-CCNC: 45 U/L (ref 0–70)
ANION GAP SERPL CALCULATED.3IONS-SCNC: 10 MMOL/L (ref 7–15)
AST SERPL W P-5'-P-CCNC: 31 U/L (ref 0–45)
BILIRUB SERPL-MCNC: 0.3 MG/DL
BUN SERPL-MCNC: 16.9 MG/DL (ref 6–20)
CALCIUM SERPL-MCNC: 9.5 MG/DL (ref 8.6–10)
CHLORIDE SERPL-SCNC: 103 MMOL/L (ref 98–107)
CHOLEST SERPL-MCNC: 193 MG/DL
CREAT SERPL-MCNC: 1.05 MG/DL (ref 0.67–1.17)
DEPRECATED HCO3 PLAS-SCNC: 29 MMOL/L (ref 22–29)
EGFRCR SERPLBLD CKD-EPI 2021: 84 ML/MIN/1.73M2
ERYTHROCYTE [DISTWIDTH] IN BLOOD BY AUTOMATED COUNT: 12.8 % (ref 10–15)
FASTING STATUS PATIENT QL REPORTED: YES
GLUCOSE SERPL-MCNC: 115 MG/DL (ref 70–99)
HCT VFR BLD AUTO: 48.4 % (ref 40–53)
HDLC SERPL-MCNC: 46 MG/DL
HGB BLD-MCNC: 16 G/DL (ref 13.3–17.7)
LDLC SERPL CALC-MCNC: 123 MG/DL
MCH RBC QN AUTO: 28.2 PG (ref 26.5–33)
MCHC RBC AUTO-ENTMCNC: 33.1 G/DL (ref 31.5–36.5)
MCV RBC AUTO: 85 FL (ref 78–100)
NONHDLC SERPL-MCNC: 147 MG/DL
PLATELET # BLD AUTO: 250 10E3/UL (ref 150–450)
POTASSIUM SERPL-SCNC: 3.7 MMOL/L (ref 3.4–5.3)
PROT SERPL-MCNC: 7.5 G/DL (ref 6.4–8.3)
PSA SERPL DL<=0.01 NG/ML-MCNC: 1.95 NG/ML (ref 0–3.5)
RBC # BLD AUTO: 5.68 10E6/UL (ref 4.4–5.9)
SODIUM SERPL-SCNC: 142 MMOL/L (ref 135–145)
TRIGL SERPL-MCNC: 120 MG/DL
WBC # BLD AUTO: 3.8 10E3/UL (ref 4–11)

## 2024-03-14 PROCEDURE — 36415 COLL VENOUS BLD VENIPUNCTURE: CPT | Performed by: FAMILY MEDICINE

## 2024-03-14 PROCEDURE — 90471 IMMUNIZATION ADMIN: CPT | Performed by: FAMILY MEDICINE

## 2024-03-14 PROCEDURE — 80053 COMPREHEN METABOLIC PANEL: CPT | Performed by: FAMILY MEDICINE

## 2024-03-14 PROCEDURE — G0103 PSA SCREENING: HCPCS | Performed by: FAMILY MEDICINE

## 2024-03-14 PROCEDURE — 80061 LIPID PANEL: CPT | Performed by: FAMILY MEDICINE

## 2024-03-14 PROCEDURE — 85027 COMPLETE CBC AUTOMATED: CPT | Performed by: FAMILY MEDICINE

## 2024-03-14 PROCEDURE — 99214 OFFICE O/P EST MOD 30 MIN: CPT | Mod: 25 | Performed by: FAMILY MEDICINE

## 2024-03-14 PROCEDURE — 99396 PREV VISIT EST AGE 40-64: CPT | Mod: 25 | Performed by: FAMILY MEDICINE

## 2024-03-14 PROCEDURE — 90715 TDAP VACCINE 7 YRS/> IM: CPT | Performed by: FAMILY MEDICINE

## 2024-03-14 RX ORDER — LISINOPRIL 10 MG/1
10 TABLET ORAL DAILY
Qty: 90 TABLET | Refills: 3 | Status: SHIPPED | OUTPATIENT
Start: 2024-03-14

## 2024-03-14 RX ORDER — HYDROCHLOROTHIAZIDE 12.5 MG/1
12.5 TABLET ORAL DAILY
Qty: 90 TABLET | Refills: 3 | Status: SHIPPED | OUTPATIENT
Start: 2024-03-14

## 2024-03-14 ASSESSMENT — PAIN SCALES - GENERAL: PAINLEVEL: NO PAIN (0)

## 2024-03-14 ASSESSMENT — PATIENT HEALTH QUESTIONNAIRE - PHQ9: SUM OF ALL RESPONSES TO PHQ QUESTIONS 1-9: 0

## 2024-03-14 NOTE — PATIENT INSTRUCTIONS
Preventive Care Advice   This is general advice given by our system to help you stay healthy. However, your care team may have specific advice just for you. Please talk to your care team about your preventive care needs.  Nutrition  Eat 5 or more servings of fruits and vegetables each day.  Try wheat bread, brown rice and whole grain pasta (instead of white bread, rice, and pasta).  Get enough calcium and vitamin D. Check the label on foods and aim for 100% of the RDA (recommended daily allowance).  Lifestyle  Exercise at least 150 minutes each week   (30 minutes a day, 5 days a week).  Do muscle strengthening activities 2 days a week. These help control your weight and prevent disease.  No smoking.  Wear sunscreen to prevent skin cancer.  Have a dental exam and cleaning every 6 months.  Yearly exams  See your health care team every year to talk about:  Any changes in your health.  Any medicines your care team has prescribed.  Preventive care, family planning, and ways to prevent chronic diseases.  Shots (vaccines)   HPV shots (up to age 26), if you've never had them before.  Hepatitis B shots (up to age 59), if you've never had them before.  COVID-19 shot: Get this shot when it's due.  Flu shot: Get a flu shot every year.  Tetanus shot: Get a tetanus shot every 10 years.  Pneumococcal, hepatitis A, and RSV shots: Ask your care team if you need these based on your risk.  Shingles shot (for age 50 and up).  General health tests  Diabetes screening:  Starting at age 35, Get screened for diabetes at least every 3 years.  If you are younger than age 35, ask your care team if you should be screened for diabetes.  Cholesterol test: At age 39, start having a cholesterol test every 5 years, or more often if advised.  Bone density scan (DEXA): At age 50, ask your care team if you should have this scan for osteoporosis (brittle bones).  Hepatitis C: Get tested at least once in your life.  STIs (sexually transmitted  infections)  Before age 24: Ask your care team if you should be screened for STIs.  After age 24: Get screened for STIs if you're at risk. You are at risk for STIs (including HIV) if:  You are sexually active with more than one person.  You don't use condoms every time.  You or a partner was diagnosed with a sexually transmitted infection.  If you are at risk for HIV, ask about PrEP medicine to prevent HIV.  Get tested for HIV at least once in your life, whether you are at risk for HIV or not.  Cancer screening tests  Cervical cancer screening: If you have a cervix, begin getting regular cervical cancer screening tests at age 21. Most people who have regular screenings with normal results can stop after age 65. Talk about this with your provider.  Breast cancer scan (mammogram): If you've ever had breasts, begin having regular mammograms starting at age 40. This is a scan to check for breast cancer.  Colon cancer screening: It is important to start screening for colon cancer at age 45.  Have a colonoscopy test every 10 years (or more often if you're at risk) Or, ask your provider about stool tests like a FIT test every year or Cologuard test every 3 years.  To learn more about your testing options, visit: https://www.[email or web address]/790055.pdf.  For help making a decision, visit: https://bit.ly/qs88121.  Prostate cancer screening test: If you have a prostate and are age 55 to 69, ask your provider if you would benefit from a yearly prostate cancer screening test.  Lung cancer screening: If you are a current or former smoker age 50 to 80, ask your care team if ongoing lung cancer screenings are right for you.  For informational purposes only. Not to replace the advice of your health care provider. Copyright   2023 [employer]. All rights reserved. Clinically reviewed by the [facility] Transitions Program. Mis Descuentos [id number] - REV 01/24.

## 2024-03-14 NOTE — PROGRESS NOTES
"Preventive Care Visit  [facility] MAI Maldonado MD, Family Medicine  Mar 14, 2024      Assessment & Plan     Routine general medical examination at a health care facility    - CBC with Platelets; Future  - Lipid panel reflex to direct LDL Non-fasting; Future  - Comprehensive metabolic panel (BMP + Alb, Alk Phos, ALT, AST, Total. Bili, TP); Future  - Prostate Specific Antigen Screen; Future  - CBC with Platelets  - Lipid panel reflex to direct LDL Non-fasting  - Comprehensive metabolic panel (BMP + Alb, Alk Phos, ALT, AST, Total. Bili, TP)  - Prostate Specific Antigen Screen    Essential hypertension    - lisinopril (ZESTRIL) 10 MG tablet; Take 1 tablet (10 mg) by mouth daily  - hydroCHLOROthiazide 12.5 MG tablet; Take 1 tablet (12.5 mg) by mouth daily  - Comprehensive metabolic panel (BMP + Alb, Alk Phos, ALT, AST, Total. Bili, TP); Future  - Comprehensive metabolic panel (BMP + Alb, Alk Phos, ALT, AST, Total. Bili, TP)    Morbid obesity (H)      Acute pulmonary embolism, unspecified pulmonary embolism type, unspecified whether acute cor pulmonale present (H)    On xeralto.  Hyperlipidemia LDL goal <160    - Lipid panel reflex to direct LDL Non-fasting; Future  - Comprehensive metabolic panel (BMP + Alb, Alk Phos, ALT, AST, Total. Bili, TP); Future  - Lipid panel reflex to direct LDL Non-fasting  - Comprehensive metabolic panel (BMP + Alb, Alk Phos, ALT, AST, Total. Bili, TP)    Recurrent major depressive disorder, in remission (H24)  Stable, sees psych    Screening for prostate cancer    - Prostate Specific Antigen Screen; Future  - Prostate Specific Antigen Screen        BMI  Estimated body mass index is 38.23 kg/m  as calculated from the following:    Height as of this encounter: 1.75 m (5' 8.9\").    Weight as of this encounter: 117.1 kg (258 lb 1.6 oz).       Counseling  Appropriate preventive services were discussed with this patient, including applicable screening as appropriate for " fall prevention, nutrition, physical activity, Tobacco-use cessation, weight loss and cognition.  Checklist reviewing preventive services available has been given to the patient.  Reviewed patient's diet, addressing concerns and/or questions.   He is at risk for lack of exercise and has been provided with information to increase physical activity for the benefit of his well-being.           Subjective   Sea is a 53 year old, presenting for the following:  Physical (Fasting.)        3/14/2024     7:11 AM   Additional Questions   Roomed by Kinjal PAULINO        Health Care Directive  Patient has a Health Care Directive on file  Advance care planning document is on file and is current.    Healthy Habits:     Getting at least 3 servings of Calcium per day:  Yes    Bi-annual eye exam:  Yes    Dental care twice a year:  Yes    Sleep apnea or symptoms of sleep apnea:  None    Diet:  Regular (no restrictions)    Frequency of exercise:  2-3 days/week    Duration of exercise:  30-45 minutes    Taking medications regularly:  Yes    Barriers to taking medications:  None    Medication side effects:  None    Additional concerns today:  No      Patient has underlying history of high blood pressure currently on lisinopril hydrochlorothiazide well-controlled.  History of pulmonary embolism.  Currently on Xarelto he is planning to see vascular and see if this medication needs to be used for long-term.  He told me prior to that he had COVID and he has long travel.  Depression symptoms are well-controlled currently on Abilify and Pristiq.  Also taking Strattera.      3/13/2024   General Health   How would you rate your overall physical health? Good   Feel stress (tense, anxious, or unable to sleep) Not at all         3/13/2024   Nutrition   Three or more servings of calcium each day? Yes   Diet: Regular (no restrictions)   How many servings of fruit and vegetables per day? (!) 2-3   How many sweetened beverages each day? 0-1          3/13/2024   Exercise   Days per week of moderate/strenous exercise 3 days   Average minutes spent exercising at this level 40 min         3/13/2024   Social Factors   Frequency of gathering with friends or relatives Once a week   Worry food won't last until get money to buy more No   Food not last or not have enough money for food? No   Do you have housing?  Yes   Are you worried about losing your housing? No   Lack of transportation? No   Unable to get utilities (heat,electricity)? No         3/13/2024   Fall Risk   Fallen 2 or more times in the past year? No   Trouble with walking or balance? No          3/13/2024   Dental   Dentist two times every year? Yes         3/13/2024   TB Screening   Were you born outside of [place]?  No       Today's PHQ-9 Score:       3/14/2024     7:13 AM   PHQ-9 SCORE   PHQ-9 Total Score 0         3/13/2024   Substance Use   Alcohol more than 3/day or more than 7/wk Not Applicable   Do you use any other substances recreationally? No     Social History     Tobacco Use    Smoking status: Never    Smokeless tobacco: Never   Vaping Use    Vaping Use: Never used   Substance Use Topics    Alcohol use: No    Drug use: No           3/13/2024   STI Screening   New sexual partner(s) since last STI/HIV test? No   ASCVD Risk   The 10-year ASCVD risk score (Ayah RYAN, et al., 2019) is: 5.9%    Values used to calculate the score:      Age: 53 years      Sex: Male      Is Non- : No      Diabetic: No      Tobacco smoker: No      Systolic Blood Pressure: 128 mmHg      Is BP treated: Yes      HDL Cholesterol: 47 mg/dL      Total Cholesterol: 188 mg/dL           Reviewed and updated as needed this visit by Provider                          Review of Systems  CONSTITUTIONAL: NEGATIVE for fever, chills, change in weight  INTEGUMENTARY/SKIN: NEGATIVE for worrisome rashes, moles or lesions  EYES: NEGATIVE for vision changes or irritation  ENT/MOUTH: NEGATIVE for ear, mouth and  "throat problems  RESP: NEGATIVE for significant cough or SOB  BREAST: NEGATIVE for masses, tenderness or discharge  CV: NEGATIVE for chest pain, palpitations or peripheral edema  GI: NEGATIVE for nausea, abdominal pain, heartburn, or change in bowel habits  : NEGATIVE for frequency, dysuria, or hematuria  MUSCULOSKELETAL: NEGATIVE for significant arthralgias or myalgia  NEURO: NEGATIVE for weakness, dizziness or paresthesias  ENDOCRINE: NEGATIVE for temperature intolerance, skin/hair changes  HEME: NEGATIVE for bleeding problems  PSYCHIATRIC: NEGATIVE for changes in mood or affect     Objective    Exam  /89   Pulse 72   Temp 97.3  F (36.3  C) (Temporal)   Resp 17   Ht 1.75 m (5' 8.9\")   Wt 117.1 kg (258 lb 1.6 oz)   SpO2 98%   BMI 38.23 kg/m     Estimated body mass index is 38.23 kg/m  as calculated from the following:    Height as of this encounter: 1.75 m (5' 8.9\").    Weight as of this encounter: 117.1 kg (258 lb 1.6 oz).    Physical Exam  GENERAL: alert and no distress  EYES: Eyes grossly normal to inspection, PERRL and conjunctivae and sclerae normal  HENT: ear canals and TM's normal, nose and mouth without ulcers or lesions  NECK: no adenopathy, no asymmetry, masses, or scars  RESP: lungs clear to auscultation - no rales, rhonchi or wheezes  CV: regular rate and rhythm, normal S1 S2, no S3 or S4, no murmur, click or rub, no peripheral edema  ABDOMEN: soft, nontender, no hepatosplenomegaly, no masses and bowel sounds normal  MS: no gross musculoskeletal defects noted, no edema  SKIN: no suspicious lesions or rashes  NEURO: Normal strength and tone, mentation intact and speech normal  PSYCH: mentation appears normal, affect normal/bright        Signed Electronically by: Bhaskar Maldonado MD    "

## 2024-05-01 ENCOUNTER — VIRTUAL VISIT (OUTPATIENT)
Dept: OTHER | Facility: CLINIC | Age: 54
End: 2024-05-01
Attending: INTERNAL MEDICINE
Payer: COMMERCIAL

## 2024-05-01 DIAGNOSIS — I10 BENIGN ESSENTIAL HYPERTENSION: ICD-10-CM

## 2024-05-01 DIAGNOSIS — Z95.828 S/P IVC FILTER: ICD-10-CM

## 2024-05-01 DIAGNOSIS — I26.09 ACUTE PULMONARY EMBOLISM WITH ACUTE COR PULMONALE, UNSPECIFIED PULMONARY EMBOLISM TYPE (H): Primary | ICD-10-CM

## 2024-05-01 DIAGNOSIS — R06.02 SOB (SHORTNESS OF BREATH): ICD-10-CM

## 2024-05-01 DIAGNOSIS — G47.33 OSA (OBSTRUCTIVE SLEEP APNEA): ICD-10-CM

## 2024-05-01 DIAGNOSIS — I82.411 ACUTE DEEP VEIN THROMBOSIS (DVT) OF FEMORAL VEIN OF RIGHT LOWER EXTREMITY (H): ICD-10-CM

## 2024-05-01 DIAGNOSIS — I83.893 VARICOSE VEINS OF BILATERAL LOWER EXTREMITIES WITH OTHER COMPLICATIONS: ICD-10-CM

## 2024-05-01 PROCEDURE — 99443 PR PHYSICIAN TELEPHONE EVALUATION 21-30 MIN: CPT | Performed by: INTERNAL MEDICINE

## 2024-05-01 NOTE — PATIENT INSTRUCTIONS
As we discussed on the phone today continue Xarelto 10 mg daily and take with the food    Utilize compression stockings during the daytime and elevate the legs when able    Follow the low-cholesterol diet and repeat fasting lipids through primary care physician's office in few months if it is still elevated consider lipid-lowering medication    Follow-up with me in 6 months virtual or office

## 2024-05-01 NOTE — PROGRESS NOTES
Sea is a 53 year old who is being evaluated via a billable telephone visit.    What phone number would you like to be contacted at? [phone]  How would you like to obtain your AVS? MyChart  Originating Location (pt. Location): Home    Distant Location (provider location):  On-site    Subjective   Sea is a 53 year old, presenting for the following health issues:  Telephone ([phone]/Follow-up to 10/31/23 /)        Objective       Vitals:  No vitals were obtained today due to virtual visit.    MICHELLE Osei is a 53 year old who is being evaluated via a billable telephone visit.      What phone number would you like to be contacted at? [phone]  How would you like to obtain your AVS? MyChart    Distant Location (provider location):  On-site      Objective         Vitals:  No vitals were obtained today due to virtual visit.    MICHELLE BOONE    Provider visit note::        CC:   Follow-up visit  Review of recent studies of echocardiogram and cardiopulmonary stress test  Exertional SOB , significantly improved since last visit  Underwent colonoscopy few months ago which is normal  IVC filter was removed in first week of November 2022  Taking Xarelto 10 mg and tolerating   Planning a trip to [place] leaving tomorrow  He was initially seen on September 21, 2022 for Evaluation and management of first lifetime thromboembolic event extensive bilateral PE and right lower extremity DVT developed few weeks after COVID infection followed by 5-hour airplane trip to [place] underwent to bilateral pulmonary thrombectomy and IVC filter placement on September 10, 2022  HPI:  Joselito Padilla is a 53 year old very pleasant male with history of obstructive sleep apnea uses CPAP machine, hypertension well-controlled with medications, depression developed COVID infection in second week of August and recovered from it then followed by last week of August travel to [place] by airplane then developed worsening shortness of breath  "and leg swelling admitted to the hospital work-up revealed extensive bilateral PE with right heart strain and also acute occlusive DVT in the right distal femoral popliteal and below-knee veins.  Admission echocardiogram right heart strain with moderate dilatation of right ventricle and right ventricular dysfunction. he was initially admitted to the [facility] transferred to the [facility] underwent successful mechanical pulmonary thrombectomy and PA pressures improved.  He was initiated IV heparin in the hospital then switched to the Xarelto currently taking 10 mg daily maintenance dose     Clinically doing better since last visit    Underwent echo which was unremarkable and cardiopulmonary stress test was unremarkable results as delineated above   using  compression stockings  No personal history of malignancy  No family history of first-degree relatives hypercoagulable status of DVT or PE  He is obese with KYLER waiting to get  CPAP  Hyperlipidemia on diet management         Review of systems: Reviewed all 12 point review of systems as per HPI otherwise unremarkable    Physical exam:( no physical exam done this is virtual visit)    Reviewed recent laboratory tests, imaging studies in the epic and updated chart    ECHO 10/23/23   \"Interpretation Summary  Global and regional left ventricular function is normal with an EF of 60-65%.  Right ventricular function, chamber size, wall motion, and thickness are  normal.  Pulmonary artery systolic pressure cannot be assessed.  The inferior vena cava is normal.  No pericardial effusion is present.\"    Cardiopulmonary stress test 10/23/2023:    \"Conclusion         A cardiopulmonary stress test was performed following a Ten ramp protocol with the patient exercising for 11 minutes and 39 seconds under the supervision of Dr. Marquez.  Blood pressure and heart rate demonstrated a normal response to exercise.    The stress electrocardiogram is negative for inducible " "ischemic EKG changes.    There is no prior study for comparison.    The patient's exercise capacity is mildly impaired.     The patient achieved a mildly impaired, class I functional capacity with a cardiac limitation to exercise. A cardiac limitation is suggested given the low peak VO2 and oxygen pulse.\"       Assessment and plan:    (I26.09)  HX Bilateral pulmonary embolism with acute cor pulmonale,  (H) 9/10/2022 s/p pulmonary thrombectomy First TE event provoked  (primary encounter diagnosis)  Exertional SOB, normal echo and unremarkable cardiopulmonary stress test in October 2023  (I82.411) Hx of deep vein thrombosis (DVT) of femoral vein of right lower extremity (H)  (Z95.828) S/P IVC filter 9/10/2022, removed in 11/2022  (G47.33) KYLER (obstructive sleep apnea)  (U07.1) Infection due to 2019 novel coronavirus Aug 15th 2022 , recovered.  (I10) Benign essential hypertension  (I83.893) Varicose veins of bilateral lower extremities with other complications, CEAP 4 CVI     This is a very pleasant 53-year-old male developed first lifetime thromboembolic event appears provoked  with extensive bilateral PE right heart strain and also right lower extremity DVT underwent successful mechanical pulmonary thrombectomy and PA pressures improved and also underwent IVC filter placement then removal.  Currently on Xarelto tolerating without any problems  No personal history of malignancy no family history of first-degree relatives with DVT or PE  He is non-smoker  History of KYLER   Since this is a first lifetime event provoked no need for hypercoagulable studies almost completed 6 months of xarelto 20 mg currently taking 10 mg      At present my recommendations,  Continue   Xarelto dose to 10 mg daily   Reviewed previous ECHO and Cardiopulmonary stress test results  Utilize compression stockings thigh-high 20 to 30 mmHg closed toes during the daytime and elevate the legs  Continue rest of the medications same  Goal of LDL less " than 70 recent lipids showed LDL at 123, follow diet and repeat labs through primary MD and consider getting medication if persistently elevated    Phone visit start time: 4:32 PM    Phone visit end time: 4: 53 PM    Location of the patient: At his home in [place]    Location of provider: [facility]    21 minutes spent on the date of the encounter doing chart review, review of recent labs, previous evaluation, history, documentation and addressed above-mentioned multiple issues  AVS with written instructions done      Follow-up in 6 months virtual or office visit    Copy to primary MD      This visit is being conducted as a virtual visit due to the emphasis on mitigation of the COVID-19 virus pandemic. The clinician has decided that the risk of an in-office visit outweighs the benefit for this patient.      Brandy Vaughn MD, FAYOSI, FSVM, FNLA, FACP  Vascular Medicine

## 2024-08-17 DIAGNOSIS — I82.411 ACUTE DEEP VEIN THROMBOSIS (DVT) OF FEMORAL VEIN OF RIGHT LOWER EXTREMITY (H): ICD-10-CM

## 2024-08-17 DIAGNOSIS — I26.09 ACUTE PULMONARY EMBOLISM WITH ACUTE COR PULMONALE, UNSPECIFIED PULMONARY EMBOLISM TYPE (H): ICD-10-CM

## 2024-08-19 RX ORDER — RIVAROXABAN 10 MG/1
10 TABLET, FILM COATED ORAL DAILY
Qty: 90 TABLET | Refills: 3 | Status: SHIPPED | OUTPATIENT
Start: 2024-08-19

## 2024-08-19 NOTE — TELEPHONE ENCOUNTER
Unable to fill per [facility] protocol   Med and pharmacy pended  Routing to ordering provider     Abel Vargas RN on 8/19/2024 at 3:51 PM

## 2024-10-02 ENCOUNTER — IMMUNIZATION (OUTPATIENT)
Dept: FAMILY MEDICINE | Facility: CLINIC | Age: 54
End: 2024-10-02
Payer: COMMERCIAL

## 2024-10-02 PROCEDURE — 90480 ADMN SARSCOV2 VAC 1/ONLY CMP: CPT

## 2024-10-02 PROCEDURE — 90673 RIV3 VACCINE NO PRESERV IM: CPT

## 2024-10-02 PROCEDURE — 91320 SARSCV2 VAC 30MCG TRS-SUC IM: CPT

## 2024-10-02 PROCEDURE — 90471 IMMUNIZATION ADMIN: CPT

## 2024-11-01 ENCOUNTER — OFFICE VISIT (OUTPATIENT)
Dept: OTHER | Facility: CLINIC | Age: 54
End: 2024-11-01
Attending: INTERNAL MEDICINE
Payer: COMMERCIAL

## 2024-11-01 VITALS
HEART RATE: 107 BPM | BODY MASS INDEX: 37.92 KG/M2 | OXYGEN SATURATION: 99 % | WEIGHT: 256 LBS | SYSTOLIC BLOOD PRESSURE: 136 MMHG | DIASTOLIC BLOOD PRESSURE: 92 MMHG

## 2024-11-01 DIAGNOSIS — I83.893 VARICOSE VEINS OF BILATERAL LOWER EXTREMITIES WITH OTHER COMPLICATIONS: ICD-10-CM

## 2024-11-01 DIAGNOSIS — Z95.828 S/P IVC FILTER: ICD-10-CM

## 2024-11-01 DIAGNOSIS — I26.09 ACUTE PULMONARY EMBOLISM WITH ACUTE COR PULMONALE, UNSPECIFIED PULMONARY EMBOLISM TYPE (H): ICD-10-CM

## 2024-11-01 DIAGNOSIS — I82.411 ACUTE DEEP VEIN THROMBOSIS (DVT) OF FEMORAL VEIN OF RIGHT LOWER EXTREMITY (H): ICD-10-CM

## 2024-11-01 DIAGNOSIS — G47.33 OSA (OBSTRUCTIVE SLEEP APNEA): ICD-10-CM

## 2024-11-01 DIAGNOSIS — I10 BENIGN ESSENTIAL HYPERTENSION: ICD-10-CM

## 2024-11-01 DIAGNOSIS — E78.5 HYPERLIPIDEMIA LDL GOAL <70: Primary | ICD-10-CM

## 2024-11-01 PROCEDURE — 99214 OFFICE O/P EST MOD 30 MIN: CPT | Performed by: INTERNAL MEDICINE

## 2024-11-01 PROCEDURE — G2211 COMPLEX E/M VISIT ADD ON: HCPCS | Performed by: INTERNAL MEDICINE

## 2024-11-01 PROCEDURE — 99213 OFFICE O/P EST LOW 20 MIN: CPT | Performed by: INTERNAL MEDICINE

## 2024-11-01 RX ORDER — ROSUVASTATIN CALCIUM 10 MG/1
10 TABLET, COATED ORAL DAILY
Qty: 90 TABLET | Refills: 1 | Status: SHIPPED | OUTPATIENT
Start: 2024-11-01

## 2024-11-01 NOTE — PROGRESS NOTES
SUBJECTIVE:  CC:   Follow-up visit  Review of recent labs and previous imaging studies  Shortness of breath resolved  Taking Xarelto 10 mg and tolerating   Known history of hyperlipidemia and did not improve much with lifestyle modifications and he has a strong family history of first-degree relatives with coronary artery disease and risk factors of overweight, hypertension, DVT/PE, obstructive sleep apnea etc.  He was initially seen on September 21, 2022 for Evaluation and management of first lifetime thromboembolic event extensive bilateral PE and right lower extremity DVT developed few weeks after COVID infection followed by 5-hour airplane trip to [place] underwent to bilateral pulmonary thrombectomy and IVC filter placement on September 10, 2022  HPI:  Joselito Padilla is a 54 year old very pleasant male with history of obstructive sleep apnea uses CPAP machine, hypertension well-controlled with medications, depression developed COVID infection in second week of August and recovered from it then followed by last week of August travel to [place] by airplane then developed worsening shortness of breath and leg swelling admitted to the hospital work-up revealed extensive bilateral PE with right heart strain and also acute occlusive DVT in the right distal femoral popliteal and below-knee veins.  Admission echocardiogram right heart strain with moderate dilatation of right ventricle and right ventricular dysfunction. he was initially admitted to the [facility] transferred to the [facility] underwent successful mechanical pulmonary thrombectomy and PA pressures improved.  He was initiated IV heparin in the hospital then switched to the Xarelto currently taking 15 mg twice a day for 21 days then followed by 20 mg daily for appropriate duration and currently maintenance dose of 10 mg daily and doing well  Clinically doing well no chest pain or shortness of breath   using  compression stockings  No  personal history of malignancy  No family history of first-degree relatives hypercoagulable status of DVT or PE  He is obese with KYLER waiting to get  CPAP  Reviewed recent echo significantly improved compared to initial 1 and normal LV function and RV function       HISTORIES:  PROBLEM LIST:   Patient Active Problem List   Diagnosis    Hypertension goal BP (blood pressure) < 140/90    Depression, major, recurrent (H)    CARDIOVASCULAR SCREENING; LDL GOAL LESS THAN 130    Sleep apnea    Francisco's deformity, right    Other postprocedural status(V45.89)    Morbid obesity (H)    Pulmonary emboli (H)    Acute pulmonary embolism with acute cor pulmonale, unspecified pulmonary embolism type (H)     PAST MEDICAL HISTORY:  Past Medical History:   Diagnosis Date    Achilles tendon pain 11/25/2013    Acute pulmonary embolism with acute cor pulmonale, unspecified pulmonary embolism type (H) 02/01/2023    ADHD     CARDIOVASCULAR SCREENING; LDL GOAL LESS THAN 130 11/12/2014    Depressive disorder     Hypertension goal BP (blood pressure) < 140/90 11/25/2013    Sleep apnea     Wears CPAP     PAST SURGICAL HISTORY:  Past Surgical History:   Procedure Laterality Date    COLONOSCOPY  2020    COLONOSCOPY N/A 10/11/2023    Procedure: Colonoscopy;  Surgeon: Татьяна Mercer MD;  Location:  GI    DENTAL SURGERY  1989    [place] teeth    heel surgery      bilateral    IR IVC FILTER PLACEMENT  09/10/2022    IR IVC FILTER REMOVAL  11/04/2022    IR PULMONARY ANGIOGRAM BILATERAL  09/10/2022    ORTHOPEDIC SURGERY  2015    Removal of Francisco's deformity    VASCULAR SURGERY  09/2022    VASECTOMY  2007     CURRENT MEDICATIONS:  Current Outpatient Medications   Medication Sig Dispense Refill    ARIPiprazole (ABILIFY) 2 MG tablet Take 2 mg by mouth daily Take in addition to 5 mg tablet for total daily dose of 7 mg  1    ARIPiprazole (ABILIFY) 5 MG tablet Take 5 mg by mouth daily Take in addition to 2 mg tablet for total daily dose of 7 mg       atomoxetine (STRATTERA) 80 MG capsule Take 80 mg by mouth daily      hydroCHLOROthiazide 12.5 MG tablet Take 1 tablet (12.5 mg) by mouth daily 90 tablet 3    lisinopril (ZESTRIL) 10 MG tablet Take 1 tablet (10 mg) by mouth daily 90 tablet 3    Multiple Vitamins-Minerals (MULTIVITAMIN ADULT PO)       PRISTIQ 100 MG TB24 24 hr tablet Take 100 mg by mouth daily  1    rosuvastatin (CRESTOR) 10 MG tablet Take 1 tablet (10 mg) by mouth daily. 90 tablet 1    XARELTO ANTICOAGULANT 10 MG TABS tablet TAKE 1 TABLET BY MOUTH DAILY 90 tablet 3     ALLERGIES:  No Known Allergies  SOCIAL HISTORY:  Social History     Socioeconomic History    Marital status:      Spouse name: wife - Soila    Number of children: 3    Years of education: Not on file    Highest education level: Not on file   Occupational History    Occupation:      Employer: [employer]   Tobacco Use    Smoking status: Never    Smokeless tobacco: Never   Vaping Use    Vaping status: Never Used   Substance and Sexual Activity    Alcohol use: No    Drug use: No    Sexual activity: Yes     Partners: Female     Birth control/protection: Male Surgical   Other Topics Concern     Service No    Blood Transfusions No    Caffeine Concern Not Asked    Occupational Exposure Not Asked    Hobby Hazards Not Asked    Sleep Concern Yes     Comment: Sleep apnea    Stress Concern Not Asked    Weight Concern Not Asked    Special Diet Not Asked    Back Care Not Asked    Exercise Yes     Comment: Walks    Bike Helmet Not Asked    Seat Belt Yes    Self-Exams Not Asked    Parent/sibling w/ CABG, MI or angioplasty before 65F 55M? Yes     Comment: Father had heart attack at the age of 54   Social History Narrative    He eats fruits and vegetables every day. Increased dairy recommended. Vitamin D supplement recommended.     Social Drivers of Health     Financial Resource Strain: Low Risk  (3/13/2024)    Financial Resource Strain     Within the past 12  months, have you or your family members you live with been unable to get utilities (heat, electricity) when it was really needed?: No   Food Insecurity: Low Risk  (3/13/2024)    Food Insecurity     Within the past 12 months, did you worry that your food would run out before you got money to buy more?: No     Within the past 12 months, did the food you bought just not last and you didn t have money to get more?: No   Transportation Needs: Low Risk  (3/13/2024)    Transportation Needs     Within the past 12 months, has lack of transportation kept you from medical appointments, getting your medicines, non-medical meetings or appointments, work, or from getting things that you need?: No   Physical Activity: Insufficiently Active (3/13/2024)    Exercise Vital Sign     Days of Exercise per Week: 3 days     Minutes of Exercise per Session: 40 min   Stress: No Stress Concern Present (3/13/2024)    [employer] [place] [employer] - Occupational Stress Questionnaire     Feeling of Stress : Not at all   Social Connections: Unknown (3/13/2024)    Social Connection and Isolation Panel [NHANES]     Frequency of Communication with Friends and Family: Not on file     Frequency of Social Gatherings with Friends and Family: Once a week     Attends [employer] Services: Not on file     Active Member of Clubs or Organizations: Not on file     Attends Club or Organization Meetings: Not on file     Marital Status: Not on file   Interpersonal Safety: Low Risk  (3/14/2024)    Interpersonal Safety     Do you feel physically and emotionally safe where you currently live?: Yes     Within the past 12 months, have you been hit, slapped, kicked or otherwise physically hurt by someone?: No     Within the past 12 months, have you been humiliated or emotionally abused in other ways by your partner or ex-partner?: No   Housing Stability: Low Risk  (3/13/2024)    Housing Stability     Do you have housing? : Yes     Are you worried about losing  your housing?: No     FAMILY HISTORY:  Family History   Problem Relation Age of Onset    Pulmonary Hypertension Mother     Hypertension Father     Heart Disease Father         bypass surgery    C.A.D. Father     Prostate Cancer Father      REVIEW OF SYSTEMS:  CONSTITUTIONAL:no malaise, fatigue, or other general symptoms  EYES: no subjective changes in visual acuity, no photophobia  ENT/MOUTH: no complaints of rhinorrhea, nasal congestion, sore throat, hearing changes  RESP:no SOB  CV: no c/o exertional chest pressure or MONDRAGON  GI: No abdominal pain, constipation, change in bowel movements, nausea, pyrosis, BRBPR  :no polyuria or polydipsia, no dysuria, no gross hematuria  MUSCULOSKELATAL:no arthalgias or myalgias  INTEGUMENTARY/SKIN: no pruritis, rashes, or moles with recent change in size, shape, or pigmentation  NEURO: no gross sensory or motor symptoms, no dizziness, no confusion  ENDOCRINE: no polyuria or polydipsia, no heat or cold intolerance  HEME/ALLERGY/IMMUNE: no fevers, chills, night sweats, or unwanted weight loss  PSYCHIATRIC: no depression, anxiety, or internal stimuli  EXAM:  BP (!) 136/92 (BP Location: Right arm, Patient Position: Chair, Cuff Size: Adult Regular)   Pulse 107   Wt 256 lb (116.1 kg)   SpO2 99%   BMI 37.92 kg/m    BMI: Body mass index is 37.92 kg/m .  GENERAL APPEARANCE:  Pleasant  Healthy appearing male , alert, active, no distress cooperative.  EXAM:  EYES: clear conjunctiva, no cataracts, no obvious fundoscopic abnormalities  HENT: oropharynx, nares, and TMs are WNL  NECK: no JVD, thyromegaly or lymphadenopathy, no cervical bruits  RESP: clear to auscultation without rales, wheezes, or rhonchi  CV: RRR, no murmurs, gallops, or rubs  LYMPH: no cervical , axillary, or inguinal lymphadenopathy appreciated  GI: NABS, ND/NT, no masses or organomegally appreciated  MS: no obvoius clinicallly relevant arthropathy, no evidence vasculitis  SKIN: no nevi clinically suspicious for  malignancy are noted  NEURO: CN II-XII intact, no localizing sensory or motor abnoramlities noted, DTRs symmetrical bilaterally  PSYCH: Mental status exam reveals the pt to have normal mood and affect. There is no disorder of thought form or content. There is no response to internal stimuli. There is no suicidal or homicidal ideation.    A/P:    Hyperlipidemia goal of LDL less than 70 and family history of first-degree relatives with coronary artery disease and CABG etc.  (I26.09)  HX Bilateral pulmonary embolism with acute cor pulmonale,  (H) 9/10/2022 s/p pulmonary thrombectomy First TE event provoked  (primary encounter diagnosis)  (I82.411) Hx of deep vein thrombosis (DVT) of femoral vein of right lower extremity (H)  (Z95.828) S/P IVC filter 9/10/2022, removed in 11/2022  (G47.33) KYLER (obstructive sleep apnea)  (U07.1) Infection due to 2019 novel coronavirus Aug 15th 2022 , recovered.  (I10) Benign essential hypertension  (I83.893) Varicose veins of bilateral lower extremities with other complications, CEAP 4 CVI    This is a very pleasant 54-year-old male developed first lifetime thromboembolic event appears provoked  with extensive bilateral PE right heart strain and also right lower extremity DVT underwent successful mechanical pulmonary thrombectomy and PA pressures improved and also underwent IVC filter placement then removal.  Currently on Xarelto tolerating without any problems  No personal history of malignancy no family history of first-degree relatives with DVT or PE  He is non-smoker  History of KYLER   Since this is a first lifetime event provoked no need for hypercoagulable studies almost completed 6 months of xarelto 20 mg      At present my recommendations,  Continue Xarelto 10 mg daily and take with food  Initiate Crestor 10 mg daily and take at bedtime and get fasting lipids, CMP and thyroid labs in January 2025 and notify the results  New prescription sent  Continue rest of medications  same  Lose weight  Use CPAP machine  Office visit in 1 year or sooner if any problems    30 minutes spent on the date of the encounter doing chart review, review of previous evaluation, recent labs, history, exam, documentation and addressed above-mentioned issues medication refilled lab orders placed AVS with written instructions given    The longitudinal care of plan for the above diagnoses was addressed during this visit. Due to added complexity of care, we will continue to supprt Joselito Padilla and the subsequent management of this/these conditions and with ongoing continuity of care for this/these conditions.     Copy of this note to primary care physician    Brandy Vaughn MD, RAMYA, [facility], FNLA  Vascular Medicine

## 2024-11-01 NOTE — PATIENT INSTRUCTIONS
Continue xarelto 10 mg daily with food     Start crestor 10 mg daily  and take at bed time to lower cholesterol     Fasting labs in Jan 2025 , order placed will notify results     Follow up in a year office visit    Continue rest of the medications same

## 2024-11-01 NOTE — PROGRESS NOTES
[facility]        Patient is here for a  follow up.    Pt is currently taking Xarelto.    BP (!) 136/92 (BP Location: Right arm, Patient Position: Chair, Cuff Size: Adult Regular)   Pulse 107   Wt 256 lb (116.1 kg)   SpO2 99%   BMI 37.92 kg/m      The provider has been notified that the patient has no concerns.     Questions patient would like addressed today are: N/A.    Refills are needed: N/A    Has homecare services and agency name:  Deloris Preciado MA

## 2024-11-01 NOTE — PROGRESS NOTES
[facility]        Patient is here for a follow up.    Pt is currently taking {Medications:841839}.    There were no vitals taken for this visit.    The provider has been notified that the patient {[facility] concerns:[id number]}.     Questions patient would like addressed today are: {Not Applicable or free text:[id number]}.    Refills are needed: No    Has homecare services and agency name:  {YES/NO:60}       Eve Bliss MA

## 2025-01-13 DIAGNOSIS — I10 ESSENTIAL HYPERTENSION: ICD-10-CM

## 2025-01-14 RX ORDER — LISINOPRIL 10 MG/1
10 TABLET ORAL DAILY
Qty: 90 TABLET | Refills: 0 | Status: SHIPPED | OUTPATIENT
Start: 2025-01-14

## 2025-01-14 RX ORDER — HYDROCHLOROTHIAZIDE 12.5 MG/1
12.5 TABLET ORAL DAILY
Qty: 90 TABLET | Refills: 0 | Status: SHIPPED | OUTPATIENT
Start: 2025-01-14

## 2025-02-14 SDOH — HEALTH STABILITY: PHYSICAL HEALTH: ON AVERAGE, HOW MANY MINUTES DO YOU ENGAGE IN EXERCISE AT THIS LEVEL?: 30 MIN

## 2025-02-14 SDOH — HEALTH STABILITY: PHYSICAL HEALTH: ON AVERAGE, HOW MANY DAYS PER WEEK DO YOU ENGAGE IN MODERATE TO STRENUOUS EXERCISE (LIKE A BRISK WALK)?: 4 DAYS

## 2025-02-14 ASSESSMENT — SOCIAL DETERMINANTS OF HEALTH (SDOH): HOW OFTEN DO YOU GET TOGETHER WITH FRIENDS OR RELATIVES?: ONCE A WEEK

## 2025-02-19 ENCOUNTER — OFFICE VISIT (OUTPATIENT)
Dept: FAMILY MEDICINE | Facility: CLINIC | Age: 55
End: 2025-02-19
Payer: COMMERCIAL

## 2025-02-19 VITALS
RESPIRATION RATE: 20 BRPM | SYSTOLIC BLOOD PRESSURE: 122 MMHG | HEIGHT: 69 IN | BODY MASS INDEX: 37.89 KG/M2 | DIASTOLIC BLOOD PRESSURE: 80 MMHG | HEART RATE: 80 BPM | TEMPERATURE: 97.2 F | OXYGEN SATURATION: 99 % | WEIGHT: 255.8 LBS

## 2025-02-19 DIAGNOSIS — E66.01 MORBID OBESITY (H): ICD-10-CM

## 2025-02-19 DIAGNOSIS — Z00.00 ROUTINE GENERAL MEDICAL EXAMINATION AT A HEALTH CARE FACILITY: ICD-10-CM

## 2025-02-19 DIAGNOSIS — I10 HYPERTENSION GOAL BP (BLOOD PRESSURE) < 140/90: Primary | ICD-10-CM

## 2025-02-19 DIAGNOSIS — I10 ESSENTIAL HYPERTENSION: ICD-10-CM

## 2025-02-19 DIAGNOSIS — Z12.5 SCREENING FOR PROSTATE CANCER: ICD-10-CM

## 2025-02-19 DIAGNOSIS — F33.40 RECURRENT MAJOR DEPRESSIVE DISORDER, IN REMISSION: ICD-10-CM

## 2025-02-19 DIAGNOSIS — E78.5 HYPERLIPIDEMIA LDL GOAL <70: ICD-10-CM

## 2025-02-19 LAB
ALBUMIN SERPL BCG-MCNC: 4.6 G/DL (ref 3.5–5.2)
ALP SERPL-CCNC: 77 U/L (ref 40–150)
ALT SERPL W P-5'-P-CCNC: 50 U/L (ref 0–70)
ANION GAP SERPL CALCULATED.3IONS-SCNC: 13 MMOL/L (ref 7–15)
AST SERPL W P-5'-P-CCNC: 42 U/L (ref 0–45)
BILIRUB SERPL-MCNC: 0.3 MG/DL
BUN SERPL-MCNC: 14.7 MG/DL (ref 6–20)
CALCIUM SERPL-MCNC: 10.1 MG/DL (ref 8.8–10.4)
CHLORIDE SERPL-SCNC: 103 MMOL/L (ref 98–107)
CHOLEST SERPL-MCNC: 132 MG/DL
CREAT SERPL-MCNC: 1.02 MG/DL (ref 0.67–1.17)
EGFRCR SERPLBLD CKD-EPI 2021: 87 ML/MIN/1.73M2
FASTING STATUS PATIENT QL REPORTED: YES
FASTING STATUS PATIENT QL REPORTED: YES
GLUCOSE SERPL-MCNC: 113 MG/DL (ref 70–99)
HCO3 SERPL-SCNC: 27 MMOL/L (ref 22–29)
HDLC SERPL-MCNC: 43 MG/DL
LDLC SERPL CALC-MCNC: 61 MG/DL
NONHDLC SERPL-MCNC: 89 MG/DL
POTASSIUM SERPL-SCNC: 4.3 MMOL/L (ref 3.4–5.3)
PROT SERPL-MCNC: 7.7 G/DL (ref 6.4–8.3)
PSA SERPL DL<=0.01 NG/ML-MCNC: 2.18 NG/ML (ref 0–3.5)
SODIUM SERPL-SCNC: 143 MMOL/L (ref 135–145)
TRIGL SERPL-MCNC: 141 MG/DL

## 2025-02-19 PROCEDURE — 99396 PREV VISIT EST AGE 40-64: CPT | Performed by: FAMILY MEDICINE

## 2025-02-19 PROCEDURE — 99213 OFFICE O/P EST LOW 20 MIN: CPT | Mod: 25 | Performed by: FAMILY MEDICINE

## 2025-02-19 PROCEDURE — 96127 BRIEF EMOTIONAL/BEHAV ASSMT: CPT | Performed by: FAMILY MEDICINE

## 2025-02-19 PROCEDURE — G0103 PSA SCREENING: HCPCS | Performed by: FAMILY MEDICINE

## 2025-02-19 PROCEDURE — 80053 COMPREHEN METABOLIC PANEL: CPT | Performed by: FAMILY MEDICINE

## 2025-02-19 PROCEDURE — 36415 COLL VENOUS BLD VENIPUNCTURE: CPT | Performed by: FAMILY MEDICINE

## 2025-02-19 PROCEDURE — 80061 LIPID PANEL: CPT | Performed by: FAMILY MEDICINE

## 2025-02-19 RX ORDER — HYDROCHLOROTHIAZIDE 12.5 MG/1
12.5 TABLET ORAL DAILY
Qty: 90 TABLET | Refills: 3 | Status: SHIPPED | OUTPATIENT
Start: 2025-02-19

## 2025-02-19 RX ORDER — LISINOPRIL 10 MG/1
10 TABLET ORAL DAILY
Qty: 90 TABLET | Refills: 3 | Status: SHIPPED | OUTPATIENT
Start: 2025-02-19

## 2025-02-19 ASSESSMENT — PAIN SCALES - GENERAL: PAINLEVEL_OUTOF10: NO PAIN (0)

## 2025-02-19 ASSESSMENT — PATIENT HEALTH QUESTIONNAIRE - PHQ9
SUM OF ALL RESPONSES TO PHQ QUESTIONS 1-9: 0
SUM OF ALL RESPONSES TO PHQ QUESTIONS 1-9: 0

## 2025-02-19 NOTE — PROGRESS NOTES
"Preventive Care Visit  [facility] MAI Maldonado MD, Family Medicine  Feb 19, 2025      Assessment & Plan     Routine general medical examination at a health care facility  Medication refill.  Once labs reviewed we will make any recommendation based on that.  - Lipid panel reflex to direct LDL Non-fasting; Future  - Comprehensive metabolic panel; Future  - Prostate Specific Antigen Screen; Future  - Lipid panel reflex to direct LDL Non-fasting  - Comprehensive metabolic panel  - Prostate Specific Antigen Screen    Hypertension goal BP (blood pressure) < 140/90    - Comprehensive metabolic panel; Future  - Comprehensive metabolic panel    Hyperlipidemia LDL goal <70    - Lipid panel reflex to direct LDL Non-fasting; Future  - Lipid panel reflex to direct LDL Non-fasting    Morbid obesity (H)  Advise working on diet and exercise he has been exercising with some improvement.  Advise low-carb diet.    Recurrent major depressive disorder, in remission  Stable Abilify and Pristiq    Screening for prostate cancer    - Prostate Specific Antigen Screen; Future  - Prostate Specific Antigen Screen    Essential hypertension    - hydroCHLOROthiazide 12.5 MG tablet; Take 1 tablet (12.5 mg) by mouth daily.  - lisinopril (ZESTRIL) 10 MG tablet; Take 1 tablet (10 mg) by mouth daily.    Patient has been advised of split billing requirements and indicates understanding: Yes    The longitudinal plan of care for the diagnosis(es)/condition(s) as documented were addressed during this visit. Due to the added complexity in care, I will continue to support Sea in the subsequent management and with ongoing continuity of care.        BMI  Estimated body mass index is 37.8 kg/m  as calculated from the following:    Height as of this encounter: 1.752 m (5' 8.98\").    Weight as of this encounter: 116 kg (255 lb 12.8 oz).       Counseling  Appropriate preventive services were addressed with this patient via screening, " questionnaire, or discussion as appropriate for fall prevention, nutrition, physical activity, Tobacco-use cessation, social engagement, weight loss and cognition.  Checklist reviewing preventive services available has been given to the patient.  Reviewed patient's diet, addressing concerns and/or questions.       MEDICATIONS:  Continue current medications without change    Subjective   Sea is a 54 year old, presenting for the following:  Physical (Is fasting )  Patient is a pleasant 54-year-old male underlying history of high blood pressure ,cholesterol, history of pulmonary embolism currently on blood thinners following up with vascular.  Underlying depression currently in remission on Pristiq and Abilify which has been helping.  He denies any chest pains no shortness of breath.      2/19/2025     8:11 AM   Additional Questions   Roomed by Christi          Hospitals in [place]    Health Care Directive  Patient has a Health Care Directive on file  Advance care planning document is on file and is current.      2/14/2025   General Health   How would you rate your overall physical health? Good   Feel stress (tense, anxious, or unable to sleep) Not at all         2/14/2025   Nutrition   Three or more servings of calcium each day? Yes   Diet: Regular (no restrictions)   How many servings of fruit and vegetables per day? (!) 2-3   How many sweetened beverages each day? 0-1         2/14/2025   Exercise   Days per week of moderate/strenous exercise 4 days   Average minutes spent exercising at this level 30 min         2/14/2025   Social Factors   Frequency of gathering with friends or relatives Once a week   Worry food won't last until get money to buy more No   Food not last or not have enough money for food? No   Do you have housing? (Housing is defined as stable permanent housing and does not include staying ouside in a car, in a tent, in an abandoned building, in an overnight shelter, or couch-surfing.) Yes   Are you worried about  losing your housing? No   Lack of transportation? No   Unable to get utilities (heat,electricity)? No         2/14/2025   Fall Risk   Fallen 2 or more times in the past year? No   Trouble with walking or balance? No          2/14/2025   Dental   Dentist two times every year? Yes         3/13/2024   TB Screening   Were you born outside of the [place]? No       Today's PHQ-9 Score:       2/19/2025     6:52 AM   PHQ-9 SCORE   PHQ-9 Total Score MyChart 0   PHQ-9 Total Score 0        Patient-reported         2/14/2025   Substance Use   Alcohol more than 3/day or more than 7/wk No   Do you use any other substances recreationally? No     Social History     Tobacco Use    Smoking status: Never     Passive exposure: Never    Smokeless tobacco: Never   Vaping Use    Vaping status: Never Used   Substance Use Topics    Alcohol use: No    Drug use: No         2/14/2025   STI Screening   New sexual partner(s) since last STI/HIV test? No   Last PSA:   PSA   Date Value Ref Range Status   12/17/2020 1.69 0 - 4 ug/L Final     Comment:     Assay Method:  Chemiluminescence using Siemens Vista analyzer     Prostate Specific Antigen Screen   Date Value Ref Range Status   03/14/2024 1.95 0.00 - 3.50 ng/mL Final   01/06/2022 1.91 0.00 - 4.00 ug/L Final     ASCVD Risk   The 10-year ASCVD risk score (Ayah RYAN, et al., 2019) is: 6.2%    Values used to calculate the score:      Age: 54 years      Sex: Male      Is Non- : No      Diabetic: No      Tobacco smoker: No      Systolic Blood Pressure: 122 mmHg      Is BP treated: Yes      HDL Cholesterol: 46 mg/dL      Total Cholesterol: 193 mg/dL      Reviewed and updated as needed this visit by Provider                    Past Medical History:   Diagnosis Date    Achilles tendon pain 11/25/2013    Acute pulmonary embolism with acute cor pulmonale, unspecified pulmonary embolism type (H) 02/01/2023    ADHD     CARDIOVASCULAR SCREENING; LDL GOAL LESS THAN 130 11/12/2014  "   Depressive disorder     Hypertension goal BP (blood pressure) < 140/90 11/25/2013    Sleep apnea     Wears CPAP     Past Surgical History:   Procedure Laterality Date    COLONOSCOPY  2020    COLONOSCOPY N/A 10/11/2023    Procedure: Colonoscopy;  Surgeon: Татьяна Mercer MD;  Location:  GI    DENTAL SURGERY  1989    [place] teeth    heel surgery      bilateral    IR IVC FILTER PLACEMENT  09/10/2022    IR IVC FILTER REMOVAL  11/04/2022    IR PULMONARY ANGIOGRAM BILATERAL  09/10/2022    ORTHOPEDIC SURGERY  2015    Removal of Francisco's deformity    VASCULAR SURGERY  09/2022    VASECTOMY  2007         Review of Systems  CONSTITUTIONAL: NEGATIVE for fever, chills, change in weight  INTEGUMENTARY/SKIN: NEGATIVE for worrisome rashes, moles or lesions  EYES: NEGATIVE for vision changes or irritation  ENT/MOUTH: NEGATIVE for ear, mouth and throat problems  RESP: NEGATIVE for significant cough or SOB  BREAST: NEGATIVE for masses, tenderness or discharge  CV: NEGATIVE for chest pain, palpitations or peripheral edema  GI: NEGATIVE for nausea, abdominal pain, heartburn, or change in bowel habits  : NEGATIVE for frequency, dysuria, or hematuria  MUSCULOSKELETAL: NEGATIVE for significant arthralgias or myalgia  NEURO: NEGATIVE for weakness, dizziness or paresthesias  ENDOCRINE: NEGATIVE for temperature intolerance, skin/hair changes  HEME: NEGATIVE for bleeding problems  PSYCHIATRIC: NEGATIVE for changes in mood or affect     Objective    Exam  /80 (BP Location: Left arm, Patient Position: Sitting, Cuff Size: Adult Large)   Pulse 80   Temp 97.2  F (36.2  C) (Temporal)   Resp 20   Ht 1.752 m (5' 8.98\")   Wt 116 kg (255 lb 12.8 oz)   SpO2 99%   BMI 37.80 kg/m     Estimated body mass index is 37.8 kg/m  as calculated from the following:    Height as of this encounter: 1.752 m (5' 8.98\").    Weight as of this encounter: 116 kg (255 lb 12.8 oz).    Physical Exam  GENERAL: alert and no distress  EYES: Eyes " grossly normal to inspection, PERRL and conjunctivae and sclerae normal  HENT: ear canals and TM's normal, nose and mouth without ulcers or lesions  NECK: no adenopathy, no asymmetry, masses, or scars  RESP: lungs clear to auscultation - no rales, rhonchi or wheezes  CV: regular rate and rhythm, normal S1 S2, no S3 or S4, no murmur, click or rub, no peripheral edema  ABDOMEN: soft, nontender, no hepatosplenomegaly, no masses and bowel sounds normal  MS: no gross musculoskeletal defects noted, no edema  SKIN: no suspicious lesions or rashes  NEURO: Normal strength and tone, mentation intact and speech normal  PSYCH: mentation appears normal, affect normal/bright        Signed Electronically by: Bhaskar Maldonado MD    Answers submitted by the patient for this visit:  Patient Health Questionnaire (Submitted on 2/19/2025)  PHQ9 TOTAL SCORE: 0

## 2025-04-05 DIAGNOSIS — E78.5 HYPERLIPIDEMIA LDL GOAL <70: ICD-10-CM

## 2025-04-07 RX ORDER — ROSUVASTATIN CALCIUM 10 MG/1
10 TABLET, COATED ORAL DAILY
Qty: 90 TABLET | Refills: 1 | Status: SHIPPED | OUTPATIENT
Start: 2025-04-07

## 2025-04-07 NOTE — TELEPHONE ENCOUNTER
Last Written Prescription Date:  11/1/24  Last Fill Quantity: 90,  # refills: 1   Last office visit: 11/1/2024 ; last virtual visit: 5/1/2024 with prescribing provider:     Future Office Visit:      Requested Prescriptions   Pending Prescriptions Disp Refills    rosuvastatin (CRESTOR) 10 MG tablet [Pharmacy Med Name: Rosuvastatin Calcium 10 MG Oral Tablet] 90 tablet 3     Sig: TAKE 1 TABLET BY MOUTH DAILY       Antihyperlipidemic agents Passed - 4/7/2025  8:11 AM        Passed - LDL on file in the past 12 months        Passed - Medication is active on med list and the sig matches. RN to manually verify dose and sig if red X/fail.     If the protocol passes (green check), you do not need to verify med dose and sig.    A prescription matches if they are the same clinical intention.    For Example: once daily and every morning are the same.    The protocol can not identify upper and lower case letters as matching and will fail.     For Example: Take 1 tablet (50 mg) by mouth daily     TAKE 1 TABLET (50 MG) BY MOUTH DAILY    For all fails (red x), verify dose and sig.    If the refill does match what is on file, the RN can still proceed to approve the refill request.       If they do not match, route to the appropriate provider.             Passed - Recent (12 mo) or future (90 days) visit within the authorizing provider's specialty     The patient must have completed an in-person or virtual visit within the past 12 months or has a future visit scheduled within the next 90 days with the authorizing provider s specialty.  Urgent care and e-visits do not qualify as an office visit for this protocol.          Passed - Patient is age 18 years or older           Prescription approved per [facility] Refill Protocol.    Alejandra ZAPATA RN    [facility]  Office: [phone]  Fax: [phone]

## 2025-04-11 ENCOUNTER — TRANSFERRED RECORDS (OUTPATIENT)
Dept: HEALTH INFORMATION MANAGEMENT | Facility: CLINIC | Age: 55
End: 2025-04-11
Payer: COMMERCIAL

## 2025-08-16 DIAGNOSIS — I82.411 ACUTE DEEP VEIN THROMBOSIS (DVT) OF FEMORAL VEIN OF RIGHT LOWER EXTREMITY (H): ICD-10-CM

## 2025-08-16 DIAGNOSIS — I26.09 ACUTE PULMONARY EMBOLISM WITH ACUTE COR PULMONALE, UNSPECIFIED PULMONARY EMBOLISM TYPE (H): ICD-10-CM

## 2025-08-18 RX ORDER — RIVAROXABAN 10 MG/1
10 TABLET, FILM COATED ORAL DAILY
Qty: 90 TABLET | Refills: 0 | Status: SHIPPED | OUTPATIENT
Start: 2025-08-18

## (undated) RX ORDER — HEPARIN SODIUM 200 [USP'U]/100ML
INJECTION, SOLUTION INTRAVENOUS
Status: DISPENSED
Start: 2022-09-10

## (undated) RX ORDER — FENTANYL CITRATE 50 UG/ML
INJECTION, SOLUTION INTRAMUSCULAR; INTRAVENOUS
Status: DISPENSED
Start: 2022-09-10

## (undated) RX ORDER — FENTANYL CITRATE 50 UG/ML
INJECTION, SOLUTION INTRAMUSCULAR; INTRAVENOUS
Status: DISPENSED
Start: 2022-11-04

## (undated) RX ORDER — HEPARIN SODIUM 200 [USP'U]/100ML
INJECTION, SOLUTION INTRAVENOUS
Status: DISPENSED
Start: 2022-11-04

## (undated) RX ORDER — FENTANYL CITRATE 50 UG/ML
INJECTION, SOLUTION INTRAMUSCULAR; INTRAVENOUS
Status: DISPENSED
Start: 2020-07-13

## (undated) RX ORDER — HEPARIN SODIUM 1000 [USP'U]/ML
INJECTION, SOLUTION INTRAVENOUS; SUBCUTANEOUS
Status: DISPENSED
Start: 2022-09-10